# Patient Record
Sex: MALE | Race: BLACK OR AFRICAN AMERICAN | NOT HISPANIC OR LATINO | URBAN - METROPOLITAN AREA
[De-identification: names, ages, dates, MRNs, and addresses within clinical notes are randomized per-mention and may not be internally consistent; named-entity substitution may affect disease eponyms.]

---

## 2014-04-17 RX ORDER — LEVOTHYROXINE SODIUM 125 MCG
1 TABLET ORAL
Qty: 0 | Refills: 0 | COMMUNITY
Start: 2014-04-17

## 2014-04-17 RX ORDER — ATORVASTATIN CALCIUM 80 MG/1
1 TABLET, FILM COATED ORAL
Qty: 0 | Refills: 0 | COMMUNITY
Start: 2014-04-17

## 2014-04-17 RX ORDER — AMLODIPINE BESYLATE 2.5 MG/1
1 TABLET ORAL
Qty: 0 | Refills: 0 | COMMUNITY
Start: 2014-04-17

## 2017-04-14 ENCOUNTER — INPATIENT (INPATIENT)
Facility: HOSPITAL | Age: 82
LOS: 0 days | Discharge: HOME | End: 2017-04-15
Attending: INTERNAL MEDICINE | Admitting: INTERNAL MEDICINE
Payer: MEDICARE

## 2017-04-14 VITALS
OXYGEN SATURATION: 100 % | TEMPERATURE: 99 F | RESPIRATION RATE: 16 BRPM | SYSTOLIC BLOOD PRESSURE: 152 MMHG | HEART RATE: 84 BPM | DIASTOLIC BLOOD PRESSURE: 69 MMHG

## 2017-04-14 DIAGNOSIS — R07.9 CHEST PAIN, UNSPECIFIED: ICD-10-CM

## 2017-04-14 LAB
ALBUMIN SERPL ELPH-MCNC: 4 G/DL — SIGNIFICANT CHANGE UP (ref 3.3–5)
ALP SERPL-CCNC: 50 U/L — SIGNIFICANT CHANGE UP (ref 40–120)
ALT FLD-CCNC: 12 U/L — SIGNIFICANT CHANGE UP (ref 4–41)
APTT BLD: 29.2 SEC — SIGNIFICANT CHANGE UP (ref 27.5–37.4)
AST SERPL-CCNC: 19 U/L — SIGNIFICANT CHANGE UP (ref 4–40)
BASE EXCESS BLDV CALC-SCNC: 4.6 MMOL/L — SIGNIFICANT CHANGE UP
BASOPHILS # BLD AUTO: 0.01 K/UL — SIGNIFICANT CHANGE UP (ref 0–0.2)
BASOPHILS NFR BLD AUTO: 0.1 % — SIGNIFICANT CHANGE UP (ref 0–2)
BILIRUB SERPL-MCNC: 0.2 MG/DL — SIGNIFICANT CHANGE UP (ref 0.2–1.2)
BLOOD GAS VENOUS - CREATININE: 0.92 MG/DL — SIGNIFICANT CHANGE UP (ref 0.5–1.3)
BUN SERPL-MCNC: 21 MG/DL — SIGNIFICANT CHANGE UP (ref 7–23)
CALCIUM SERPL-MCNC: 8.5 MG/DL — SIGNIFICANT CHANGE UP (ref 8.4–10.5)
CHLORIDE BLDV-SCNC: 107 MMOL/L — SIGNIFICANT CHANGE UP (ref 96–108)
CHLORIDE SERPL-SCNC: 103 MMOL/L — SIGNIFICANT CHANGE UP (ref 98–107)
CK MB BLD-MCNC: 4.41 NG/ML — SIGNIFICANT CHANGE UP (ref 1–6.6)
CK SERPL-CCNC: 184 U/L — SIGNIFICANT CHANGE UP (ref 30–200)
CO2 SERPL-SCNC: 29 MMOL/L — SIGNIFICANT CHANGE UP (ref 22–31)
CREAT SERPL-MCNC: 0.91 MG/DL — SIGNIFICANT CHANGE UP (ref 0.5–1.3)
EOSINOPHIL # BLD AUTO: 0.05 K/UL — SIGNIFICANT CHANGE UP (ref 0–0.5)
EOSINOPHIL NFR BLD AUTO: 0.6 % — SIGNIFICANT CHANGE UP (ref 0–6)
GAS PNL BLDV: 141 MMOL/L — SIGNIFICANT CHANGE UP (ref 136–146)
GLUCOSE BLDV-MCNC: 120 — HIGH (ref 70–99)
GLUCOSE SERPL-MCNC: 119 MG/DL — HIGH (ref 70–99)
HCO3 BLDV-SCNC: 27 MMOL/L — SIGNIFICANT CHANGE UP (ref 20–27)
HCT VFR BLD CALC: 38.4 % — LOW (ref 39–50)
HCT VFR BLDV CALC: 37.3 % — LOW (ref 39–51)
HGB BLD-MCNC: 12 G/DL — LOW (ref 13–17)
HGB BLDV-MCNC: 12.1 G/DL — LOW (ref 13–17)
IMM GRANULOCYTES NFR BLD AUTO: 0.1 % — SIGNIFICANT CHANGE UP (ref 0–1.5)
INR BLD: 1.05 — SIGNIFICANT CHANGE UP (ref 0.88–1.17)
LACTATE BLDV-MCNC: 1.9 MMOL/L — SIGNIFICANT CHANGE UP (ref 0.5–2)
LYMPHOCYTES # BLD AUTO: 1.1 K/UL — SIGNIFICANT CHANGE UP (ref 1–3.3)
LYMPHOCYTES # BLD AUTO: 13.8 % — SIGNIFICANT CHANGE UP (ref 13–44)
MCHC RBC-ENTMCNC: 29.7 PG — SIGNIFICANT CHANGE UP (ref 27–34)
MCHC RBC-ENTMCNC: 31.3 % — LOW (ref 32–36)
MCV RBC AUTO: 95 FL — SIGNIFICANT CHANGE UP (ref 80–100)
MONOCYTES # BLD AUTO: 0.42 K/UL — SIGNIFICANT CHANGE UP (ref 0–0.9)
MONOCYTES NFR BLD AUTO: 5.3 % — SIGNIFICANT CHANGE UP (ref 2–14)
NEUTROPHILS # BLD AUTO: 6.39 K/UL — SIGNIFICANT CHANGE UP (ref 1.8–7.4)
NEUTROPHILS NFR BLD AUTO: 80.1 % — HIGH (ref 43–77)
PCO2 BLDV: 52 MMHG — HIGH (ref 41–51)
PH BLDV: 7.37 PH — SIGNIFICANT CHANGE UP (ref 7.32–7.43)
PLATELET # BLD AUTO: 174 K/UL — SIGNIFICANT CHANGE UP (ref 150–400)
PMV BLD: 10.4 FL — SIGNIFICANT CHANGE UP (ref 7–13)
PO2 BLDV: 38 MMHG — SIGNIFICANT CHANGE UP (ref 35–40)
POTASSIUM BLDV-SCNC: 3.9 MMOL/L — SIGNIFICANT CHANGE UP (ref 3.4–4.5)
POTASSIUM SERPL-MCNC: 4.1 MMOL/L — SIGNIFICANT CHANGE UP (ref 3.5–5.3)
POTASSIUM SERPL-SCNC: 4.1 MMOL/L — SIGNIFICANT CHANGE UP (ref 3.5–5.3)
PROT SERPL-MCNC: 6.7 G/DL — SIGNIFICANT CHANGE UP (ref 6–8.3)
PROTHROM AB SERPL-ACNC: 11.8 SEC — SIGNIFICANT CHANGE UP (ref 9.8–13.1)
RBC # BLD: 4.04 M/UL — LOW (ref 4.2–5.8)
RBC # FLD: 14.3 % — SIGNIFICANT CHANGE UP (ref 10.3–14.5)
SAO2 % BLDV: 64 % — SIGNIFICANT CHANGE UP (ref 60–85)
SODIUM SERPL-SCNC: 144 MMOL/L — SIGNIFICANT CHANGE UP (ref 135–145)
TROPONIN T SERPL-MCNC: < 0.06 NG/ML — SIGNIFICANT CHANGE UP (ref 0–0.06)
WBC # BLD: 7.98 K/UL — SIGNIFICANT CHANGE UP (ref 3.8–10.5)
WBC # FLD AUTO: 7.98 K/UL — SIGNIFICANT CHANGE UP (ref 3.8–10.5)

## 2017-04-14 PROCEDURE — 71020: CPT | Mod: 26

## 2017-04-14 NOTE — ED ADULT TRIAGE NOTE - CHIEF COMPLAINT QUOTE
Pt with hx of dementia brought in by wife with c/o pain radiating down LLE with numbness/tingling. Pt also c/o transient abd discomfort. Pt cannot recall any other adverse medical symptoms at this time. Abnormal EKG - advised to bring pt back soon.

## 2017-04-14 NOTE — ED PROVIDER NOTE - OBJECTIVE STATEMENT
82 yo M with history of alzheimers dementia and htn presenting with chest pain with radiation to left arm today at 7pm that lasted for 30 minutes that resolved upon arrival. Pt is poor historian. Denies fevers, chills, cough, rhinorrhea, otorrhea, otalgia, nausea, vomiting, constipation, diarrhea, chest pain, shortness of breath or changes in urinary habits.

## 2017-04-14 NOTE — ED PROVIDER NOTE - ATTENDING CONTRIBUTION TO CARE
I performed a face to face evaluation of this patient and performed a full history and physical examination on the patient.  I agree with the resident's history, physical examination, and plan of the patient.  84yo M PMH: HTN, prostate CA s/p prostectomy, thyroid CA s/p thyroidectomy brought to ED by family for chest pain radiating to LUE. Patient complained of pain earlier today, no prior similar complaints of pain, no recent URI, cough, fever, injury. Currently patient has no complaints  On exam awake & alert, NAD., lungs CTAB, RRR, abdomen soft NT/ND, no CVA tenderness, no edema, no calf tenderness, 2+ pulses b/l, skin warm and dry no rash

## 2017-04-14 NOTE — ED PROVIDER NOTE - MEDICAL DECISION MAKING DETAILS
84 yo M with chest pain - rule out ACS with LBBB changed from old in 2014 - will get enzymes and admit

## 2017-04-14 NOTE — ED ADULT NURSE NOTE - OBJECTIVE STATEMENT
A&Ox2, respirations even and unlabored, skin warm and dry good for color and intact, speaks coherently, moves extremities. Patient states he "feels" A&Ox2, respirations even and unlabored, skin warm and dry good for color and intact, speaks coherently, moves extremities. Patient states he "feels fine". As per family during dinner he held his chest and complained of chest pain. Patient denies n/v, LOC, recent falls.

## 2017-04-15 VITALS
TEMPERATURE: 98 F | SYSTOLIC BLOOD PRESSURE: 135 MMHG | OXYGEN SATURATION: 100 % | DIASTOLIC BLOOD PRESSURE: 48 MMHG | RESPIRATION RATE: 17 BRPM | HEART RATE: 64 BPM

## 2017-04-15 DIAGNOSIS — I24.9 ACUTE ISCHEMIC HEART DISEASE, UNSPECIFIED: ICD-10-CM

## 2017-04-15 DIAGNOSIS — Z41.8 ENCOUNTER FOR OTHER PROCEDURES FOR PURPOSES OTHER THAN REMEDYING HEALTH STATE: ICD-10-CM

## 2017-04-15 DIAGNOSIS — I10 ESSENTIAL (PRIMARY) HYPERTENSION: ICD-10-CM

## 2017-04-15 DIAGNOSIS — F03.90 UNSPECIFIED DEMENTIA, UNSPECIFIED SEVERITY, WITHOUT BEHAVIORAL DISTURBANCE, PSYCHOTIC DISTURBANCE, MOOD DISTURBANCE, AND ANXIETY: ICD-10-CM

## 2017-04-15 DIAGNOSIS — E78.5 HYPERLIPIDEMIA, UNSPECIFIED: ICD-10-CM

## 2017-04-15 LAB
BUN SERPL-MCNC: 23 MG/DL — SIGNIFICANT CHANGE UP (ref 7–23)
CALCIUM SERPL-MCNC: 8.3 MG/DL — LOW (ref 8.4–10.5)
CHLORIDE SERPL-SCNC: 105 MMOL/L — SIGNIFICANT CHANGE UP (ref 98–107)
CHOLEST SERPL-MCNC: 174 MG/DL — SIGNIFICANT CHANGE UP (ref 120–199)
CK MB BLD-MCNC: 3.22 NG/ML — SIGNIFICANT CHANGE UP (ref 1–6.6)
CK SERPL-CCNC: 145 U/L — SIGNIFICANT CHANGE UP (ref 30–200)
CO2 SERPL-SCNC: 29 MMOL/L — SIGNIFICANT CHANGE UP (ref 22–31)
CREAT SERPL-MCNC: 0.82 MG/DL — SIGNIFICANT CHANGE UP (ref 0.5–1.3)
GLUCOSE SERPL-MCNC: 107 MG/DL — HIGH (ref 70–99)
HBA1C BLD-MCNC: 5.6 % — SIGNIFICANT CHANGE UP (ref 4–5.6)
HCT VFR BLD CALC: 35.7 % — LOW (ref 39–50)
HDLC SERPL-MCNC: 61 MG/DL — HIGH (ref 35–55)
HGB BLD-MCNC: 11.4 G/DL — LOW (ref 13–17)
LIPID PNL WITH DIRECT LDL SERPL: 106 MG/DL — SIGNIFICANT CHANGE UP
MAGNESIUM SERPL-MCNC: 2.2 MG/DL — SIGNIFICANT CHANGE UP (ref 1.6–2.6)
MCHC RBC-ENTMCNC: 30.2 PG — SIGNIFICANT CHANGE UP (ref 27–34)
MCHC RBC-ENTMCNC: 31.9 % — LOW (ref 32–36)
MCV RBC AUTO: 94.7 FL — SIGNIFICANT CHANGE UP (ref 80–100)
PHOSPHATE SERPL-MCNC: 4.3 MG/DL — SIGNIFICANT CHANGE UP (ref 2.5–4.5)
PLATELET # BLD AUTO: 169 K/UL — SIGNIFICANT CHANGE UP (ref 150–400)
PMV BLD: 10.3 FL — SIGNIFICANT CHANGE UP (ref 7–13)
POTASSIUM SERPL-MCNC: 4 MMOL/L — SIGNIFICANT CHANGE UP (ref 3.5–5.3)
POTASSIUM SERPL-SCNC: 4 MMOL/L — SIGNIFICANT CHANGE UP (ref 3.5–5.3)
RBC # BLD: 3.77 M/UL — LOW (ref 4.2–5.8)
RBC # FLD: 14.4 % — SIGNIFICANT CHANGE UP (ref 10.3–14.5)
SODIUM SERPL-SCNC: 143 MMOL/L — SIGNIFICANT CHANGE UP (ref 135–145)
TRIGL SERPL-MCNC: 79 MG/DL — SIGNIFICANT CHANGE UP (ref 10–149)
TROPONIN T SERPL-MCNC: < 0.06 NG/ML — SIGNIFICANT CHANGE UP (ref 0–0.06)
TSH SERPL-MCNC: 0.53 UIU/ML — SIGNIFICANT CHANGE UP (ref 0.27–4.2)
WBC # BLD: 4.84 K/UL — SIGNIFICANT CHANGE UP (ref 3.8–10.5)
WBC # FLD AUTO: 4.84 K/UL — SIGNIFICANT CHANGE UP (ref 3.8–10.5)

## 2017-04-15 PROCEDURE — 93306 TTE W/DOPPLER COMPLETE: CPT | Mod: 26

## 2017-04-15 RX ORDER — MEMANTINE HYDROCHLORIDE 10 MG/1
10 TABLET ORAL
Qty: 0 | Refills: 0 | Status: DISCONTINUED | OUTPATIENT
Start: 2017-04-15 | End: 2017-04-15

## 2017-04-15 RX ORDER — CHOLECALCIFEROL (VITAMIN D3) 125 MCG
2000 CAPSULE ORAL DAILY
Qty: 0 | Refills: 0 | Status: DISCONTINUED | OUTPATIENT
Start: 2017-04-15 | End: 2017-04-15

## 2017-04-15 RX ORDER — ATORVASTATIN CALCIUM 80 MG/1
10 TABLET, FILM COATED ORAL AT BEDTIME
Qty: 0 | Refills: 0 | Status: DISCONTINUED | OUTPATIENT
Start: 2017-04-15 | End: 2017-04-15

## 2017-04-15 RX ORDER — LEVOTHYROXINE SODIUM 125 MCG
175 TABLET ORAL DAILY
Qty: 0 | Refills: 0 | Status: DISCONTINUED | OUTPATIENT
Start: 2017-04-15 | End: 2017-04-15

## 2017-04-15 RX ORDER — AMLODIPINE BESYLATE 2.5 MG/1
10 TABLET ORAL DAILY
Qty: 0 | Refills: 0 | Status: DISCONTINUED | OUTPATIENT
Start: 2017-04-15 | End: 2017-04-15

## 2017-04-15 RX ORDER — HEPARIN SODIUM 5000 [USP'U]/ML
5000 INJECTION INTRAVENOUS; SUBCUTANEOUS EVERY 8 HOURS
Qty: 0 | Refills: 0 | Status: DISCONTINUED | OUTPATIENT
Start: 2017-04-15 | End: 2017-04-15

## 2017-04-15 RX ORDER — ASPIRIN/CALCIUM CARB/MAGNESIUM 324 MG
81 TABLET ORAL DAILY
Qty: 0 | Refills: 0 | Status: DISCONTINUED | OUTPATIENT
Start: 2017-04-15 | End: 2017-04-15

## 2017-04-15 RX ADMIN — AMLODIPINE BESYLATE 10 MILLIGRAM(S): 2.5 TABLET ORAL at 06:21

## 2017-04-15 RX ADMIN — Medication 175 MICROGRAM(S): at 06:21

## 2017-04-15 RX ADMIN — HEPARIN SODIUM 5000 UNIT(S): 5000 INJECTION INTRAVENOUS; SUBCUTANEOUS at 06:21

## 2017-04-15 RX ADMIN — HEPARIN SODIUM 5000 UNIT(S): 5000 INJECTION INTRAVENOUS; SUBCUTANEOUS at 14:06

## 2017-04-15 RX ADMIN — Medication 81 MILLIGRAM(S): at 11:30

## 2017-04-15 RX ADMIN — MEMANTINE HYDROCHLORIDE 10 MILLIGRAM(S): 10 TABLET ORAL at 06:21

## 2017-04-15 RX ADMIN — Medication 2000 UNIT(S): at 11:30

## 2017-04-15 RX ADMIN — Medication 1 TABLET(S): at 11:30

## 2017-04-15 NOTE — DISCHARGE NOTE ADULT - PLAN OF CARE
Prevent future episodes. Ensure compliance with medications. Follow up with cardiologist within one week of discharge. Call for appointment. Return to ED for any concerning symptoms. Continue medications as prescribed. Low salt, low fat, low cholesterol diet. Maintain adequate control of your blood pressure. Goal BP < 130/80. Continue low sodium diet. Follow up with PCP and/or cardiologist for ongoing medical management of your hypertension. Continue medications as prescribed. Low salt diet. Maintain adequate control of your cholesterol levels. Goal LDL < 70. Follow up with PCP for ongoing medical management. Continue medications as prescribed. Low cholesterol diet. Continue current medications. Follow up with your PCP and/or neurologist.

## 2017-04-15 NOTE — DISCHARGE NOTE ADULT - PROVIDER TOKENS
TOKEN:'[id number]',FREE:[LAST:[Bernardino],FIRST:[Merari],PHONE:[phone]],FAX:[(   [phone]    -],ADDRESS:[[facility]]]

## 2017-04-15 NOTE — H&P ADULT. - RS GEN PE MLT RESP DETAILS PC
clear to auscultation bilaterally/good air movement/airway patent/respirations non-labored/no wheezes/no rales/no chest wall tenderness/no intercostal retractions/normal/airway obstructed/breath sounds equal/no rhonchi

## 2017-04-15 NOTE — H&P ADULT. - GASTROINTESTINAL DETAILS
no guarding/bowel sounds normal/normal/no rigidity/no distention/soft/nontender/no rebound tenderness

## 2017-04-15 NOTE — H&P ADULT. - ASSESSMENT
82 y/o M with h/o alzheimers dementia, HTN, HLD, prostate CA s/p prostatectomy, thyroid cA s/p thyroidectomy present to the ED for chest pain. Admit to telemetry to r/o ACS

## 2017-04-15 NOTE — H&P ADULT. - HISTORY OF PRESENT ILLNESS
84 y/o M with h/o alzheimer's dementia, HTN, HLD, prostate CA s/p prostatectomy, thyroid CA s/p thyroidectomy presents to the ED for chest pain. Pt has a history of alzheimer's disease and patient is poor historian. Pt's history was obtained from family. As per son, patient was eating dinner and put his hand on his chest and complained of chest pain. Pt also complained of right arm pain. Pt is currently asymptomatic and denies any chest pain. Pt denies LOC, syncope, fever, chills, recent infection, N/VD/C, shortness of breath, palpitations, numbness, tingling, dysuria, urinary/bowel incontinence or any other complaints at this time.

## 2017-04-15 NOTE — DISCHARGE NOTE ADULT - MEDICATION SUMMARY - MEDICATIONS TO TAKE
I will START or STAY ON the medications listed below when I get home from the hospital:    aspirin 81 mg oral tablet  -- 1 tab(s) by mouth once a day  -- Indication: For HLD (hyperlipidemia)    atorvastatin 10 mg oral tablet  -- 1 tab(s) by mouth once a day  -- Indication: For HLD (hyperlipidemia)    amLODIPine 10 mg oral tablet  -- 1 tab(s) by mouth once a day  -- Indication: For HTN (hypertension)    memantine 10 mg oral tablet  -- 1 tab(s) by mouth 2 times a day  -- Indication: For Dementia    levothyroxine 175 mcg (0.175 mg) oral tablet  -- 1 tab(s) by mouth once a day  -- Indication: For Hypothyroidism    calcium (as carbonate)-vitamin D 250 mg-125 intl units oral tablet  -- 1 tab(s) by mouth once a day  -- Indication: For Need for prophylactic measure    Vitamin D3 2000 intl units oral capsule  -- 1 cap(s) by mouth once a day  -- Indication: For Need for prophylactic measure

## 2017-04-15 NOTE — DISCHARGE NOTE ADULT - PATIENT PORTAL LINK FT
“You can access the [facility] Patient Portal, offered by [facility], by registering with the following website: [email or web address]”

## 2017-04-15 NOTE — DISCHARGE NOTE ADULT - NS AS ACTIVITY OBS
Showering allowed/Walking-Indoors allowed/No Heavy lifting/straining/Walking-Outdoors allowed/Do not make important decisions/Do not drive or operate machinery/Bathing allowed

## 2017-04-15 NOTE — DISCHARGE NOTE ADULT - HOSPITAL COURSE
84 y/o male with a PMHx of Alzheimer's dementia, HTN, HLD, prostate cancer S/P prostatectomy, thyroid cancer S/P thyroidectomy presents to ED with chest pain. Pt was admitted to telemetry. EKG revealed NSR at 71 bpm with LBBB. Pt ruled out for ACS with two sets of negative cardiac enzymes. CXR revealed clear lungs. Pt was seen by Dr. Koch from cardiology. Pt had no events on telemetry during admission. Chest pain was deemed atypical. Pt now stable for discharge home with outpatient cardiology follow up.

## 2017-04-15 NOTE — H&P ADULT. - PROBLEM SELECTOR PLAN 1
Admit to telemetry.   Trend CE. EKG, CXR.  consider ischemic evaluation. continue to monitor.   Check tsh, lipid, hemoglobin a1c, cbc, bmp with mag and phos.   f/u MD note

## 2017-04-15 NOTE — DISCHARGE NOTE ADULT - CARE PROVIDER_API CALL
Cosme Koch (MAYELIN), Cardiology  [address] [address] [place] [id number]  Phone: [phone]  Fax: [phone]    Merari Lebron  [facility]  Phone: [phone]  Fax: (   )    -

## 2017-04-15 NOTE — DISCHARGE NOTE ADULT - ADDITIONAL INSTRUCTIONS
Follow up with cardiologist within one week of discharge. Call for appointment. Return to ED for any concerning symptoms. Continue medications as prescribed. Low salt, low fat, low cholesterol diet.

## 2017-04-15 NOTE — DISCHARGE NOTE ADULT - CARE PLAN
Principal Discharge DX:	Atypical chest pain  Goal:	Prevent future episodes. Ensure compliance with medications.  Instructions for follow-up, activity and diet:	Follow up with cardiologist within one week of discharge. Call for appointment. Return to ED for any concerning symptoms. Continue medications as prescribed. Low salt, low fat, low cholesterol diet.  Secondary Diagnosis:	HTN (hypertension)  Goal:	Maintain adequate control of your blood pressure. Goal BP < 130/80. Continue low sodium diet.  Instructions for follow-up, activity and diet:	Follow up with PCP and/or cardiologist for ongoing medical management of your hypertension. Continue medications as prescribed. Low salt diet.  Secondary Diagnosis:	HLD (hyperlipidemia)  Goal:	Maintain adequate control of your cholesterol levels. Goal LDL < 70.  Instructions for follow-up, activity and diet:	Follow up with PCP for ongoing medical management. Continue medications as prescribed. Low cholesterol diet.  Secondary Diagnosis:	Dementia  Goal:	Continue current medications.  Instructions for follow-up, activity and diet:	Follow up with your PCP and/or neurologist.

## 2018-11-22 ENCOUNTER — EMERGENCY (EMERGENCY)
Facility: HOSPITAL | Age: 83
LOS: 1 days | Discharge: HOME | End: 2018-11-22
Attending: EMERGENCY MEDICINE
Payer: MEDICARE

## 2018-11-22 VITALS
WEIGHT: 136.91 LBS | HEART RATE: 65 BPM | TEMPERATURE: 98 F | DIASTOLIC BLOOD PRESSURE: 79 MMHG | SYSTOLIC BLOOD PRESSURE: 191 MMHG | RESPIRATION RATE: 17 BRPM | OXYGEN SATURATION: 98 % | HEIGHT: 72 IN

## 2018-11-22 VITALS
HEART RATE: 84 BPM | RESPIRATION RATE: 16 BRPM | TEMPERATURE: 98 F | DIASTOLIC BLOOD PRESSURE: 80 MMHG | SYSTOLIC BLOOD PRESSURE: 176 MMHG | OXYGEN SATURATION: 100 %

## 2018-11-22 LAB
ALBUMIN SERPL ELPH-MCNC: 4.5 G/DL — SIGNIFICANT CHANGE UP (ref 3.3–5)
ALP SERPL-CCNC: 48 U/L — SIGNIFICANT CHANGE UP (ref 40–120)
ALT FLD-CCNC: 28 U/L — SIGNIFICANT CHANGE UP (ref 10–45)
ANION GAP SERPL CALC-SCNC: 11 MMOL/L — SIGNIFICANT CHANGE UP (ref 5–17)
AST SERPL-CCNC: 43 U/L — HIGH (ref 10–40)
BASOPHILS # BLD AUTO: 0 K/UL — SIGNIFICANT CHANGE UP (ref 0–0.2)
BASOPHILS NFR BLD AUTO: 0.9 % — SIGNIFICANT CHANGE UP (ref 0–2)
BILIRUB SERPL-MCNC: 0.3 MG/DL — SIGNIFICANT CHANGE UP (ref 0.2–1.2)
BUN SERPL-MCNC: 13 MG/DL — SIGNIFICANT CHANGE UP (ref 7–23)
CALCIUM SERPL-MCNC: 8.3 MG/DL — LOW (ref 8.4–10.5)
CHLORIDE SERPL-SCNC: 99 MMOL/L — SIGNIFICANT CHANGE UP (ref 96–108)
CO2 SERPL-SCNC: 27 MMOL/L — SIGNIFICANT CHANGE UP (ref 22–31)
CREAT SERPL-MCNC: 0.75 MG/DL — SIGNIFICANT CHANGE UP (ref 0.5–1.3)
EOSINOPHIL # BLD AUTO: 0.1 K/UL — SIGNIFICANT CHANGE UP (ref 0–0.5)
EOSINOPHIL NFR BLD AUTO: 1.8 % — SIGNIFICANT CHANGE UP (ref 0–6)
GLUCOSE SERPL-MCNC: 82 MG/DL — SIGNIFICANT CHANGE UP (ref 70–99)
HCT VFR BLD CALC: 38.5 % — LOW (ref 39–50)
HGB BLD-MCNC: 12.5 G/DL — LOW (ref 13–17)
LYMPHOCYTES # BLD AUTO: 1 K/UL — SIGNIFICANT CHANGE UP (ref 1–3.3)
LYMPHOCYTES # BLD AUTO: 19.4 % — SIGNIFICANT CHANGE UP (ref 13–44)
MCHC RBC-ENTMCNC: 29.9 PG — SIGNIFICANT CHANGE UP (ref 27–34)
MCHC RBC-ENTMCNC: 32.4 GM/DL — SIGNIFICANT CHANGE UP (ref 32–36)
MCV RBC AUTO: 92.4 FL — SIGNIFICANT CHANGE UP (ref 80–100)
MONOCYTES # BLD AUTO: 0.4 K/UL — SIGNIFICANT CHANGE UP (ref 0–0.9)
MONOCYTES NFR BLD AUTO: 8 % — SIGNIFICANT CHANGE UP (ref 2–14)
NEUTROPHILS # BLD AUTO: 3.6 K/UL — SIGNIFICANT CHANGE UP (ref 1.8–7.4)
NEUTROPHILS NFR BLD AUTO: 69.9 % — SIGNIFICANT CHANGE UP (ref 43–77)
PLATELET # BLD AUTO: 209 K/UL — SIGNIFICANT CHANGE UP (ref 150–400)
POTASSIUM SERPL-MCNC: 4.3 MMOL/L — SIGNIFICANT CHANGE UP (ref 3.5–5.3)
POTASSIUM SERPL-SCNC: 4.3 MMOL/L — SIGNIFICANT CHANGE UP (ref 3.5–5.3)
PROT SERPL-MCNC: 7.1 G/DL — SIGNIFICANT CHANGE UP (ref 6–8.3)
RBC # BLD: 4.17 M/UL — LOW (ref 4.2–5.8)
RBC # FLD: 13.7 % — SIGNIFICANT CHANGE UP (ref 10.3–14.5)
SODIUM SERPL-SCNC: 137 MMOL/L — SIGNIFICANT CHANGE UP (ref 135–145)
WBC # BLD: 5.1 K/UL — SIGNIFICANT CHANGE UP (ref 3.8–10.5)
WBC # FLD AUTO: 5.1 K/UL — SIGNIFICANT CHANGE UP (ref 3.8–10.5)

## 2018-11-22 PROCEDURE — 99284 EMERGENCY DEPT VISIT MOD MDM: CPT

## 2018-11-22 RX ORDER — SODIUM CHLORIDE 9 MG/ML
500 INJECTION INTRAMUSCULAR; INTRAVENOUS; SUBCUTANEOUS ONCE
Qty: 0 | Refills: 0 | Status: COMPLETED | OUTPATIENT
Start: 2018-11-22 | End: 2018-11-22

## 2018-11-22 RX ADMIN — SODIUM CHLORIDE 500 MILLILITER(S): 9 INJECTION INTRAMUSCULAR; INTRAVENOUS; SUBCUTANEOUS at 17:25

## 2018-11-22 NOTE — ED ADULT NURSE NOTE - NSIMPLEMENTINTERV_GEN_ALL_ED
Implemented All Fall Risk Interventions:  [place] to call system. Call bell, personal items and telephone within reach. Instruct patient to call for assistance. Room bathroom lighting operational. Non-slip footwear when patient is off stretcher. Physically safe environment: no spills, clutter or unnecessary equipment. Stretcher in lowest position, wheels locked, appropriate side rails in place. Provide visual cue, wrist band, yellow gown, etc. Monitor gait and stability. Monitor for mental status changes and reorient to person, place, and time. Review medications for side effects contributing to fall risk. Reinforce activity limits and safety measures with patient and family.

## 2018-11-22 NOTE — ED PROVIDER NOTE - PLAN OF CARE
1. follow up with pmd in 24-48 hours  2. keep hydrated, BRAT diet (bananas, rice, apple sauce, toast)   3. if you have any worsening or concerning symptoms, fevers, chills, multiple loose bowel movements, bloody bowel movements, nausea or vomiting, return to the Emergency Room.   3. 1. follow up with pmd in 24-48 hours  2. keep hydrated, BRAT diet (bananas, rice, apple sauce, toast)   3. if you have any worsening or concerning symptoms, fevers, chills, multiple loose bowel movements, bloody bowel movements, nausea or vomiting, return to the Emergency Room.

## 2018-11-22 NOTE — ED PROVIDER NOTE - CARE PLAN
Principal Discharge DX:	Loose bowel movement  Assessment and plan of treatment:	1. follow up with pmd in 24-48 hours  2. keep hydrated, BRAT diet (bananas, rice, apple sauce, toast)   3. if you have any worsening or concerning symptoms, fevers, chills, multiple loose bowel movements, bloody bowel movements, nausea or vomiting, return to the Emergency Room.   3. Principal Discharge DX:	Loose bowel movement  Assessment and plan of treatment:	1. follow up with pmd in 24-48 hours  2. keep hydrated, BRAT diet (bananas, rice, apple sauce, toast)   3. if you have any worsening or concerning symptoms, fevers, chills, multiple loose bowel movements, bloody bowel movements, nausea or vomiting, return to the Emergency Room.

## 2018-11-22 NOTE — ED PROVIDER NOTE - ATTENDING CONTRIBUTION TO CARE
Patient had large, loose bowel movement earlier this morning x1 (approx 1AM).  Non bloody.  Since that point no BM.  Denying abdominal pains, no vomiting.  Normal appetite.  No fevers.  No BM since that point (now late afternoon).  On exam patient well appearing, hypertensive otherwise vital signs within normal limits, RRR S1/S2, lungs clear to ascultation bilaterally, abdomen soft, non tender, non distended.  Pt with large BM x1 but no evidence of significant diarrhea, given age plan for screening labs and likely outpatient referral.

## 2018-11-22 NOTE — ED ADULT NURSE NOTE - OBJECTIVE STATEMENT
Pt presents to ED awake, alert and ambulatory, c/o diarrhea. Patient's wife states pt has dementia and during the night he goes into the kitchen and eats raw food. Last night pt ate raw brussel sprouts and wife reports afterwards pt had one episode of a large amount of watery diarrhea. Pt has not had more diarrhea since. No blood or dark stools reported. No vomiting. No fever. No recent antibiotic use. Pt presents to ED awake, alert and ambulatory, c/o diarrhea. Patient's wife states pt has dementia and during the night he goes into the kitchen and eats raw food. Last night pt ate raw brussel sprouts and wife reports afterwards pt had one episode of a large amount of watery diarrhea. Pt has not had more diarrhea since. No blood or dark stools reported. No vomiting. No fever. No recent antibiotic use. Pt denies abdominal pain or nausea. Respirations even and unlabored.

## 2018-11-22 NOTE — ED PROVIDER NOTE - CONSTITUTIONAL, MLM
normal... Well appearing, well nourished, awake, alert, un-oriented to time/place and situation and in no apparent distress.

## 2018-11-22 NOTE — ED ADULT TRIAGE NOTE - CHIEF COMPLAINT QUOTE
diarrhea which started last night - denies blood. Per patient no N/V, no recent travel, no antibiotic use

## 2018-11-22 NOTE — ED PROVIDER NOTE - OBJECTIVE STATEMENT
85 yo male with a pmh of dementia, htn, hypothyroidism, hld seen for one non bloody loose bowel movement this morning at 1am. Pts wife gives hx as pt is poor historian d/t dementia. Wife notes pt has a hx of having loose bowel movements from time to time after eating raw foods and will commonly snack at night without his wife knowing, however she was worried as this BM was larger than usual. He has since not had any other BMs, has been eating and drinking well, wife has been giving him rice and vitamin water but wanted to make sure he did not have another large loose BM this evening and wished to have him evaluated. No sick contacts, recent travel, no fevers, chills, nausea, vomiting, cough, congestion, abdominal pain, dysuria, change in nature of urine. 83 yo male with a pmh of dementia, htn, hypothyroidism, hld seen for one non bloody loose bowel movement this morning at 1am. Pts wife gives hx as pt is poor historian d/t dementia. Wife notes pt has a hx of having loose bowel movements from time to time after eating raw foods and will commonly snack at night without his wife knowing, however she was worried as this BM was larger than usual. He has since not had any other BMs, has been eating and drinking well, wife has been giving him rice and vitamin water but wanted to make sure he did not have another large loose BM this evening and wished to have him evaluated. No sick contacts, recent travel, no fevers, chills, nausea, vomiting, cough, congestion, abdominal pain, dysuria, change in nature of urine, no change in pts mental status or behavior.

## 2018-11-24 ENCOUNTER — INPATIENT (INPATIENT)
Facility: HOSPITAL | Age: 83
LOS: 2 days | Discharge: HOME | DRG: 682 | End: 2018-11-27
Attending: INTERNAL MEDICINE | Admitting: INTERNAL MEDICINE
Payer: MEDICARE

## 2018-11-24 VITALS
SYSTOLIC BLOOD PRESSURE: 117 MMHG | TEMPERATURE: 98 F | OXYGEN SATURATION: 96 % | DIASTOLIC BLOOD PRESSURE: 74 MMHG | HEART RATE: 82 BPM | RESPIRATION RATE: 20 BRPM

## 2018-11-24 DIAGNOSIS — R55 SYNCOPE AND COLLAPSE: ICD-10-CM

## 2018-11-24 DIAGNOSIS — N17.9 ACUTE KIDNEY FAILURE, UNSPECIFIED: ICD-10-CM

## 2018-11-24 DIAGNOSIS — I10 ESSENTIAL (PRIMARY) HYPERTENSION: ICD-10-CM

## 2018-11-24 DIAGNOSIS — G93.41 METABOLIC ENCEPHALOPATHY: ICD-10-CM

## 2018-11-24 DIAGNOSIS — G30.9 ALZHEIMER'S DISEASE, UNSPECIFIED: ICD-10-CM

## 2018-11-24 DIAGNOSIS — R74.8 ABNORMAL LEVELS OF OTHER SERUM ENZYMES: ICD-10-CM

## 2018-11-24 LAB
ALBUMIN SERPL ELPH-MCNC: 4.5 G/DL — SIGNIFICANT CHANGE UP (ref 3.3–5)
ALP SERPL-CCNC: 53 U/L — SIGNIFICANT CHANGE UP (ref 40–120)
ALT FLD-CCNC: 23 U/L — SIGNIFICANT CHANGE UP (ref 10–45)
ANION GAP SERPL CALC-SCNC: 18 MMOL/L — HIGH (ref 5–17)
AST SERPL-CCNC: 38 U/L — SIGNIFICANT CHANGE UP (ref 10–40)
BASE EXCESS BLDV CALC-SCNC: 1.9 MMOL/L — SIGNIFICANT CHANGE UP (ref -2–2)
BILIRUB SERPL-MCNC: 0.4 MG/DL — SIGNIFICANT CHANGE UP (ref 0.2–1.2)
BUN SERPL-MCNC: 18 MG/DL — SIGNIFICANT CHANGE UP (ref 7–23)
CA-I SERPL-SCNC: 1 MMOL/L — LOW (ref 1.12–1.3)
CALCIUM SERPL-MCNC: 9 MG/DL — SIGNIFICANT CHANGE UP (ref 8.4–10.5)
CHLORIDE BLDV-SCNC: 100 MMOL/L — SIGNIFICANT CHANGE UP (ref 96–108)
CHLORIDE SERPL-SCNC: 93 MMOL/L — LOW (ref 96–108)
CK SERPL-CCNC: 626 U/L — HIGH (ref 30–200)
CO2 BLDV-SCNC: 30 MMOL/L — SIGNIFICANT CHANGE UP (ref 22–30)
CO2 SERPL-SCNC: 23 MMOL/L — SIGNIFICANT CHANGE UP (ref 22–31)
CREAT SERPL-MCNC: 1.34 MG/DL — HIGH (ref 0.5–1.3)
GAS PNL BLDV: 132 MMOL/L — LOW (ref 136–145)
GAS PNL BLDV: SIGNIFICANT CHANGE UP
GLUCOSE BLDV-MCNC: 135 MG/DL — HIGH (ref 70–99)
GLUCOSE SERPL-MCNC: 161 MG/DL — HIGH (ref 70–99)
HCO3 BLDV-SCNC: 28 MMOL/L — SIGNIFICANT CHANGE UP (ref 21–29)
HCT VFR BLD CALC: 43.4 % — SIGNIFICANT CHANGE UP (ref 39–50)
HCT VFR BLDA CALC: 40 % — SIGNIFICANT CHANGE UP (ref 39–50)
HGB BLD CALC-MCNC: 13 G/DL — SIGNIFICANT CHANGE UP (ref 13–17)
HGB BLD-MCNC: 14.3 G/DL — SIGNIFICANT CHANGE UP (ref 13–17)
LACTATE BLDV-MCNC: 2.7 MMOL/L — HIGH (ref 0.7–2)
LIDOCAIN IGE QN: 22 U/L — SIGNIFICANT CHANGE UP (ref 7–60)
MCHC RBC-ENTMCNC: 30.2 PG — SIGNIFICANT CHANGE UP (ref 27–34)
MCHC RBC-ENTMCNC: 33 GM/DL — SIGNIFICANT CHANGE UP (ref 32–36)
MCV RBC AUTO: 91.5 FL — SIGNIFICANT CHANGE UP (ref 80–100)
OTHER CELLS CSF MANUAL: 7 ML/DL — LOW (ref 18–22)
PCO2 BLDV: 52 MMHG — HIGH (ref 35–50)
PH BLDV: 7.35 — SIGNIFICANT CHANGE UP (ref 7.35–7.45)
PLATELET # BLD AUTO: 244 K/UL — SIGNIFICANT CHANGE UP (ref 150–400)
PO2 BLDV: <50 MMHG — SIGNIFICANT CHANGE UP (ref 25–45)
POTASSIUM BLDV-SCNC: 3.4 MMOL/L — LOW (ref 3.5–5.3)
POTASSIUM SERPL-MCNC: 3.4 MMOL/L — LOW (ref 3.5–5.3)
POTASSIUM SERPL-SCNC: 3.4 MMOL/L — LOW (ref 3.5–5.3)
PROT SERPL-MCNC: 7.3 G/DL — SIGNIFICANT CHANGE UP (ref 6–8.3)
RBC # BLD: 4.74 M/UL — SIGNIFICANT CHANGE UP (ref 4.2–5.8)
RBC # FLD: 13.8 % — SIGNIFICANT CHANGE UP (ref 10.3–14.5)
SAO2 % BLDV: 39 % — LOW (ref 67–88)
SODIUM SERPL-SCNC: 134 MMOL/L — LOW (ref 135–145)
TROPONIN T, HIGH SENSITIVITY RESULT: 49 NG/L — SIGNIFICANT CHANGE UP (ref 0–51)
WBC # BLD: 8.2 K/UL — SIGNIFICANT CHANGE UP (ref 3.8–10.5)
WBC # FLD AUTO: 8.2 K/UL — SIGNIFICANT CHANGE UP (ref 3.8–10.5)

## 2018-11-24 PROCEDURE — 71046 X-RAY EXAM CHEST 2 VIEWS: CPT | Mod: 26

## 2018-11-24 PROCEDURE — 70450 CT HEAD/BRAIN W/O DYE: CPT | Mod: 26

## 2018-11-24 PROCEDURE — 99223 1ST HOSP IP/OBS HIGH 75: CPT

## 2018-11-24 PROCEDURE — 99285 EMERGENCY DEPT VISIT HI MDM: CPT | Mod: 25

## 2018-11-24 PROCEDURE — 93010 ELECTROCARDIOGRAM REPORT: CPT

## 2018-11-24 RX ORDER — ASPIRIN/CALCIUM CARB/MAGNESIUM 324 MG
81 TABLET ORAL DAILY
Qty: 0 | Refills: 0 | Status: DISCONTINUED | OUTPATIENT
Start: 2018-11-24 | End: 2018-11-27

## 2018-11-24 RX ORDER — POTASSIUM CHLORIDE 20 MEQ
10 PACKET (EA) ORAL
Qty: 0 | Refills: 0 | Status: COMPLETED | OUTPATIENT
Start: 2018-11-24 | End: 2018-11-25

## 2018-11-24 RX ORDER — SODIUM CHLORIDE 9 MG/ML
1000 INJECTION INTRAMUSCULAR; INTRAVENOUS; SUBCUTANEOUS ONCE
Qty: 0 | Refills: 0 | Status: COMPLETED | OUTPATIENT
Start: 2018-11-24 | End: 2018-11-24

## 2018-11-24 RX ORDER — HEPARIN SODIUM 5000 [USP'U]/ML
5000 INJECTION INTRAVENOUS; SUBCUTANEOUS EVERY 8 HOURS
Qty: 0 | Refills: 0 | Status: DISCONTINUED | OUTPATIENT
Start: 2018-11-24 | End: 2018-11-27

## 2018-11-24 RX ORDER — ASPIRIN/CALCIUM CARB/MAGNESIUM 324 MG
324 TABLET ORAL DAILY
Qty: 0 | Refills: 0 | Status: DISCONTINUED | OUTPATIENT
Start: 2018-11-24 | End: 2018-11-24

## 2018-11-24 RX ORDER — SODIUM CHLORIDE 9 MG/ML
1000 INJECTION, SOLUTION INTRAVENOUS
Qty: 0 | Refills: 0 | Status: DISCONTINUED | OUTPATIENT
Start: 2018-11-24 | End: 2018-11-27

## 2018-11-24 RX ORDER — ACETAMINOPHEN 500 MG
975 TABLET ORAL ONCE
Qty: 0 | Refills: 0 | Status: COMPLETED | OUTPATIENT
Start: 2018-11-24 | End: 2018-11-24

## 2018-11-24 RX ORDER — LEVOTHYROXINE SODIUM 125 MCG
175 TABLET ORAL DAILY
Qty: 0 | Refills: 0 | Status: DISCONTINUED | OUTPATIENT
Start: 2018-11-24 | End: 2018-11-27

## 2018-11-24 RX ORDER — ATORVASTATIN CALCIUM 80 MG/1
10 TABLET, FILM COATED ORAL AT BEDTIME
Qty: 0 | Refills: 0 | Status: DISCONTINUED | OUTPATIENT
Start: 2018-11-24 | End: 2018-11-27

## 2018-11-24 RX ORDER — INFLUENZA VIRUS VACCINE 15; 15; 15; 15 UG/.5ML; UG/.5ML; UG/.5ML; UG/.5ML
0.5 SUSPENSION INTRAMUSCULAR ONCE
Qty: 0 | Refills: 0 | Status: DISCONTINUED | OUTPATIENT
Start: 2018-11-24 | End: 2018-11-27

## 2018-11-24 RX ORDER — MEMANTINE HYDROCHLORIDE 10 MG/1
10 TABLET ORAL
Qty: 0 | Refills: 0 | Status: DISCONTINUED | OUTPATIENT
Start: 2018-11-24 | End: 2018-11-27

## 2018-11-24 RX ADMIN — Medication 975 MILLIGRAM(S): at 21:19

## 2018-11-24 RX ADMIN — Medication 324 MILLIGRAM(S): at 21:19

## 2018-11-24 RX ADMIN — MEMANTINE HYDROCHLORIDE 10 MILLIGRAM(S): 10 TABLET ORAL at 23:56

## 2018-11-24 RX ADMIN — SODIUM CHLORIDE 2000 MILLILITER(S): 9 INJECTION INTRAMUSCULAR; INTRAVENOUS; SUBCUTANEOUS at 18:53

## 2018-11-24 RX ADMIN — SODIUM CHLORIDE 75 MILLILITER(S): 9 INJECTION, SOLUTION INTRAVENOUS at 23:57

## 2018-11-24 RX ADMIN — HEPARIN SODIUM 5000 UNIT(S): 5000 INJECTION INTRAVENOUS; SUBCUTANEOUS at 23:56

## 2018-11-24 RX ADMIN — Medication 100 MILLIEQUIVALENT(S): at 23:56

## 2018-11-24 RX ADMIN — ATORVASTATIN CALCIUM 10 MILLIGRAM(S): 80 TABLET, FILM COATED ORAL at 23:56

## 2018-11-24 RX ADMIN — SODIUM CHLORIDE 1000 MILLILITER(S): 9 INJECTION INTRAMUSCULAR; INTRAVENOUS; SUBCUTANEOUS at 21:19

## 2018-11-24 RX ADMIN — Medication 975 MILLIGRAM(S): at 18:53

## 2018-11-24 RX ADMIN — SODIUM CHLORIDE 2000 MILLILITER(S): 9 INJECTION INTRAMUSCULAR; INTRAVENOUS; SUBCUTANEOUS at 20:01

## 2018-11-24 NOTE — H&P ADULT - NSHPLABSRESULTS_GEN_ALL_CORE
Personally reviewed labs.   Personally reviewed imaging.   Personally reviewed EKG. NSR, rate 70, . Q wave in V1-V3. <1mm STD in II/III/V4-V6. <1mm CLARIBEL in V1-V3. Grossly unchanged from EKG on Apr '17.                          14.3   8.2   )-----------( 244      ( 24 Nov 2018 18:21 )             43.4       11-24    134<L>  |  93<L>  |  18  ----------------------------<  161<H>  3.4<L>   |  23  |  1.34<H>    Ca    9.0      24 Nov 2018 18:21    TPro  7.3  /  Alb  4.5  /  TBili  0.4  /  DBili  x   /  AST  38  /  ALT  23  /  AlkPhos  53  11-24      CARDIAC MARKERS ( 24 Nov 2018 18:21 )  x     / x     / 626 U/L / x     / x            LIVER FUNCTIONS - ( 24 Nov 2018 18:21 )  Alb: 4.5 g/dL / Pro: 7.3 g/dL / ALK PHOS: 53 U/L / ALT: 23 U/L / AST: 38 U/L / GGT: x

## 2018-11-24 NOTE — ED PROVIDER NOTE - QUALITY
**ATTENDING ADDENDUM (Dr. Jimmy Grossman): NO movement-associated, pleuritic, reproducible, positional, or exertional component to the symptoms.

## 2018-11-24 NOTE — H&P ADULT - NSHPPHYSICALEXAM_GEN_ALL_CORE
PHYSICAL EXAM:   GENERAL: Alert. +confused. No acute distress. +thin.   HEAD:  Atraumatic. Normocephalic.  EYES: EOMI. PERRLA. Normal conjunctiva/sclera.  ENT: Neck supple. No JVD. Moist oral mucosa. Not edentulous. No thrush.  LYMPH: Normal supraclavicular/cervical lymph nodes.   CARDIAC: Not tachy, Not gunjan. Regular rhythm. Not irregularly irregular. S1. S2. No murmur. No rub. No distant heart sounds.  LUNG/CHEST: CTAB. BS equal bilaterally. No wheezes. No rales. No rhonchi.  ABDOMEN: Soft. No tenderness. No distension. No fluid wave. Normal bowel sounds.  BACK: No midline/vertebral tenderness. No flank tenderness.  VASCULAR: +2 b/l radial or ulnar pulses. Palpable DP pulses.  EXTREMITIES:  No clubbing. No cyanosis. No edema. Moving all 4.  NEUROLOGY: A&Ox1. Non-focal exam. Cranial nerves intact. Does not talk much, gives short answers only. Sensation intact.  PSYCH: Normal behavior. Flat affect.  SKIN: No jaundice. No erythema. No rash/lesion.  Vascular Access:     ICU Vital Signs Last 24 Hrs  T(C): 36.4 (24 Nov 2018 22:22), Max: 37.2 (24 Nov 2018 18:30)  T(F): 97.6 (24 Nov 2018 22:22), Max: 98.9 (24 Nov 2018 18:30)  HR: 66 (24 Nov 2018 22:22) (63 - 82)  BP: 134/68 (24 Nov 2018 22:22) (112/70 - 144/72)  BP(mean): --  ABP: --  ABP(mean): --  RR: 16 (24 Nov 2018 22:22) (16 - 20)  SpO2: 100% (24 Nov 2018 22:22) (96% - 100%)      I&O's Summary

## 2018-11-24 NOTE — H&P ADULT - ASSESSMENT
84M c hx advanced alzheimer's dementia (A&Ox1-2 baseline), HTN, HLD, prostate ca s/p prostatectomy, thyroid ca s/p thyroidectomy pw acute metabolic encephalopathy, RUSS.

## 2018-11-24 NOTE — H&P ADULT - HISTORY OF PRESENT ILLNESS
84M c hx advanced alzheimer's dementia (A&Ox1-2 baseline), HTN, HLD, prostate ca s/p prostatectomy, thyroid ca s/p thyroidectomy presents with confusion, unresponsiveness.    History obtained from wife (Magali Wong) and daughter (Larisa Wong [phone]) at bedside. Pt has slowly progressive dementia over at least the past 10 years. Pt has had episodes of increased confusion at times. No falls this year, requires no assistive device, and no hx of heart disease. Today, while at dinner with his family, became unresponsive for about 1-2 minutes, witnessed. Pt appeared to be leaning over his plate, and not responding to questions. Then when he came to, he was having a little slurring of his speech, which slowly improved over the following minutes, associated with a frontal headache. Pt was here in the ED 2 days ago for an episode of diarrhea and fecal incontinence. He wears diapers and intermittently will not be continent of stool or urine. Pt currently denies any symptoms. Denies CP, SOB, fevers, chills, URI symptoms. Pt has good appetite, and eats a lot of frozen foods.    VS: Tm 98.8, P 80, /70, R 20, 100% RA  In the ED, received , NS 2L, tylenol	      presents to the ED for chest pain. Pt has a history of alzheimer's disease and patient is poor historian. Pt's history was obtained from family. As per son, patient was eating dinner and put his hand on his chest and complained of chest pain. Pt also complained of right arm pain. Pt is currently asymptomatic and denies any chest pain. Pt denies LOC, syncope, fever, chills, recent infection, N/VD/C, shortness of breath, palpitations, numbness, tingling, dysuria, urinary/bowel incontinence or any other complaints at this time. 84M c hx advanced alzheimer's dementia (A&Ox1-2 baseline), HTN, HLD, prostate ca s/p prostatectomy, thyroid ca s/p thyroidectomy presents with confusion, unresponsiveness.    History obtained from wife (Magali Wong) and daughter (Larisa Wong [phone]) at bedside. Pt is a poor historian, but has pleasant affect. Pt has slowly progressive dementia over at least the past 10 years. Pt has had episodes of increased confusion at times. No falls this year, requires no assistive device, and no hx of heart disease. Today, while at dinner with his family, became unresponsive for about 1-2 minutes, witnessed. Pt appeared to be leaning over his plate, and not responding to questions. Then when he came to, he was having a little slurring of his speech, which slowly improved over the following minutes, followed by 1 episode vomiting and associated with a frontal headache. Pt was here in the ED 2 days ago for an episode of diarrhea and fecal incontinence. He wears diapers and intermittently will not be continent of stool or urine. Pt currently denies any symptoms. Denies CP, SOB, fevers, chills, URI symptoms. Pt has good appetite, and eats a lot of frozen foods.    VS: Tm 98.8, P 80, /70, R 20, 100% RA  In the ED, received , NS 2L, tylenol

## 2018-11-24 NOTE — ED PROVIDER NOTE - CHPI ED SYMPTOMS NEG
**ATTENDING ADDENDUM (Dr. Jimmy Grossman): NO reported localized weakness. no nausea/no vomiting/**ATTENDING ADDENDUM (Dr. Jimmy Grossman): NO reported localized weakness.

## 2018-11-24 NOTE — ED PROVIDER NOTE - CHPI ED SYMPTOMS POS
DIZZINESS/WEAKNESS/HEADACHE/**ATTENDING ADDENDUM (Dr. Jimmy Grossman): generalized weakness, near-syncope v. syncope, speech change (transient during episode, now resolved)

## 2018-11-24 NOTE — ED PROVIDER NOTE - CONDUCTED A DETAILED DISCUSSION WITH PATIENT AND/OR GUARDIAN REGARDING, MDM
lab results/**ATTENDING ADDENDUM (Dr. Jimmy Grossman): Anticipatory guidance provided./radiology results

## 2018-11-24 NOTE — H&P ADULT - NSHPSOCIALHISTORY_GEN_ALL_CORE
Social History:    Marital Status: ( x ) , (  ) Single, (  ) , (  ) , (  )   # of Children: 1 daughter, 1 son   Lives with: (  ) alone, (  ) children, ( x ) spouse, (  ) parents, (  ) siblings, (  ) friends, (  ) other:   Occupation:     Substance Use/Illicit Drugs: (  ) never used vs other:   Tobacco Usage: ( x ) never smoked, (  ) former smoker, (  ) current smoker and Total Pack-Years:   Last Alcohol Usage/Frequency/Amount/Withdrawal/Hx of Abuse:  none  Foreign travel/Animal exposure:

## 2018-11-24 NOTE — ED PROVIDER NOTE - OBJECTIVE STATEMENT
83 yo male with a pmh of dementia, htn, hypothyroidism, hld coming in after a near syncopal episode witnessed by his daughter about an hour ago.  Pt was eating dinner, had a moment where he was not responding, leaned forward but did not fall.  Was aroused by his daughter, for a short period appeared confused and was mumbling his speech which quickly resolved.  Pt is back at his baseline according to the daughter.  Pt denies ever having chest pain, palp, or SoB.  No abd pain nausea or vomiting.  Is endorsing a headache described as pressure.  No numbness or weakness.  Nothing made his symptoms worse, spontaneously resolved. 85 yo male with a pmh of dementia, htn, hypothyroidism, hld coming in after a near syncopal episode witnessed by his daughter about an hour ago.  Pt was eating dinner, had a moment where he was not responding, leaned forward but did not fall.  Was aroused by his daughter, for a short period appeared confused and was mumbling his speech which quickly resolved.  Pt is back at his baseline according to the daughter.  Pt denies ever having chest pain, palp, or SoB.  No abd pain nausea or vomiting.  Is endorsing a headache described as pressure.  No numbness or weakness.  Nothing made his symptoms worse, spontaneously resolved.  **ATTENDING ADDENDUM (Dr. Jimmy Grossman): I attest that I have directly examined this patient and elicited a comparable history of present illness and review of systems with my collaborating provider (NP/PA). I attest that I have made significant contributions to the documentation where necessary and as noted in the EMR.

## 2018-11-24 NOTE — ED PROVIDER NOTE - SIGNIFICANT NEGATIVE FINDINGS
**ATTENDING ADDENDUM (Dr. Jimmy Grossman): NO fevers, chills, nausea, vomiting, diarrhea, constipation, incontinence, chest pain, palpitations, shortness of breath, dyspnea on exertion, abdominal pain, frequency, urgency, hesitancy, dysuria, or flank/back pain.

## 2018-11-24 NOTE — ED PROVIDER NOTE - PHYSICAL EXAMINATION
**ATTENDING ADDENDUM (Dr. Jimmy Grossman): I have reviewed and substantially contributed to the elements of the PE as documented above. I have directly performed an examination of this patient in conjunction with the other members (EM resident/PA/NP) of the patient care team. Of note, and in addition to the above, there is NO drift or droop.

## 2018-11-24 NOTE — ED ADULT NURSE NOTE - OBJECTIVE STATEMENT
84 year old male alert and responsive with a pmh of dementia and htn presenting to ed after an episode of syncopal episode during dinner, witnessed by his daughter about an hour ago.  Pt was eating dinner, had a moment where he was not responding, leaned forward but did not fall.  Was aroused by his daughter, for a short period appeared confused and was mumbling his speech and resolved which quickly resolved.  Pt is back at his baseline according to the daughter.  Pt denies ever having chest pain, palp, or SoB.  No abd pain nausea or vomiting. verbalizing a frontal headache described as pressure.  No numbness or weakness.  Nothing made his symptoms worse, spontaneously resolved.

## 2018-11-24 NOTE — ED PROVIDER NOTE - CONSTITUTIONAL, MLM
normal... Well appearing, well nourished, awake, alert providing accurate history according to the daughter

## 2018-11-24 NOTE — H&P ADULT - PROBLEM SELECTOR PLAN 3
- trend CE  - tele  - TTE  - asa loaded - may be myopathy, doubt type 1 acs  - trend CE  - tele  - TTE  - asa loaded

## 2018-11-24 NOTE — ED ADULT TRIAGE NOTE - CHIEF COMPLAINT QUOTE
pt was out to dinner and had a period of unresponsiveness for a few seconds and has since had slurred speech which has since resolved. pt c/o headache now. daughter states pt is slower to move/walk than usual

## 2018-11-24 NOTE — ED PROVIDER NOTE - PLAN OF CARE
ATTENDING ADDENDUM (Dr. Jimmy Grossman): Goals of care include resolution of emergent/urgent symptoms and concerns, and restoration to baseline level of homeostasis.

## 2018-11-24 NOTE — ED PROVIDER NOTE - MEDICAL DECISION MAKING DETAILS
**ATTENDING MEDICAL DECISION MAKING/SYNTHESIS (Dr. Jimmy Grossman): I have reviewed the Chief Complaint, the HPI, the ROS, and have directly performed and confirmed the findings on the Physical Examination. I have reviewed the medical decision making with all providers, as applicable. The PROBLEM REPRESENTATION at this time is: 84-year-old man with history of Hypertension, hyperlipidemia, and prostate cancer, and dementia, s/p recent admission for dehydration, now presenting with near-syncope v. syncope while at dinner with family; noted with transient speech change, now resolved, and reported generalized frontal headache; NO focal but generalized weakness, NO droop or drift. The MOST LIKELY DIAGNOSIS, and the LIST OF DIFFERENTIAL DIAGNOSES, includes (but is not limited to) the following: syncope v. near-syncope (vasovagal v. arrhythmia v. equivalent), cerebrovascular accident, transient ischemic attack, vertebrobasilar insufficiency or equivalent, mass/tumor, serious bacterial infection or sepsis/severe sepsis e.g. meningococcemia, meningitis, encephalitis, or equivalent, dehydration, electrolyte-metabolic-endocrine derangements, vascular cause e.g. carotid dissection or equivalent, demyelinating disorder, orthostasis. The likelihood of each of these diagnoses has been appropriately considered in the context of this patient's presentation and my evaluation. PLAN: as described in EMR, including diagnostics, therapeutics and consultation as clinically warranted. I will continue to reevaluate the patient, including the results of all testing, and monitor response to therapy throughout the patient's course in the ED.

## 2018-11-24 NOTE — ED PROVIDER NOTE - ATTENDING CONTRIBUTION TO CARE
**ATTENDING ADDENDUM (Dr. Jimmy Grossman): I attest that I have directly examined this patient and reviewed and formulated the diagnostic and therapeutic management plan in collaboration with the advanced practitioner (NP, PA). Please see MDM note and remainder of EMR for findings from CC, HPI, ROS, and PE. (Eb)

## 2018-11-24 NOTE — ED PROVIDER NOTE - PROGRESS NOTE DETAILS
**ATTENDING ADDENDUM (Dr. Jimmy Grossman): NO evidence of acute cerebrovascular accident or transient ischemic attack; favor near-syncope in context of patient's story (recent admission for dehydration and diarrhea). Will continue to observe and monitor closely. Anticipatory guidance provided. **ATTENDING ADDENDUM (Dr. iJmmy Grossman): patient serially evaluated throughout ED course. NO acute deterioration up to this time in the ED. ED diagnostics up to this time acknowledged, reviewed and noted. Agree with goals/plan to admit re: patient > 65 years, syncope, indeterminate initial hsTroponin, and elevated CPK.

## 2018-11-24 NOTE — ED ADULT NURSE NOTE - NSIMPLEMENTINTERV_GEN_ALL_ED
Implemented All Fall with Harm Risk Interventions:  [place] to call system. Call bell, personal items and telephone within reach. Instruct patient to call for assistance. Room bathroom lighting operational. Non-slip footwear when patient is off stretcher. Physically safe environment: no spills, clutter or unnecessary equipment. Stretcher in lowest position, wheels locked, appropriate side rails in place. Provide visual cue, wrist band, yellow gown, etc. Monitor gait and stability. Monitor for mental status changes and reorient to person, place, and time. Review medications for side effects contributing to fall risk. Reinforce activity limits and safety measures with patient and family. Provide visual clues: red socks.

## 2018-11-24 NOTE — ED PROVIDER NOTE - NS ED ROS FT
**ATTENDING ADDENDUM (Dr. Jimmy Grossman): history of prostate cancer, Hypertension, and hyperlipidemia

## 2018-11-25 LAB
ALBUMIN SERPL ELPH-MCNC: 3.7 G/DL — SIGNIFICANT CHANGE UP (ref 3.3–5)
ALP SERPL-CCNC: 44 U/L — SIGNIFICANT CHANGE UP (ref 40–120)
ALT FLD-CCNC: 18 U/L — SIGNIFICANT CHANGE UP (ref 10–45)
ANION GAP SERPL CALC-SCNC: 12 MMOL/L — SIGNIFICANT CHANGE UP (ref 5–17)
APPEARANCE UR: ABNORMAL
APPEARANCE UR: ABNORMAL
APTT BLD: 31.7 SEC — SIGNIFICANT CHANGE UP (ref 27.5–36.3)
AST SERPL-CCNC: 34 U/L — SIGNIFICANT CHANGE UP (ref 10–40)
BACTERIA # UR AUTO: NEGATIVE — SIGNIFICANT CHANGE UP
BASOPHILS # BLD AUTO: 0.01 K/UL — SIGNIFICANT CHANGE UP (ref 0–0.2)
BASOPHILS NFR BLD AUTO: 0.2 % — SIGNIFICANT CHANGE UP (ref 0–2)
BILIRUB SERPL-MCNC: 0.3 MG/DL — SIGNIFICANT CHANGE UP (ref 0.2–1.2)
BILIRUB UR-MCNC: ABNORMAL
BILIRUB UR-MCNC: ABNORMAL
BUN SERPL-MCNC: 20 MG/DL — SIGNIFICANT CHANGE UP (ref 7–23)
CALCIUM SERPL-MCNC: 8.3 MG/DL — LOW (ref 8.4–10.5)
CHLORIDE SERPL-SCNC: 100 MMOL/L — SIGNIFICANT CHANGE UP (ref 96–108)
CHOLEST SERPL-MCNC: 222 MG/DL — HIGH (ref 10–199)
CK MB BLD-MCNC: 2 % — SIGNIFICANT CHANGE UP (ref 0–3.5)
CK MB CFR SERPL CALC: 10 NG/ML — HIGH (ref 0–6.7)
CK MB CFR SERPL CALC: 10.6 NG/ML — HIGH (ref 0–6.7)
CK SERPL-CCNC: 525 U/L — HIGH (ref 30–200)
CK SERPL-CCNC: 559 U/L — HIGH (ref 30–200)
CO2 SERPL-SCNC: 24 MMOL/L — SIGNIFICANT CHANGE UP (ref 22–31)
COLOR SPEC: YELLOW — SIGNIFICANT CHANGE UP
COLOR SPEC: YELLOW — SIGNIFICANT CHANGE UP
CREAT ?TM UR-MCNC: 319 MG/DL — SIGNIFICANT CHANGE UP
CREAT SERPL-MCNC: 1.07 MG/DL — SIGNIFICANT CHANGE UP (ref 0.5–1.3)
DIFF PNL FLD: NEGATIVE — SIGNIFICANT CHANGE UP
DIFF PNL FLD: NEGATIVE — SIGNIFICANT CHANGE UP
EOSINOPHIL # BLD AUTO: 0.05 K/UL — SIGNIFICANT CHANGE UP (ref 0–0.5)
EOSINOPHIL NFR BLD AUTO: 0.9 % — SIGNIFICANT CHANGE UP (ref 0–6)
EPI CELLS # UR: 11 /HPF — HIGH
GLUCOSE SERPL-MCNC: 81 MG/DL — SIGNIFICANT CHANGE UP (ref 70–99)
GLUCOSE UR QL: ABNORMAL
GLUCOSE UR QL: ABNORMAL
HBA1C BLD-MCNC: 5.5 % — SIGNIFICANT CHANGE UP (ref 4–5.6)
HCT VFR BLD CALC: 36.7 % — LOW (ref 39–50)
HDLC SERPL-MCNC: 71 MG/DL — SIGNIFICANT CHANGE UP
HGB BLD-MCNC: 11.8 G/DL — LOW (ref 13–17)
HYALINE CASTS # UR AUTO: 8 /LPF — HIGH (ref 0–7)
IMM GRANULOCYTES NFR BLD AUTO: 0.2 % — SIGNIFICANT CHANGE UP (ref 0–1.5)
INR BLD: 1.06 RATIO — SIGNIFICANT CHANGE UP (ref 0.88–1.16)
KETONES UR-MCNC: SIGNIFICANT CHANGE UP
KETONES UR-MCNC: SIGNIFICANT CHANGE UP
LEUKOCYTE ESTERASE UR-ACNC: NEGATIVE — SIGNIFICANT CHANGE UP
LEUKOCYTE ESTERASE UR-ACNC: NEGATIVE — SIGNIFICANT CHANGE UP
LIPID PNL WITH DIRECT LDL SERPL: 144 MG/DL — HIGH
LYMPHOCYTES # BLD AUTO: 1.4 K/UL — SIGNIFICANT CHANGE UP (ref 1–3.3)
LYMPHOCYTES # BLD AUTO: 26.1 % — SIGNIFICANT CHANGE UP (ref 13–44)
MAGNESIUM SERPL-MCNC: 2.2 MG/DL — SIGNIFICANT CHANGE UP (ref 1.6–2.6)
MCHC RBC-ENTMCNC: 28.8 PG — SIGNIFICANT CHANGE UP (ref 27–34)
MCHC RBC-ENTMCNC: 32.2 GM/DL — SIGNIFICANT CHANGE UP (ref 32–36)
MCV RBC AUTO: 89.5 FL — SIGNIFICANT CHANGE UP (ref 80–100)
MONOCYTES # BLD AUTO: 0.44 K/UL — SIGNIFICANT CHANGE UP (ref 0–0.9)
MONOCYTES NFR BLD AUTO: 8.2 % — SIGNIFICANT CHANGE UP (ref 2–14)
NEUTROPHILS # BLD AUTO: 3.46 K/UL — SIGNIFICANT CHANGE UP (ref 1.8–7.4)
NEUTROPHILS NFR BLD AUTO: 64.4 % — SIGNIFICANT CHANGE UP (ref 43–77)
NITRITE UR-MCNC: NEGATIVE — SIGNIFICANT CHANGE UP
NITRITE UR-MCNC: NEGATIVE — SIGNIFICANT CHANGE UP
PH UR: 5.5 — SIGNIFICANT CHANGE UP (ref 5–8)
PH UR: 6 — SIGNIFICANT CHANGE UP (ref 5–8)
PHOSPHATE SERPL-MCNC: 3.6 MG/DL — SIGNIFICANT CHANGE UP (ref 2.5–4.5)
PLATELET # BLD AUTO: 225 K/UL — SIGNIFICANT CHANGE UP (ref 150–400)
POTASSIUM SERPL-MCNC: 3.9 MMOL/L — SIGNIFICANT CHANGE UP (ref 3.5–5.3)
POTASSIUM SERPL-SCNC: 3.9 MMOL/L — SIGNIFICANT CHANGE UP (ref 3.5–5.3)
PROT ?TM UR-MCNC: 89 MG/DL — HIGH (ref 0–12)
PROT SERPL-MCNC: 6.4 G/DL — SIGNIFICANT CHANGE UP (ref 6–8.3)
PROT UR-MCNC: ABNORMAL
PROT UR-MCNC: ABNORMAL
PROTHROM AB SERPL-ACNC: 11.9 SEC — SIGNIFICANT CHANGE UP (ref 10–13.1)
RBC # BLD: 4.1 M/UL — LOW (ref 4.2–5.8)
RBC # FLD: 15 % — HIGH (ref 10.3–14.5)
RBC CASTS # UR COMP ASSIST: 21 /HPF — HIGH (ref 0–4)
SODIUM SERPL-SCNC: 136 MMOL/L — SIGNIFICANT CHANGE UP (ref 135–145)
SODIUM UR-SCNC: <20 MMOL/L — SIGNIFICANT CHANGE UP
SP GR SPEC: 1.03 — HIGH (ref 1.01–1.02)
SP GR SPEC: 1.03 — HIGH (ref 1.01–1.02)
TOTAL CHOLESTEROL/HDL RATIO MEASUREMENT: 3.1 RATIO — LOW (ref 3.4–9.6)
TRIGL SERPL-MCNC: 33 MG/DL — SIGNIFICANT CHANGE UP (ref 10–149)
TROPONIN T, HIGH SENSITIVITY RESULT: 27 NG/L — SIGNIFICANT CHANGE UP (ref 0–51)
TROPONIN T, HIGH SENSITIVITY RESULT: 29 NG/L — SIGNIFICANT CHANGE UP (ref 0–51)
TSH SERPL-MCNC: 105.2 UIU/ML — HIGH (ref 0.27–4.2)
UROBILINOGEN FLD QL: ABNORMAL
UROBILINOGEN FLD QL: ABNORMAL
UUN UR-MCNC: 927 MG/DL — SIGNIFICANT CHANGE UP
WBC # BLD: 5.37 K/UL — SIGNIFICANT CHANGE UP (ref 3.8–10.5)
WBC # FLD AUTO: 5.37 K/UL — SIGNIFICANT CHANGE UP (ref 3.8–10.5)
WBC UR QL: 15 /HPF — HIGH (ref 0–5)

## 2018-11-25 PROCEDURE — 76770 US EXAM ABDO BACK WALL COMP: CPT | Mod: 26

## 2018-11-25 RX ADMIN — HEPARIN SODIUM 5000 UNIT(S): 5000 INJECTION INTRAVENOUS; SUBCUTANEOUS at 21:49

## 2018-11-25 RX ADMIN — ATORVASTATIN CALCIUM 10 MILLIGRAM(S): 80 TABLET, FILM COATED ORAL at 21:49

## 2018-11-25 RX ADMIN — HEPARIN SODIUM 5000 UNIT(S): 5000 INJECTION INTRAVENOUS; SUBCUTANEOUS at 14:13

## 2018-11-25 RX ADMIN — Medication 81 MILLIGRAM(S): at 12:22

## 2018-11-25 RX ADMIN — MEMANTINE HYDROCHLORIDE 10 MILLIGRAM(S): 10 TABLET ORAL at 21:49

## 2018-11-25 RX ADMIN — Medication 175 MICROGRAM(S): at 05:25

## 2018-11-25 RX ADMIN — Medication 100 MILLIEQUIVALENT(S): at 01:15

## 2018-11-25 RX ADMIN — HEPARIN SODIUM 5000 UNIT(S): 5000 INJECTION INTRAVENOUS; SUBCUTANEOUS at 05:25

## 2018-11-25 RX ADMIN — MEMANTINE HYDROCHLORIDE 10 MILLIGRAM(S): 10 TABLET ORAL at 12:21

## 2018-11-25 NOTE — PROGRESS NOTE ADULT - SUBJECTIVE AND OBJECTIVE BOX
Patient is a 84y old  Male who presents with a chief complaint of unresponsiveness, confusion (2018 23:21)      SUBJECTIVE / OVERNIGHT EVENTS:   Feels better.  Denies CP/SOB/Palpitation/HA.    MEDICATIONS  (STANDING):  aspirin enteric coated 81 milliGRAM(s) Oral daily  atorvastatin 10 milliGRAM(s) Oral at bedtime  heparin  Injectable 5000 Unit(s) SubCutaneous every 8 hours  influenza   Vaccine 0.5 milliLiter(s) IntraMuscular once  lactated ringers. 1000 milliLiter(s) (75 mL/Hr) IV Continuous <Continuous>  levothyroxine 175 MICROGram(s) Oral daily  memantine 10 milliGRAM(s) Oral two times a day    MEDICATIONS  (PRN):        CAPILLARY BLOOD GLUCOSE        I&O's Summary    2018 07:  -  2018 07:00  --------------------------------------------------------  IN: 500 mL / OUT: 200 mL / NET: 300 mL    2018 07:  -  2018 22:33  --------------------------------------------------------  IN: 780 mL / OUT: 750 mL / NET: 30 mL        PHYSICAL EXAM:    NECK: Supple, No JVD  CHEST/LUNG: Clear to auscultation bilaterally; No wheezing.  HEART: Regular rate and rhythm; No murmurs, rubs, or gallops  ABDOMEN: Soft, Nontender, Nondistended; Bowel sounds present  EXTREMITIES:   No clubbing, cyanosis, or edema  NEUROLOGY: Awake    LABS:                        11.8   5.37  )-----------( 225      ( 2018 08:37 )             36.7         136  |  100  |  20  ----------------------------<  81  3.9   |  24  |  1.07    Ca    8.3<L>      2018 06:22  Phos  3.6     -  Mg     2.2         TPro  6.4  /  Alb  3.7  /  TBili  0.3  /  DBili  x   /  AST  34  /  ALT  18  /  AlkPhos  44      PT/INR - ( 2018 08:29 )   PT: 11.9 sec;   INR: 1.06 ratio         PTT - ( 2018 08:29 )  PTT:31.7 sec  CARDIAC MARKERS ( 2018 06:22 )  x     / x     / 525 U/L / x     / 10.6 ng/mL  CARDIAC MARKERS ( 2018 23:51 )  x     / x     / 559 U/L / x     / 10.0 ng/mL  CARDIAC MARKERS ( 2018 18:21 )  x     / x     / 626 U/L / x     / x          Urinalysis Basic - ( 2018 00:14 )    Color: Yellow / Appearance: Slightly Turbid / S.027 / pH: x  Gluc: x / Ketone: Trace  / Bili: Small / Urobili: 2 mg/dL   Blood: x / Protein: 100 mg/dL / Nitrite: Negative   Leuk Esterase: Negative / RBC: x / WBC x   Sq Epi: x / Non Sq Epi: x / Bacteria: x      CAPILLARY BLOOD GLUCOSE                    RADIOLOGY & ADDITIONAL TESTS:    Imaging Personally Reviewed:    Consultant(s) Notes Reviewed:      Care Discussed with Consultants/Other Providers:

## 2018-11-25 NOTE — PROGRESS NOTE ADULT - ASSESSMENT
· Assessment	  84M c hx advanced alzheimer's dementia (A&Ox1-2 baseline), HTN, HLD, prostate ca s/p prostatectomy, thyroid ca s/p thyroidectomy pw acute metabolic encephalopathy, RUSS.     Problem/Plan - 1:  ·  Problem: RUSS (acute kidney injury).  Plan: - normal Cr 2 days ago  BMP     Problem/Plan - 2:  ·  Problem: Acute metabolic encephalopathy.  Plan: - Resolving   Problem/Plan - 3:  ·  Problem: Cardiac enzymes elevated.  Plan: -   - Cardio eval  -Tele  -   Problem/Plan - 4:  ·  Problem: Essential hypertension.  Plan: -  norvasc      Problem/Plan - 5:  ·  Problem: Alzheimer's dementia without behavioral disturbance, unspecified timing of dementia onset.  Plan: - cont namenda  - PT eval

## 2018-11-26 LAB
CULTURE RESULTS: SIGNIFICANT CHANGE UP
SPECIMEN SOURCE: SIGNIFICANT CHANGE UP
T3 SERPL-MCNC: 37 NG/DL — LOW (ref 80–200)
T4 AB SER-ACNC: 3.6 UG/DL — LOW (ref 4.6–12)

## 2018-11-26 PROCEDURE — 93306 TTE W/DOPPLER COMPLETE: CPT | Mod: 26

## 2018-11-26 RX ADMIN — MEMANTINE HYDROCHLORIDE 10 MILLIGRAM(S): 10 TABLET ORAL at 22:15

## 2018-11-26 RX ADMIN — HEPARIN SODIUM 5000 UNIT(S): 5000 INJECTION INTRAVENOUS; SUBCUTANEOUS at 22:16

## 2018-11-26 RX ADMIN — HEPARIN SODIUM 5000 UNIT(S): 5000 INJECTION INTRAVENOUS; SUBCUTANEOUS at 05:54

## 2018-11-26 RX ADMIN — Medication 175 MICROGRAM(S): at 05:54

## 2018-11-26 RX ADMIN — HEPARIN SODIUM 5000 UNIT(S): 5000 INJECTION INTRAVENOUS; SUBCUTANEOUS at 14:48

## 2018-11-26 RX ADMIN — Medication 81 MILLIGRAM(S): at 12:54

## 2018-11-26 RX ADMIN — ATORVASTATIN CALCIUM 10 MILLIGRAM(S): 80 TABLET, FILM COATED ORAL at 22:15

## 2018-11-26 RX ADMIN — MEMANTINE HYDROCHLORIDE 10 MILLIGRAM(S): 10 TABLET ORAL at 12:54

## 2018-11-26 NOTE — PROGRESS NOTE ADULT - ASSESSMENT
· Assessment	  84M c hx advanced alzheimer's dementia (A&Ox1-2 baseline), HTN, HLD, prostate ca s/p prostatectomy, thyroid ca s/p thyroidectomy pw acute metabolic encephalopathy, RUSS.     Problem/Plan - 1:  ·  Problem: RUSS (acute kidney injury).  Plan: -   Cr 1.07     Problem/Plan - 2:  ·  Problem: Acute metabolic encephalopathy.  Plan: - Resolving   Problem/Plan - 3:  ·  Problem: Cardiac enzymes elevated.  Plan: -   - Cardio eval    Problem/Plan - 4:  ·  Problem: Essential hypertension.  Plan: -  Norvasc      Problem/Plan - 5:  ·  Problem: Alzheimer's dementia without behavioral disturbance, unspecified timing of dementia onset.  Plan: - Cont namenda    Dw pt's daughter

## 2018-11-26 NOTE — PROGRESS NOTE ADULT - SUBJECTIVE AND OBJECTIVE BOX
Patient is a 84y old  Male who presents with a chief complaint of unresponsiveness, confusion (2018 16:32)      SUBJECTIVE / OVERNIGHT EVENTS:   Feels better.  Denies CP/SOB/Palpitation/HA.    MEDICATIONS  (STANDING):  aspirin enteric coated 81 milliGRAM(s) Oral daily  atorvastatin 10 milliGRAM(s) Oral at bedtime  heparin  Injectable 5000 Unit(s) SubCutaneous every 8 hours  influenza   Vaccine 0.5 milliLiter(s) IntraMuscular once  lactated ringers. 1000 milliLiter(s) (75 mL/Hr) IV Continuous <Continuous>  levothyroxine 175 MICROGram(s) Oral daily  memantine 10 milliGRAM(s) Oral two times a day    MEDICATIONS  (PRN):        CAPILLARY BLOOD GLUCOSE        I&O's Summary    2018 07:  -  2018 07:00  --------------------------------------------------------  IN: 880 mL / OUT: 1075 mL / NET: -195 mL    2018 07:  -  2018 22:47  --------------------------------------------------------  IN: 520 mL / OUT: 0 mL / NET: 520 mL        PHYSICAL EXAM:    NECK: Supple, No JVD  CHEST/LUNG: Clear to auscultation bilaterally; No wheezing.  HEART: Regular rate and rhythm; No murmurs, rubs, or gallops  ABDOMEN: Soft, Nontender, Nondistended; Bowel sounds present  EXTREMITIES:   No clubbing, cyanosis, or edema  NEUROLOGY: Awake    LABS:                        11.8   5.37  )-----------( 225      ( 2018 08:37 )             36.7         136  |  100  |  20  ----------------------------<  81  3.9   |  24  |  1.07    Ca    8.3<L>      2018 06:22  Phos  3.6     11-25  Mg     2.2         TPro  6.4  /  Alb  3.7  /  TBili  0.3  /  DBili  x   /  AST  34  /  ALT  18  /  AlkPhos  44      PT/INR - ( 2018 08:29 )   PT: 11.9 sec;   INR: 1.06 ratio         PTT - ( 2018 08:29 )  PTT:31.7 sec  CARDIAC MARKERS ( 2018 06:22 )  x     / x     / 525 U/L / x     / 10.6 ng/mL  CARDIAC MARKERS ( 2018 23:51 )  x     / x     / 559 U/L / x     / 10.0 ng/mL      Urinalysis Basic - ( 2018 00:14 )    Color: Yellow / Appearance: Slightly Turbid / S.027 / pH: x  Gluc: x / Ketone: Trace  / Bili: Small / Urobili: 2 mg/dL   Blood: x / Protein: 100 mg/dL / Nitrite: Negative   Leuk Esterase: Negative / RBC: x / WBC x   Sq Epi: x / Non Sq Epi: x / Bacteria: x      CAPILLARY BLOOD GLUCOSE         @ 06:10  Culture-urine --  Culture results   <10,000 CFU/ml Normal Urogenital dong present  method type --  Organism --  Organism Identification --  Specimen source .Urine Clean Catch (Midstream)            @ 06:10  Culture blood --  Culture results   <10,000 CFU/ml Normal Urogenital dong present  Gram stain --  Gram stain blood --  Method type --  Organism --  Organism identification --  Specimen source .Urine Clean Catch (Midstream)      RADIOLOGY & ADDITIONAL TESTS:    Imaging Personally Reviewed:    Consultant(s) Notes Reviewed:      Care Discussed with Consultants/Other Providers:

## 2018-11-26 NOTE — PHYSICAL THERAPY INITIAL EVALUATION ADULT - ADDITIONAL COMMENTS
1 episode of vomiting associated with a frontal HA. Visit to ED 2 days pta for diarrhea/incontinence. 1 episode of vomiting associated with a frontal HA. Visit to ED 2 days pta for diarrhea/incontinence. Home situation/prior level of function- As per H&P, pt was independent ambulator with no assistive device pta, no recent history of falls. As per pt, lives with wife in private house, 3-4 steps to enter, 1 flight to bedroom, (+)rail.

## 2018-11-26 NOTE — PHYSICAL THERAPY INITIAL EVALUATION ADULT - PERTINENT HX OF CURRENT PROBLEM, REHAB EVAL
84 y/oM with PMH of advanced alzheimer's dementia (A&Ox1-2 baseline), HTN, HLD, prostate ca s/p prostatectomy, thyroid ca s/p thyroidectomy presents with confusion, unresponsiveness. Found to have Acute metabolic encephalopathy, RUSS, elevated cardiac enzymes. As per H&P, pt has had progressive dementia of the last 10 years. No falls this year. On day of admission, became unresponsive for 1-2 min. When came to had slurred speech which improved over the following minutes. (cont below)

## 2018-11-27 ENCOUNTER — TRANSCRIPTION ENCOUNTER (OUTPATIENT)
Age: 83
End: 2018-11-27

## 2018-11-27 VITALS — WEIGHT: 142.86 LBS

## 2018-11-27 LAB
ANION GAP SERPL CALC-SCNC: 11 MMOL/L — SIGNIFICANT CHANGE UP (ref 5–17)
BUN SERPL-MCNC: 17 MG/DL — SIGNIFICANT CHANGE UP (ref 7–23)
CALCIUM SERPL-MCNC: 8.2 MG/DL — LOW (ref 8.4–10.5)
CHLORIDE SERPL-SCNC: 101 MMOL/L — SIGNIFICANT CHANGE UP (ref 96–108)
CO2 SERPL-SCNC: 27 MMOL/L — SIGNIFICANT CHANGE UP (ref 22–31)
CREAT SERPL-MCNC: 0.97 MG/DL — SIGNIFICANT CHANGE UP (ref 0.5–1.3)
GLUCOSE SERPL-MCNC: 82 MG/DL — SIGNIFICANT CHANGE UP (ref 70–99)
HCT VFR BLD CALC: 33.7 % — LOW (ref 39–50)
HGB BLD-MCNC: 11.1 G/DL — LOW (ref 13–17)
MCHC RBC-ENTMCNC: 30.2 PG — SIGNIFICANT CHANGE UP (ref 27–34)
MCHC RBC-ENTMCNC: 32.9 GM/DL — SIGNIFICANT CHANGE UP (ref 32–36)
MCV RBC AUTO: 91.8 FL — SIGNIFICANT CHANGE UP (ref 80–100)
PLATELET # BLD AUTO: 207 K/UL — SIGNIFICANT CHANGE UP (ref 150–400)
POTASSIUM SERPL-MCNC: 3.5 MMOL/L — SIGNIFICANT CHANGE UP (ref 3.5–5.3)
POTASSIUM SERPL-SCNC: 3.5 MMOL/L — SIGNIFICANT CHANGE UP (ref 3.5–5.3)
RBC # BLD: 3.67 M/UL — LOW (ref 4.2–5.8)
RBC # FLD: 15.3 % — HIGH (ref 10.3–14.5)
SODIUM SERPL-SCNC: 139 MMOL/L — SIGNIFICANT CHANGE UP (ref 135–145)
WBC # BLD: 3.78 K/UL — LOW (ref 3.8–10.5)
WBC # FLD AUTO: 3.78 K/UL — LOW (ref 3.8–10.5)

## 2018-11-27 PROCEDURE — 85027 COMPLETE CBC AUTOMATED: CPT

## 2018-11-27 PROCEDURE — 99283 EMERGENCY DEPT VISIT LOW MDM: CPT

## 2018-11-27 PROCEDURE — 84484 ASSAY OF TROPONIN QUANT: CPT

## 2018-11-27 PROCEDURE — 93306 TTE W/DOPPLER COMPLETE: CPT

## 2018-11-27 PROCEDURE — 99285 EMERGENCY DEPT VISIT HI MDM: CPT | Mod: 25

## 2018-11-27 PROCEDURE — 82962 GLUCOSE BLOOD TEST: CPT

## 2018-11-27 PROCEDURE — 84540 ASSAY OF URINE/UREA-N: CPT

## 2018-11-27 PROCEDURE — 82803 BLOOD GASES ANY COMBINATION: CPT

## 2018-11-27 PROCEDURE — 71046 X-RAY EXAM CHEST 2 VIEWS: CPT

## 2018-11-27 PROCEDURE — 82570 ASSAY OF URINE CREATININE: CPT

## 2018-11-27 PROCEDURE — 83690 ASSAY OF LIPASE: CPT

## 2018-11-27 PROCEDURE — 87086 URINE CULTURE/COLONY COUNT: CPT

## 2018-11-27 PROCEDURE — 70450 CT HEAD/BRAIN W/O DYE: CPT

## 2018-11-27 PROCEDURE — 83036 HEMOGLOBIN GLYCOSYLATED A1C: CPT

## 2018-11-27 PROCEDURE — 76770 US EXAM ABDO BACK WALL COMP: CPT

## 2018-11-27 PROCEDURE — 80061 LIPID PANEL: CPT

## 2018-11-27 PROCEDURE — 97161 PT EVAL LOW COMPLEX 20 MIN: CPT

## 2018-11-27 PROCEDURE — 82947 ASSAY GLUCOSE BLOOD QUANT: CPT

## 2018-11-27 PROCEDURE — 82553 CREATINE MB FRACTION: CPT

## 2018-11-27 PROCEDURE — 82330 ASSAY OF CALCIUM: CPT

## 2018-11-27 PROCEDURE — 84100 ASSAY OF PHOSPHORUS: CPT

## 2018-11-27 PROCEDURE — 82565 ASSAY OF CREATININE: CPT

## 2018-11-27 PROCEDURE — 80048 BASIC METABOLIC PNL TOTAL CA: CPT

## 2018-11-27 PROCEDURE — 84300 ASSAY OF URINE SODIUM: CPT

## 2018-11-27 PROCEDURE — 82435 ASSAY OF BLOOD CHLORIDE: CPT

## 2018-11-27 PROCEDURE — 81001 URINALYSIS AUTO W/SCOPE: CPT

## 2018-11-27 PROCEDURE — 85610 PROTHROMBIN TIME: CPT

## 2018-11-27 PROCEDURE — 84295 ASSAY OF SERUM SODIUM: CPT

## 2018-11-27 PROCEDURE — 80053 COMPREHEN METABOLIC PANEL: CPT

## 2018-11-27 PROCEDURE — 84156 ASSAY OF PROTEIN URINE: CPT

## 2018-11-27 PROCEDURE — 84443 ASSAY THYROID STIM HORMONE: CPT

## 2018-11-27 PROCEDURE — 85730 THROMBOPLASTIN TIME PARTIAL: CPT

## 2018-11-27 PROCEDURE — 84480 ASSAY TRIIODOTHYRONINE (T3): CPT

## 2018-11-27 PROCEDURE — 83735 ASSAY OF MAGNESIUM: CPT

## 2018-11-27 PROCEDURE — 93005 ELECTROCARDIOGRAM TRACING: CPT

## 2018-11-27 PROCEDURE — 83605 ASSAY OF LACTIC ACID: CPT

## 2018-11-27 PROCEDURE — 84436 ASSAY OF TOTAL THYROXINE: CPT

## 2018-11-27 PROCEDURE — 85014 HEMATOCRIT: CPT

## 2018-11-27 PROCEDURE — 82550 ASSAY OF CK (CPK): CPT

## 2018-11-27 PROCEDURE — 84132 ASSAY OF SERUM POTASSIUM: CPT

## 2018-11-27 RX ORDER — LISINOPRIL 2.5 MG/1
1 TABLET ORAL
Qty: 0 | Refills: 0 | COMMUNITY
Start: 2018-11-27

## 2018-11-27 RX ORDER — ASPIRIN/CALCIUM CARB/MAGNESIUM 324 MG
1 TABLET ORAL
Qty: 0 | Refills: 0 | COMMUNITY

## 2018-11-27 RX ORDER — LISINOPRIL 2.5 MG/1
5 TABLET ORAL DAILY
Qty: 0 | Refills: 0 | Status: DISCONTINUED | OUTPATIENT
Start: 2018-11-27 | End: 2018-11-27

## 2018-11-27 RX ORDER — POTASSIUM CHLORIDE 20 MEQ
40 PACKET (EA) ORAL ONCE
Qty: 0 | Refills: 0 | Status: COMPLETED | OUTPATIENT
Start: 2018-11-27 | End: 2018-11-27

## 2018-11-27 RX ORDER — LISINOPRIL 2.5 MG/1
1 TABLET ORAL
Qty: 30 | Refills: 0 | OUTPATIENT
Start: 2018-11-27 | End: 2018-12-26

## 2018-11-27 RX ORDER — CHOLECALCIFEROL (VITAMIN D3) 125 MCG
1 CAPSULE ORAL
Qty: 0 | Refills: 0 | COMMUNITY

## 2018-11-27 RX ADMIN — MEMANTINE HYDROCHLORIDE 10 MILLIGRAM(S): 10 TABLET ORAL at 12:06

## 2018-11-27 RX ADMIN — Medication 175 MICROGRAM(S): at 06:20

## 2018-11-27 RX ADMIN — Medication 81 MILLIGRAM(S): at 12:06

## 2018-11-27 RX ADMIN — Medication 40 MILLIEQUIVALENT(S): at 09:24

## 2018-11-27 RX ADMIN — HEPARIN SODIUM 5000 UNIT(S): 5000 INJECTION INTRAVENOUS; SUBCUTANEOUS at 06:20

## 2018-11-27 RX ADMIN — LISINOPRIL 5 MILLIGRAM(S): 2.5 TABLET ORAL at 08:48

## 2018-11-27 NOTE — DISCHARGE NOTE ADULT - MEDICATION SUMMARY - MEDICATIONS TO TAKE
I will START or STAY ON the medications listed below when I get home from the hospital:    aspirin 81 mg oral tablet  -- 1 tab(s) by mouth once a day  -- Indication: For Coronary artery disease     lisinopril 5 mg oral tablet  -- 1 tab(s) by mouth once a day  -- Indication: For HTN (hypertension)    atorvastatin 10 mg oral tablet  -- 1 tab(s) by mouth once a day  -- Indication: For Hyperlipidemia     memantine 10 mg oral tablet  -- 1 tab(s) by mouth 2 times a day  -- Indication: For dementia     levothyroxine 175 mcg (0.175 mg) oral tablet  -- 1 tab(s) by mouth once a day  -- Indication: For Hypothyroid

## 2018-11-27 NOTE — DISCHARGE NOTE ADULT - PATIENT PORTAL LINK FT
You can access the "[employer] Patient Portal, offered by [facility], by registering with the following website: [email or web address]

## 2018-11-27 NOTE — CONSULT NOTE ADULT - ASSESSMENT
84M c hx advanced alzheimer's dementia (A&Ox1-2 baseline), HTN, HLD, prostate ca s/p prostatectomy, thyroid ca s/p thyroidectomy presents with confusion, unresponsiveness.        Problem/Plan - 1:  ·  Problem: Acute metabolic encephalopathy.  Plan: - Episode unresponsiveness at rest.  Likely vasovagal 2/2 hypovolemia improved    Problem/Plan - 2:  ·  Problem: Cardiac enzymes elevated.  Plan: -Noted LV dysfunction new since 2017.  Likely CAD -would treat conservatively.  Aspirin,statin.  No chest pain noted.  Add ACEI-Lisinopril 5 mg daily      Problem/Plan - 3:  ·  Problem: RUSS (acute kidney injury).  Plan:2/2 hypovolemia-resolved      Problem/Plan - 4:  ·  Problem: Essential hypertension.  Plan: - hold norvasc for now.   Start Lisinopril 5 mg daily    Problem/Plan - 5:  ·  Problem: Alzheimer's dementia without behavioral disturbance, unspecified timing of dementia onset.  Plan: - cont Namenda

## 2018-11-27 NOTE — DISCHARGE NOTE ADULT - HOSPITAL COURSE
84M c hx advanced alzheimer's dementia (A&Ox1-2 baseline), HTN, HLD, prostate ca s/p prostatectomy, thyroid ca s/p thyroidectomy presents with confusion, unresponsiveness. 84M c hx advanced alzheimer's dementia (A&Ox1-2 baseline), HTN, HLD, prostate ca s/p prostatectomy, thyroid ca s/p thyroidectomy presents with confusion, unresponsiveness. You  had an Echo which revealed EF 34 % and decreased lv function. You were evaluated by cardiologist and recommended for conservative management. You are medically stable for discharge with close follow up./

## 2018-11-27 NOTE — CONSULT NOTE ADULT - ATTENDING COMMENTS
Thank you for the courtesy of the consultation,I would be available for any further discussion if needed.  Cosme Koch MD,FACC.  [address] [place], [place] [id number] [phone]

## 2018-11-27 NOTE — DISCHARGE NOTE ADULT - CARE PLAN
Principal Discharge DX:	Syncope, unspecified syncope type  Goal:	stable  Assessment and plan of treatment:	!psyncope  Secondary Diagnosis:	HTN (hypertension)  Goal:	stable  Assessment and plan of treatment:	Follow up with your medical doctor to establish long term blood pressure treatment goals.  Secondary Diagnosis:	Cardiac enzymes elevated  Goal:	stable  Secondary Diagnosis:	HLD (hyperlipidemia)  Goal:	stable  Assessment and plan of treatment:	c/w meds as prescribed Principal Discharge DX:	Syncope, unspecified syncope type  Goal:	stable  Assessment and plan of treatment:	HOME CARE INSTRUCTIONS  Have someone stay with you until you feel stable.  Do not drive, operate machinery, or play sports until your caregiver says it is okay.  Keep all follow-up appointments as directed by your caregiver.   Lie down right away if you start feeling like you might faint. Breathe deeply and steadily. Wait until all the symptoms have passed.Drink enough fluids to keep your urine clear or pale yellow.  If you are taking blood pressure or heart medicine, get up slowly, taking several minutes to sit and then stand. This can reduce dizziness.  SEEK IMMEDIATE MEDICAL CARE IF:  You have a severe headache.  You have unusual pain in the chest, abdomen, or back.  You are bleeding from the mouth or rectum, or you have black or tarry stool.  You have an irregular or very fast heartbeat.  You have pain with breathing.  You have repeated fainting or seizure-like jerking during an episode.  You faint when sitting or lying down.  You have confusion.  You have difficulty walking.  You have severe weakness.  You have vision problems.  If you fainted, call your local emergency services (_____________________). Do not drive yourself to the hospital  Secondary Diagnosis:	HTN (hypertension)  Goal:	stable  Assessment and plan of treatment:	Follow up with your medical doctor to establish long term blood pressure treatment goals.  Secondary Diagnosis:	Cardiac enzymes elevated  Goal:	stable  Assessment and plan of treatment:	c/w conservative treatment   per cardiology  Secondary Diagnosis:	HLD (hyperlipidemia)  Goal:	stable  Assessment and plan of treatment:	c/w meds as prescribed

## 2018-11-27 NOTE — CONSULT NOTE ADULT - SUBJECTIVE AND OBJECTIVE BOX
CHIEF COMPLAINT:Unresponsiveness    HPI:-84 yr old Male  PMH  Advanced Alzheimer's dementia (A&Ox1-2 baseline), HTN, HLD, prostate ca s/p prostatectomy, thyroid ca s/p thyroidectomy presents with confusion, unresponsiveness.    History obtained from wife (Magali Wong) and daughter (Larisa Wong [phone]) at bedside. Pt is a poor historian, but has pleasant affect. Pt has slowly progressive dementia over at least the past 10 years. Pt has had episodes of increased confusion at times. No falls this year, requires no assistive device, and no hx of heart disease. Today, while at dinner with his family, became unresponsive for about 1-2 minutes, witnessed. Pt appeared to be leaning over his plate, and not responding to questions. Then when he came to, he was having a little slurring of his speech, which slowly improved over the following minutes, followed by 1 episode vomiting and associated with a frontal headache. Pt was here in the ED 2 days ago for an episode of diarrhea and fecal incontinence. He wears diapers and intermittently will not be continent of stool or urine. Pt currently denies any symptoms. Denies CP, SOB, fevers, chills, URI symptoms. Pt has good appetite.Was hospitalized in 2017 for chest pain-ACS ruled out-knowm LBBB on ECG.      VS: Tm 98.8, P 80, /70, R 20, 100% RA      PAST MEDICAL & SURGICAL HISTORY:  Prostate cancer  Thyroid cancer  HLD (hyperlipidemia)  HTN (hypertension)  Dementia  S/P prostatectomy: 2004  S/P thyroidectomy: Total in 1980      MEDICATIONS  (STANDING):  aspirin enteric coated 81 milliGRAM(s) Oral daily  atorvastatin 10 milliGRAM(s) Oral at bedtime  heparin  Injectable 5000 Unit(s) SubCutaneous every 8 hours  influenza   Vaccine 0.5 milliLiter(s) IntraMuscular once  lactated ringers. 1000 milliLiter(s) (75 mL/Hr) IV Continuous <Continuous>  levothyroxine 175 MICROGram(s) Oral daily  memantine 10 milliGRAM(s) Oral two times a day    MEDICATIONS  (PRN):      FAMILY HISTORY:  Family history of pancreatic cancer (Child)  No family history of premature coronary artery disease or sudden cardiac death    SOCIAL HISTORY:  Smoking-Non Smoker  Alcohol-Denies  Ilicit Drug use-Denies    REVIEW OF SYSTEMS:  Constitutional: [ ] fever, [ ]weight loss, [ ]fatigue Activity [ ] Bedbound,[ ] Ambulates [ ] Unassisted[ ] Cane/Walker [ ] Assistence.  Eyes: [ ] visual changes  Respiratory: [ ]shortness of breath;  [ ] cough, [ ]wheezing, [ ]chills, [ ]hemoptysis  Cardiovascular: [ ] chest pain, [ ]palpitations, [ ]dizziness,  [ ]leg swelling[ ]orthopnea [ ]PND  Gastrointestinal: [ ] abdominal pain, [ ]nausea, [ ]vomiting,  [ ]diarrhea,[ ]constipation  Genitourinary: [ ] dysuria, [ ] hematuria  Neurologic: [ ] headaches [ ] tremors[ ] weakness  Skin: [ ] itching, [ ]burning, [ ] rashes  Endocrine: [ ] heat or cold intolerance  Musculoskeletal: [ ] joint pain or swelling; [ ] muscle, back, or extremity pain  Psychiatric: [ ] depression, [ ]anxiety, [ ]mood swings, or [ ]difficulty sleeping  Hematologic: [ ] easy bruising, [ ] bleeding gums       [ ] All others negative	  [x ] Unable to obtain    Vital Signs Last 24 Hrs  T(C): 36.6 (27 Nov 2018 04:49), Max: 36.7 (26 Nov 2018 13:53)  T(F): 97.8 (27 Nov 2018 04:49), Max: 98.1 (26 Nov 2018 13:53)  HR: 51 (27 Nov 2018 04:49) (51 - 75)  BP: 150/82 (27 Nov 2018 04:49) (144/62 - 160/75)  RR: 18 (27 Nov 2018 04:49) (17 - 18)  SpO2: 100% (27 Nov 2018 04:49) (96% - 100%)  I&O's Summary    25 Nov 2018 07:01  -  26 Nov 2018 07:00  --------------------------------------------------------  IN: 880 mL / OUT: 1075 mL / NET: -195 mL    26 Nov 2018 07:01  -  27 Nov 2018 06:36  --------------------------------------------------------  IN: 520 mL / OUT: 300 mL / NET: 220 mL        PHYSICAL EXAM:  General: No acute distress BMI-18.9  HEENT: EOMI, PERRL[ ] Icteric  Neck: Supple, No JVD  Lungs: Equal air entry bilaterally; [ ] Rales [ ] Rhonchi [ ] Wheezing  Heart: Regular rate and rhythm;[x ] Murmurs-  2 /6 [x ] Systolic [ ] Diastolic [ ] Radiation,No rubs, or gallops  Abdomen: Nontender, bowel sounds present  Extremities: No clubbing, cyanosis, or edema[ ] Calf tenderness  Nervous system:  Alert & Oriented X1, no focal deficits  Psychiatric: Normal affect  Skin: No rashes or lesions      LABS:  11-27    139  |  101  |  17  ----------------------------<  82  3.5   |  27  |  0.97    Ca    8.2<L>      27 Nov 2018 05:56      Creatinine Trend: 0.97<--, 1.07<--, 1.34<--, 0.75<--                        11.8   5.37  )-----------( 225      ( 25 Nov 2018 08:37 )             36.7     PT/INR - ( 25 Nov 2018 08:29 )   PT: 11.9 sec;   INR: 1.06 ratio    PTT - ( 25 Nov 2018 08:29 )  PTT:31.7 sec      Troponin T, High Sensitivity (11.25.18 @ 06:22)    Troponin T, High Sensitivity Result: 27--->29 ng/L    11-25 [id number] 5.5      RADIOLOGY:-CT BRAIN                  IMPRESSION: No acute intracranial bleeding, mass effect, or shift.       ECG [my interpretation]:Sinus rhythm at 70 bpm LVH with ST T wave abnormalities       ECHO:-Study Date: 11/26/2018   Severe global left ventricular systolic dysfunction.EF- 34 %  Mild mitral regurgitation.  Normal left atrium.   Mild diastolic dysfunction (Stage I).  TTE-in 2017-Normal LV Systolic Function

## 2018-11-27 NOTE — DISCHARGE NOTE ADULT - PLAN OF CARE
stable !psyncope Follow up with your medical doctor to establish long term blood pressure treatment goals. c/w meds as prescribed HOME CARE INSTRUCTIONS  Have someone stay with you until you feel stable.  Do not drive, operate machinery, or play sports until your caregiver says it is okay.  Keep all follow-up appointments as directed by your caregiver.   Lie down right away if you start feeling like you might faint. Breathe deeply and steadily. Wait until all the symptoms have passed.Drink enough fluids to keep your urine clear or pale yellow.  If you are taking blood pressure or heart medicine, get up slowly, taking several minutes to sit and then stand. This can reduce dizziness.  SEEK IMMEDIATE MEDICAL CARE IF:  You have a severe headache.  You have unusual pain in the chest, abdomen, or back.  You are bleeding from the mouth or rectum, or you have black or tarry stool.  You have an irregular or very fast heartbeat.  You have pain with breathing.  You have repeated fainting or seizure-like jerking during an episode.  You faint when sitting or lying down.  You have confusion.  You have difficulty walking.  You have severe weakness.  You have vision problems.  If you fainted, call your local emergency services (_____________________). Do not drive yourself to the hospital c/w conservative treatment   per cardiology

## 2018-11-29 NOTE — ED PROVIDER NOTE - AGGRAVATING FACTORS
64 yo bekah 
**ATTENDING ADDENDUM (Dr. Jimmy Grossman): context: while eating dinner

## 2018-11-30 ENCOUNTER — EMERGENCY (EMERGENCY)
Facility: HOSPITAL | Age: 83
LOS: 0 days | Discharge: HOME | End: 2018-11-30
Attending: EMERGENCY MEDICINE
Payer: MEDICARE

## 2018-11-30 VITALS
HEART RATE: 60 BPM | RESPIRATION RATE: 18 BRPM | SYSTOLIC BLOOD PRESSURE: 137 MMHG | TEMPERATURE: 98 F | OXYGEN SATURATION: 99 % | DIASTOLIC BLOOD PRESSURE: 61 MMHG

## 2018-11-30 VITALS
HEART RATE: 55 BPM | RESPIRATION RATE: 17 BRPM | OXYGEN SATURATION: 100 % | TEMPERATURE: 98 F | DIASTOLIC BLOOD PRESSURE: 68 MMHG | SYSTOLIC BLOOD PRESSURE: 179 MMHG
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
## 2018-11-30 DIAGNOSIS — T59.891A TOXIC EFFECT OF OTHER SPECIFIED GASES, FUMES AND VAPORS, ACCIDENTAL (UNINTENTIONAL), INITIAL ENCOUNTER: ICD-10-CM
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
## 2018-11-30 LAB
ALBUMIN SERPL ELPH-MCNC: 3.6 G/DL — SIGNIFICANT CHANGE UP (ref 3.3–5)
ALP SERPL-CCNC: 40 U/L — SIGNIFICANT CHANGE UP (ref 40–120)
ALT FLD-CCNC: 38 U/L — SIGNIFICANT CHANGE UP (ref 12–78)
ANION GAP SERPL CALC-SCNC: 5 MMOL/L — SIGNIFICANT CHANGE UP (ref 5–17)
AST SERPL-CCNC: 45 U/L — HIGH (ref 15–37)
BASE EXCESS BLDA CALC-SCNC: 5.5 MMOL/L — HIGH (ref -2–2)
BILIRUB SERPL-MCNC: 0.3 MG/DL — SIGNIFICANT CHANGE UP (ref 0.2–1.2)
BLOOD GAS COMMENTS: SIGNIFICANT CHANGE UP
BLOOD GAS SOURCE: SIGNIFICANT CHANGE UP
BUN SERPL-MCNC: 19 MG/DL — SIGNIFICANT CHANGE UP (ref 7–23)
CALCIUM SERPL-MCNC: 7.8 MG/DL — LOW (ref 8.5–10.1)
CHLORIDE SERPL-SCNC: 103 MMOL/L — SIGNIFICANT CHANGE UP (ref 96–108)
CO2 SERPL-SCNC: 33 MMOL/L — HIGH (ref 22–31)
CREAT SERPL-MCNC: 0.89 MG/DL — SIGNIFICANT CHANGE UP (ref 0.5–1.3)
GLUCOSE SERPL-MCNC: 84 MG/DL — SIGNIFICANT CHANGE UP (ref 70–99)
HCO3 BLDA-SCNC: 29 MMOL/L — SIGNIFICANT CHANGE UP (ref 21–29)
HCT VFR BLD CALC: 33.9 % — LOW (ref 39–50)
HGB BLD-MCNC: 10.8 G/DL — LOW (ref 13–17)
HOROWITZ INDEX BLDA+IHG-RTO: 21 — SIGNIFICANT CHANGE UP
LACTATE SERPL-SCNC: 0.8 MMOL/L — SIGNIFICANT CHANGE UP (ref 0.7–2)
MCHC RBC-ENTMCNC: 29.8 PG — SIGNIFICANT CHANGE UP (ref 27–34)
MCHC RBC-ENTMCNC: 31.9 GM/DL — LOW (ref 32–36)
MCV RBC AUTO: 93.4 FL — SIGNIFICANT CHANGE UP (ref 80–100)
NRBC # BLD: 0 /100 WBCS — SIGNIFICANT CHANGE UP (ref 0–0)
PCO2 BLDA: 40 MMHG — SIGNIFICANT CHANGE UP (ref 32–46)
PH BLD: 7.48 — HIGH (ref 7.35–7.45)
PLATELET # BLD AUTO: 207 K/UL — SIGNIFICANT CHANGE UP (ref 150–400)
PO2 BLDA: 100 MMHG — SIGNIFICANT CHANGE UP (ref 74–108)
POTASSIUM SERPL-MCNC: 4.1 MMOL/L — SIGNIFICANT CHANGE UP (ref 3.5–5.3)
POTASSIUM SERPL-SCNC: 4.1 MMOL/L — SIGNIFICANT CHANGE UP (ref 3.5–5.3)
PROT SERPL-MCNC: 6.7 GM/DL — SIGNIFICANT CHANGE UP (ref 6–8.3)
RBC # BLD: 3.63 M/UL — LOW (ref 4.2–5.8)
RBC # FLD: 15.3 % — HIGH (ref 10.3–14.5)
SAO2 % BLDA: 98 % — HIGH (ref 92–96)
SODIUM SERPL-SCNC: 141 MMOL/L — SIGNIFICANT CHANGE UP (ref 135–145)
TROPONIN I SERPL-MCNC: <.015 NG/ML — SIGNIFICANT CHANGE UP (ref 0.01–0.04)
WBC # BLD: 6.24 K/UL — SIGNIFICANT CHANGE UP (ref 3.8–10.5)
WBC # FLD AUTO: 6.24 K/UL — SIGNIFICANT CHANGE UP (ref 3.8–10.5)

## 2018-11-30 PROCEDURE — 71046 X-RAY EXAM CHEST 2 VIEWS: CPT | Mod: 26

## 2018-11-30 PROCEDURE — 99285 EMERGENCY DEPT VISIT HI MDM: CPT
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
## 2018-11-30 NOTE — ED ADULT NURSE NOTE - OBJECTIVE STATEMENT
Pt Alert and confused with hx of dementia. Per family pt drank unknown amount of ammonia this morning, pt thought it was soda. Pt denies chest or abdominal pain at this time in ED. No vomiting noted at this time, safety maintained, family at bedside

## 2018-11-30 NOTE — ED PROVIDER NOTE - OBJECTIVE STATEMENT
85 yo male with dementia presents after found spitting up ammonia this morning. Patient denies fever, chills abdominal pain, chest pain, abdominal pain, fever, chills,

## 2018-12-03 RX ORDER — MEMANTINE HYDROCHLORIDE 10 MG/1
1 TABLET ORAL
Qty: 0 | Refills: 0 | COMMUNITY

## 2018-12-03 RX ORDER — CHOLECALCIFEROL (VITAMIN D3) 125 MCG
1 CAPSULE ORAL
Qty: 0 | Refills: 0 | COMMUNITY

## 2018-12-03 RX ORDER — AMLODIPINE BESYLATE 2.5 MG/1
1 TABLET ORAL
Qty: 0 | Refills: 0 | COMMUNITY

## 2018-12-03 RX ORDER — LEVOTHYROXINE SODIUM 125 MCG
1 TABLET ORAL
Qty: 0 | Refills: 0 | COMMUNITY

## 2019-07-21 ENCOUNTER — EMERGENCY (EMERGENCY)
Facility: HOSPITAL | Age: 84
LOS: 1 days | Discharge: HOME | End: 2019-07-21
Attending: EMERGENCY MEDICINE | Admitting: EMERGENCY MEDICINE
Payer: MEDICARE

## 2019-07-21 VITALS
HEART RATE: 58 BPM | DIASTOLIC BLOOD PRESSURE: 57 MMHG | RESPIRATION RATE: 17 BRPM | TEMPERATURE: 98 F | SYSTOLIC BLOOD PRESSURE: 170 MMHG | OXYGEN SATURATION: 99 %

## 2019-07-21 PROCEDURE — 99282 EMERGENCY DEPT VISIT SF MDM: CPT

## 2019-07-21 NOTE — ED ADULT TRIAGE NOTE - CHIEF COMPLAINT QUOTE
Pt c/o eye infection starting acutely around 9pm tonight after dinner. Pt eyes noted to be red swollen and draining. PMH Dementia, Alzheimer, and HTN

## 2019-07-22 ENCOUNTER — EMERGENCY (EMERGENCY)
Facility: HOSPITAL | Age: 84
LOS: 1 days | Discharge: HOME | End: 2019-07-22
Attending: EMERGENCY MEDICINE | Admitting: EMERGENCY MEDICINE
Payer: COMMERCIAL

## 2019-07-22 VITALS
HEART RATE: 51 BPM | DIASTOLIC BLOOD PRESSURE: 87 MMHG | RESPIRATION RATE: 20 BRPM | SYSTOLIC BLOOD PRESSURE: 198 MMHG | OXYGEN SATURATION: 100 % | TEMPERATURE: 98 F

## 2019-07-22 VITALS
SYSTOLIC BLOOD PRESSURE: 165 MMHG | HEART RATE: 58 BPM | OXYGEN SATURATION: 100 % | RESPIRATION RATE: 18 BRPM | TEMPERATURE: 98 F | DIASTOLIC BLOOD PRESSURE: 60 MMHG

## 2019-07-22 VITALS
RESPIRATION RATE: 16 BRPM | HEART RATE: 56 BPM | SYSTOLIC BLOOD PRESSURE: 184 MMHG | DIASTOLIC BLOOD PRESSURE: 65 MMHG | OXYGEN SATURATION: 100 % | TEMPERATURE: 98 F

## 2019-07-22 PROCEDURE — 99284 EMERGENCY DEPT VISIT MOD MDM: CPT

## 2019-07-22 PROCEDURE — 70450 CT HEAD/BRAIN W/O DYE: CPT | Mod: 26

## 2019-07-22 PROCEDURE — 99053 MED SERV 10PM-8AM 24 HR FAC: CPT

## 2019-07-22 PROCEDURE — 72125 CT NECK SPINE W/O DYE: CPT | Mod: 26

## 2019-07-22 RX ORDER — POLYMYXIN B SULF/TRIMETHOPRIM 10000-1/ML
1 DROPS OPHTHALMIC (EYE) ONCE
Refills: 0 | Status: COMPLETED | OUTPATIENT
Start: 2019-07-22 | End: 2019-07-22

## 2019-07-22 RX ADMIN — Medication 1 DROP(S): at 02:06
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
## 2019-07-22 NOTE — ED PROVIDER NOTE - PHYSICAL EXAMINATION
Gen:  Well appearing in NAD  Head:  NC/AT  Eyes; B periorbital eye swelling with erythema and discharge - EOMI, PERRL  Resp: No distress   Ext: no deformities  Skin: warm and dry as visualized

## 2019-07-22 NOTE — ED ADULT NURSE NOTE - OBJECTIVE STATEMENT
86 y/o with 5 hours of R eye redness and itching with white/yellow discharge.  No change in vision and no pain reported.  Meds as ordered
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
## 2019-07-22 NOTE — ED PROVIDER NOTE - NSFOLLOWUPINSTRUCTIONS_ED_ALL_ED_FT
Follow up with your Primary Medical Doctor within 2-3days. If results or reports were given to you, show copies of your reports given to you. Take all of your medications as previously prescribed.  If you have issues obtaining follow up, please call: [phone] to obtain a doctor or specialist who takes your insurance in your area.      Any worsening pain, weakness, numbness. return to ER
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
## 2019-07-22 NOTE — ED PROVIDER NOTE - NSFOLLOWUPINSTRUCTIONS_ED_ALL_ED_FT
Conjunctivitis    Conjunctivitis is an inflammation of the clear membrane that covers the white part of your eye and the inner surface of your eyelid (conjunctiva). Symptoms include eye redness, eye pain, tearing and drainage, crusting of eyelids, swollen eyelids, and light sensitivity. Conjunctivitis may be contagious and easily spread from person to person. It can be caused by a virus, bacteria, or as part of an allergic reaction; the treatment depends on the type of conjunctivitis suspected. Avoid touching or rubbing your eyes and wipe away any drainage gently with a warm wet washcloth. Do not wear contact lenses until the inflammation is gone – wear glasses until your health care provider says it is safe to wear contact again. Do not share towels or washcloths that may spread the infection and wash your hands frequently.    SEEK IMMEDIATE MEDICAL CARE IF YOU HAVE ANY OF THE FOLLOWING SYMPTOMS: increasing pain, blurry vision, blindness, fever, or facial pain/redness/swelling.     USE DROPS YOU WERE GIVEN 4 TIME A DAY FOR THE NEXT WEEK

## 2019-07-22 NOTE — ED PROVIDER NOTE - NS ED ROS FT
Hx Dementia but denies:  CONSTITUTIONAL: No fevers, no chills, no lightheadedness, no dizziness  Eyes: no visual changes  Ears: no ear drainage, no ear pain  Nose: no nasal congestion  Mouth/Throat: no sore throat  CV: No chest pain, no palpitations  PULM: No SOB, no cough  GI: No n/v/d, no abd pain  : no dysuria, no hematuria  SKIN: no rashes.  NEURO: no headache, no focal weakness or numbness  PSYCHIATRIC: no known mental health issues.

## 2019-07-22 NOTE — ED PROVIDER NOTE - PHYSICAL EXAMINATION
Gen: Well appearing, NAD  Head: NCAT  HEENT: PERRL, MMM, normal conjunctiva, anicteric, neck supple  Lung: CTAB, no adventitious sounds  CV: RRR, no murmurs  Abd: soft, NTND, no rebound or guarding, no CVAT  MSK: No edema, no visible deformities, no midline ttp  Neuro: CN II-XII grossly intact. 5/5 strength and normal sensation in all extremities  Skin: Warm and dry, no evidence of rash  Psych: normal mood and affect
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
## 2019-07-22 NOTE — ED PROVIDER NOTE - CLINICAL SUMMARY MEDICAL DECISION MAKING FREE TEXT BOX
pt with likely B conjunctivitis.  Viral v bacterial.  based on dementia and exposures will treat with ABx drops.  Acuity intact.  No signs of orbital or periorbital at this time

## 2020-05-27 NOTE — ED PROVIDER NOTE - NS ED MD DISPO DISCHARGE
Continue warm compresses  May take Ibuprofen along with Cyclobenzaprine - recommended 600 mg every 6-8 hours  Follow up as needed if symptoms persist, consider PT    
Home

## 2021-01-26 NOTE — DISCHARGE NOTE ADULT - MEDICATION SUMMARY - MEDICATIONS TO CHANGE
I will SWITCH the dose or number of times a day I take the medications listed below when I get home from the hospital:  None
Generalized fatigue

## 2021-02-02 NOTE — ED PROVIDER NOTE - PENDING LAB RAD OPT OUT
English Exclude Pending Lab and Radiology orders from printing on the Patient's Discharge Instructions, due to Privacy Concerns.

## 2021-10-18 NOTE — ED ADULT TRIAGE NOTE - ESI TRIAGE ACUITY LEVEL, MLM
3 89 y/o F with a PMH b/l LE edema, HTN, P Afib on AC , hypothyroidism, who presents with 1mo hx of worsening b/l LE edema.     Pt A+Ox3-4 during visit; seen secondary to LOS > 7days. Tolerating Dash/TLC diet, 1L po FR well; intake % in EMR. Pt reports only fair appetite. States gradual weight loss ~35lbs after death of spouse in may. Per pt lives alone. Consumes ~2 meals per day and a snack in the evening. Typical 24hrs diet recall as follows: breakfast- waffles  lunch- frozen TV dinner or grilled cheese sandwich, snack - apples/grapes. Pt states she does not add salt to foods however diet recall contains many high sodium food items. Minimal protein intake pta, rarely eats chicken, fish, eggs, red meats; majority of protein consumption coming rom dairy/cheese/yogurt. Declined protein supplement. Encourage consumption of HBV protein sources with all meals/snacks. Diet education reinforced on DashTLC diet and po fluid restriction.

## 2021-12-16 NOTE — ED ADULT TRIAGE NOTE - ACCOMPANIED BY
Patient attended Phase 2 Cardiac Rehab Exercise Session. Further documentation will be scanned into the medical record upon discharge.  
Immediate family member
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
## 2024-03-28 NOTE — PATIENT PROFILE ADULT - HAVE YOU EXPERIENCED VIOLENCE OR A TRAUMATIC EVENT?
Psychotherapy Discharge Summary    Preferred Name: Celine Roa  YOB: 1973    Admission date to psychotherapy: 12/6/23    Referred by: hospital    Presenting Problem: Hx of severe medical issues.    Course of treatment included : individual therapy     Progress/Outcome of Treatment Goals (brief summary of course of treatment) Celine was only seen for 3 sessions before she cancelled multiple appts.  Last seen was 1/26/23.  She called on 2/2/24 requesting an in person therapist in her area.  The plan was to continue follow up per phone call, virtually until a provider was found but she cx pending appts one at a time with last appt she cx being today.    Treatment Complications (if any):  Medical issues and Celine wants to do in person visits.  This writer is virtual only.    Treatment Progress: poor    Current [facility] Psychiatric Provider: None    Discharge Medications include: phychiatric meds:  Ativan and Remeron    Discharge Date: 3/28/24    Discharge Diagnosis:   1. 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    Aftercare recommendations include (include specific referral names and phone numbers, if appropriate): follow with [facility] for an in person therapist.    Prognosis: poor     no

## 2024-05-10 NOTE — DISCHARGE NOTE ADULT - LAUNCH MEDICATION RECONCILIATION
[Inflamed Nasal Mucosa] : inflamed nasal mucosa [Erythematous Oropharynx] : erythematous oropharynx [Cobblestoning] : cobblestoning of posterior pharynx [NL] : warm, clear <<-----Click here for Discharge Medication Review

## 2024-06-05 ENCOUNTER — APPOINTMENT (OUTPATIENT)
Dept: VASCULAR SURGERY | Facility: CLINIC | Age: 89
End: 2024-06-05
Payer: MEDICARE

## 2024-06-05 VITALS
HEART RATE: 37 BPM | HEIGHT: 72 IN | SYSTOLIC BLOOD PRESSURE: 144 MMHG | DIASTOLIC BLOOD PRESSURE: 69 MMHG | WEIGHT: 200 LBS | BODY MASS INDEX: 27.09 KG/M2 | TEMPERATURE: 97.9 F

## 2024-06-05 DIAGNOSIS — Z82.0 FAMILY HISTORY OF EPILEPSY AND OTHER DISEASES OF THE NERVOUS SYSTEM: ICD-10-CM

## 2024-06-05 DIAGNOSIS — Z87.39 PERSONAL HISTORY OF OTHER DISEASES OF THE MUSCULOSKELETAL SYSTEM AND CONNECTIVE TISSUE: ICD-10-CM

## 2024-06-05 DIAGNOSIS — Z86.39 PERSONAL HISTORY OF OTHER ENDOCRINE, NUTRITIONAL AND METABOLIC DISEASE: ICD-10-CM

## 2024-06-05 DIAGNOSIS — Z86.79 PERSONAL HISTORY OF OTHER DISEASES OF THE CIRCULATORY SYSTEM: ICD-10-CM

## 2024-06-05 DIAGNOSIS — Z78.9 OTHER SPECIFIED HEALTH STATUS: ICD-10-CM

## 2024-06-05 DIAGNOSIS — Z82.49 FAMILY HISTORY OF ISCHEMIC HEART DISEASE AND OTHER DISEASES OF THE CIRCULATORY SYSTEM: ICD-10-CM

## 2024-06-05 PROCEDURE — 99203 OFFICE O/P NEW LOW 30 MIN: CPT

## 2024-06-05 PROCEDURE — 93922 UPR/L XTREMITY ART 2 LEVELS: CPT

## 2024-06-05 NOTE — HISTORY OF PRESENT ILLNESS
[FreeTextEntry1] : Patient is a 89 year old male with history of Alzheimer's who presents to the office for evaluation of left leg wound. Patients' family states that he has had the wound above his left ankle for 2 months and has been slowly healing. Family states it was draining in the beginning but now has been dry. He requires assistance to get out of bed and ambulate. No claudication or rest pain.

## 2024-06-05 NOTE — PHYSICAL EXAM
[1+] : left 1+ [0] : left 0 [Ankle Swelling Bilaterally] : bilaterally  [Ankle Swelling (On Exam)] : not present [de-identified] : NAD [de-identified] : left lower extremity ankle wound, dry scab, no drainage no erythema

## 2024-06-05 NOTE — ASSESSMENT
[FreeTextEntry1] : L leg wound that is healed by now with overlying scab no signs of infection  pt is minimally ambulatory and has dementia PVRs shows nl flows down to the ankles, diminished flow bellow ankles c/w small vessel dz pt is not a good candidate for any interventions due to deconditioning, minimal ambulation and dementia cont medical management w. ASA   pt is somnolent which is a change as per family, I recommended them to got to ED for further eval of AMS

## 2024-10-13 NOTE — ED ADULT NURSE NOTE - PRIMARY CARE PROVIDER
Stephanie Villela MD
Stephanie Pedroza - Tika DE LA TORRE
Yany Hobbs MD
MD Stephanie Pedroza
Segundo Matson
Stephanie Leonana
Stephanie Pedroza - Tika DE LA TORRE
Segundo Boo- Resident
MD Stephanie Pedroaz
unk

## 2024-11-04 ENCOUNTER — TRANSCRIPTION ENCOUNTER (OUTPATIENT)
Age: 89
End: 2024-11-04

## 2024-11-04 ENCOUNTER — RESULT REVIEW (OUTPATIENT)
Age: 89
End: 2024-11-04

## 2024-11-04 ENCOUNTER — INPATIENT (INPATIENT)
Facility: HOSPITAL | Age: 89
LOS: 0 days | Discharge: HOME | DRG: 291 | End: 2024-11-04
Attending: STUDENT IN AN ORGANIZED HEALTH CARE EDUCATION/TRAINING PROGRAM | Admitting: STUDENT IN AN ORGANIZED HEALTH CARE EDUCATION/TRAINING PROGRAM
Payer: COMMERCIAL

## 2024-11-04 VITALS
OXYGEN SATURATION: 94 % | RESPIRATION RATE: 18 BRPM | SYSTOLIC BLOOD PRESSURE: 164 MMHG | DIASTOLIC BLOOD PRESSURE: 89 MMHG | TEMPERATURE: 98 F | HEART RATE: 73 BPM

## 2024-11-04 VITALS
SYSTOLIC BLOOD PRESSURE: 149 MMHG | HEIGHT: 72 IN | OXYGEN SATURATION: 98 % | DIASTOLIC BLOOD PRESSURE: 86 MMHG | RESPIRATION RATE: 18 BRPM | WEIGHT: 199.96 LBS | HEART RATE: 76 BPM

## 2024-11-04 DIAGNOSIS — I10 ESSENTIAL (PRIMARY) HYPERTENSION: ICD-10-CM

## 2024-11-04 DIAGNOSIS — J96.01 ACUTE RESPIRATORY FAILURE WITH HYPOXIA: ICD-10-CM

## 2024-11-04 DIAGNOSIS — R79.89 OTHER SPECIFIED ABNORMAL FINDINGS OF BLOOD CHEMISTRY: ICD-10-CM

## 2024-11-04 DIAGNOSIS — R06.02 SHORTNESS OF BREATH: ICD-10-CM

## 2024-11-04 DIAGNOSIS — E03.9 HYPOTHYROIDISM, UNSPECIFIED: ICD-10-CM

## 2024-11-04 DIAGNOSIS — R94.31 ABNORMAL ELECTROCARDIOGRAM [ECG] [EKG]: ICD-10-CM

## 2024-11-04 DIAGNOSIS — E78.5 HYPERLIPIDEMIA, UNSPECIFIED: ICD-10-CM

## 2024-11-04 DIAGNOSIS — R53.81 OTHER MALAISE: ICD-10-CM

## 2024-11-04 LAB
ADD ON TEST-SPECIMEN IN LAB: SIGNIFICANT CHANGE UP
ALBUMIN SERPL ELPH-MCNC: 3.5 G/DL — SIGNIFICANT CHANGE UP (ref 3.3–5)
ALP SERPL-CCNC: 78 U/L — SIGNIFICANT CHANGE UP (ref 40–120)
ALT FLD-CCNC: 31 U/L — SIGNIFICANT CHANGE UP (ref 10–45)
ANION GAP SERPL CALC-SCNC: 10 MMOL/L — SIGNIFICANT CHANGE UP (ref 5–17)
ANION GAP SERPL CALC-SCNC: 11 MMOL/L — SIGNIFICANT CHANGE UP (ref 5–17)
ANION GAP SERPL CALC-SCNC: 13 MMOL/L — SIGNIFICANT CHANGE UP (ref 5–17)
AST SERPL-CCNC: 32 U/L — SIGNIFICANT CHANGE UP (ref 10–40)
BASOPHILS # BLD AUTO: 0.02 K/UL — SIGNIFICANT CHANGE UP (ref 0–0.2)
BASOPHILS NFR BLD AUTO: 0.2 % — SIGNIFICANT CHANGE UP (ref 0–2)
BILIRUB SERPL-MCNC: 0.2 MG/DL — SIGNIFICANT CHANGE UP (ref 0.2–1.2)
BUN SERPL-MCNC: 16 MG/DL — SIGNIFICANT CHANGE UP (ref 7–23)
BUN SERPL-MCNC: 19 MG/DL — SIGNIFICANT CHANGE UP (ref 7–23)
BUN SERPL-MCNC: 21 MG/DL — SIGNIFICANT CHANGE UP (ref 7–23)
CALCIUM SERPL-MCNC: 5.7 MG/DL — CRITICAL LOW (ref 8.4–10.5)
CALCIUM SERPL-MCNC: 8.1 MG/DL — LOW (ref 8.4–10.5)
CALCIUM SERPL-MCNC: 8.6 MG/DL — SIGNIFICANT CHANGE UP (ref 8.4–10.5)
CHLORIDE SERPL-SCNC: 108 MMOL/L — SIGNIFICANT CHANGE UP (ref 96–108)
CHLORIDE SERPL-SCNC: 111 MMOL/L — HIGH (ref 96–108)
CHLORIDE SERPL-SCNC: 118 MMOL/L — HIGH (ref 96–108)
CO2 SERPL-SCNC: 16 MMOL/L — LOW (ref 22–31)
CO2 SERPL-SCNC: 22 MMOL/L — SIGNIFICANT CHANGE UP (ref 22–31)
CO2 SERPL-SCNC: 24 MMOL/L — SIGNIFICANT CHANGE UP (ref 22–31)
CREAT SERPL-MCNC: 0.68 MG/DL — SIGNIFICANT CHANGE UP (ref 0.5–1.3)
CREAT SERPL-MCNC: 0.96 MG/DL — SIGNIFICANT CHANGE UP (ref 0.5–1.3)
CREAT SERPL-MCNC: 0.97 MG/DL — SIGNIFICANT CHANGE UP (ref 0.5–1.3)
EGFR: 74 ML/MIN/1.73M2 — SIGNIFICANT CHANGE UP
EGFR: 75 ML/MIN/1.73M2 — SIGNIFICANT CHANGE UP
EGFR: 88 ML/MIN/1.73M2 — SIGNIFICANT CHANGE UP
EOSINOPHIL # BLD AUTO: 0.17 K/UL — SIGNIFICANT CHANGE UP (ref 0–0.5)
EOSINOPHIL NFR BLD AUTO: 2 % — SIGNIFICANT CHANGE UP (ref 0–6)
GLUCOSE SERPL-MCNC: 108 MG/DL — HIGH (ref 70–99)
GLUCOSE SERPL-MCNC: 76 MG/DL — SIGNIFICANT CHANGE UP (ref 70–99)
GLUCOSE SERPL-MCNC: 99 MG/DL — SIGNIFICANT CHANGE UP (ref 70–99)
HCT VFR BLD CALC: 15.8 % — CRITICAL LOW (ref 39–50)
HCT VFR BLD CALC: 35.6 % — LOW (ref 39–50)
HCT VFR BLD CALC: 38.2 % — LOW (ref 39–50)
HGB BLD-MCNC: 11.5 G/DL — LOW (ref 13–17)
HGB BLD-MCNC: 11.9 G/DL — LOW (ref 13–17)
HGB BLD-MCNC: 4.7 G/DL — CRITICAL LOW (ref 13–17)
IMM GRANULOCYTES NFR BLD AUTO: 0.4 % — SIGNIFICANT CHANGE UP (ref 0–0.9)
LYMPHOCYTES # BLD AUTO: 0.92 K/UL — LOW (ref 1–3.3)
LYMPHOCYTES # BLD AUTO: 10.9 % — LOW (ref 13–44)
MAGNESIUM SERPL-MCNC: 1.6 MG/DL — SIGNIFICANT CHANGE UP (ref 1.6–2.6)
MAGNESIUM SERPL-MCNC: 2.2 MG/DL — SIGNIFICANT CHANGE UP (ref 1.6–2.6)
MAGNESIUM SERPL-MCNC: 2.2 MG/DL — SIGNIFICANT CHANGE UP (ref 1.6–2.6)
MCHC RBC-ENTMCNC: 28.3 PG — SIGNIFICANT CHANGE UP (ref 27–34)
MCHC RBC-ENTMCNC: 29.5 PG — SIGNIFICANT CHANGE UP (ref 27–34)
MCHC RBC-ENTMCNC: 29.6 PG — SIGNIFICANT CHANGE UP (ref 27–34)
MCHC RBC-ENTMCNC: 29.7 G/DL — LOW (ref 32–36)
MCHC RBC-ENTMCNC: 31.2 G/DL — LOW (ref 32–36)
MCHC RBC-ENTMCNC: 32.3 G/DL — SIGNIFICANT CHANGE UP (ref 32–36)
MCV RBC AUTO: 91 FL — SIGNIFICANT CHANGE UP (ref 80–100)
MCV RBC AUTO: 91.3 FL — SIGNIFICANT CHANGE UP (ref 80–100)
MCV RBC AUTO: 99.4 FL — SIGNIFICANT CHANGE UP (ref 80–100)
MONOCYTES # BLD AUTO: 0.68 K/UL — SIGNIFICANT CHANGE UP (ref 0–0.9)
MONOCYTES NFR BLD AUTO: 8.1 % — SIGNIFICANT CHANGE UP (ref 2–14)
NEUTROPHILS # BLD AUTO: 6.62 K/UL — SIGNIFICANT CHANGE UP (ref 1.8–7.4)
NEUTROPHILS NFR BLD AUTO: 78.4 % — HIGH (ref 43–77)
NRBC # BLD: 0 /100 WBCS — SIGNIFICANT CHANGE UP (ref 0–0)
NT-PROBNP SERPL-SCNC: 2890 PG/ML — HIGH (ref 0–300)
PHOSPHATE SERPL-MCNC: 3.2 MG/DL — SIGNIFICANT CHANGE UP (ref 2.5–4.5)
PLATELET # BLD AUTO: 105 K/UL — LOW (ref 150–400)
PLATELET # BLD AUTO: 241 K/UL — SIGNIFICANT CHANGE UP (ref 150–400)
PLATELET # BLD AUTO: 241 K/UL — SIGNIFICANT CHANGE UP (ref 150–400)
POTASSIUM SERPL-MCNC: 3.1 MMOL/L — LOW (ref 3.5–5.3)
POTASSIUM SERPL-MCNC: 3.4 MMOL/L — LOW (ref 3.5–5.3)
POTASSIUM SERPL-MCNC: 4 MMOL/L — SIGNIFICANT CHANGE UP (ref 3.5–5.3)
POTASSIUM SERPL-SCNC: 3.1 MMOL/L — LOW (ref 3.5–5.3)
POTASSIUM SERPL-SCNC: 3.4 MMOL/L — LOW (ref 3.5–5.3)
POTASSIUM SERPL-SCNC: 4 MMOL/L — SIGNIFICANT CHANGE UP (ref 3.5–5.3)
PROT SERPL-MCNC: 6.4 G/DL — SIGNIFICANT CHANGE UP (ref 6–8.3)
RBC # BLD: 1.59 M/UL — LOW (ref 4.2–5.8)
RBC # BLD: 3.9 M/UL — LOW (ref 4.2–5.8)
RBC # BLD: 4.2 M/UL — SIGNIFICANT CHANGE UP (ref 4.2–5.8)
RBC # FLD: 15.8 % — HIGH (ref 10.3–14.5)
RBC # FLD: 15.9 % — HIGH (ref 10.3–14.5)
RBC # FLD: 16 % — HIGH (ref 10.3–14.5)
SODIUM SERPL-SCNC: 144 MMOL/L — SIGNIFICANT CHANGE UP (ref 135–145)
SODIUM SERPL-SCNC: 144 MMOL/L — SIGNIFICANT CHANGE UP (ref 135–145)
SODIUM SERPL-SCNC: 145 MMOL/L — SIGNIFICANT CHANGE UP (ref 135–145)
T4 FREE SERPL-MCNC: 2 NG/DL — HIGH (ref 0.9–1.8)
TROPONIN T, HIGH SENSITIVITY RESULT: 65 NG/L — HIGH (ref 0–51)
TROPONIN T, HIGH SENSITIVITY RESULT: 87 NG/L — HIGH (ref 0–51)
TROPONIN T, HIGH SENSITIVITY RESULT: 89 NG/L — HIGH (ref 0–51)
TSH SERPL-MCNC: 11.6 UIU/ML — HIGH (ref 0.27–4.2)
TSH SERPL-MCNC: 9.48 UIU/ML — HIGH (ref 0.27–4.2)
WBC # BLD: 4.12 K/UL — SIGNIFICANT CHANGE UP (ref 3.8–10.5)
WBC # BLD: 8.44 K/UL — SIGNIFICANT CHANGE UP (ref 3.8–10.5)
WBC # BLD: 9.37 K/UL — SIGNIFICANT CHANGE UP (ref 3.8–10.5)
WBC # FLD AUTO: 4.12 K/UL — SIGNIFICANT CHANGE UP (ref 3.8–10.5)
WBC # FLD AUTO: 8.44 K/UL — SIGNIFICANT CHANGE UP (ref 3.8–10.5)
WBC # FLD AUTO: 9.37 K/UL — SIGNIFICANT CHANGE UP (ref 3.8–10.5)

## 2024-11-04 PROCEDURE — 83880 ASSAY OF NATRIURETIC PEPTIDE: CPT

## 2024-11-04 PROCEDURE — 80053 COMPREHEN METABOLIC PANEL: CPT

## 2024-11-04 PROCEDURE — 84443 ASSAY THYROID STIM HORMONE: CPT

## 2024-11-04 PROCEDURE — 99285 EMERGENCY DEPT VISIT HI MDM: CPT | Mod: 25

## 2024-11-04 PROCEDURE — 83735 ASSAY OF MAGNESIUM: CPT

## 2024-11-04 PROCEDURE — 93005 ELECTROCARDIOGRAM TRACING: CPT

## 2024-11-04 PROCEDURE — 99223 1ST HOSP IP/OBS HIGH 75: CPT

## 2024-11-04 PROCEDURE — 99285 EMERGENCY DEPT VISIT HI MDM: CPT | Mod: GC

## 2024-11-04 PROCEDURE — 80048 BASIC METABOLIC PNL TOTAL CA: CPT

## 2024-11-04 PROCEDURE — 84100 ASSAY OF PHOSPHORUS: CPT

## 2024-11-04 PROCEDURE — C8929: CPT

## 2024-11-04 PROCEDURE — 93306 TTE W/DOPPLER COMPLETE: CPT | Mod: 26

## 2024-11-04 PROCEDURE — 71045 X-RAY EXAM CHEST 1 VIEW: CPT | Mod: 26

## 2024-11-04 PROCEDURE — 36415 COLL VENOUS BLD VENIPUNCTURE: CPT

## 2024-11-04 PROCEDURE — 84484 ASSAY OF TROPONIN QUANT: CPT

## 2024-11-04 PROCEDURE — 85027 COMPLETE CBC AUTOMATED: CPT

## 2024-11-04 PROCEDURE — 71045 X-RAY EXAM CHEST 1 VIEW: CPT

## 2024-11-04 PROCEDURE — 85025 COMPLETE CBC W/AUTO DIFF WBC: CPT

## 2024-11-04 PROCEDURE — 84439 ASSAY OF FREE THYROXINE: CPT

## 2024-11-04 RX ORDER — FUROSEMIDE 40 MG
20 TABLET ORAL ONCE
Refills: 0 | Status: COMPLETED | OUTPATIENT
Start: 2024-11-04 | End: 2024-11-04

## 2024-11-04 RX ORDER — CHOLECALCIFEROL (VITAMIN D3) 625 MCG
1 CAPSULE ORAL
Refills: 0 | DISCHARGE

## 2024-11-04 RX ORDER — CARVEDILOL 25 MG/1
1 TABLET, FILM COATED ORAL
Refills: 0 | DISCHARGE

## 2024-11-04 RX ORDER — GABAPENTIN 300 MG/1
100 CAPSULE ORAL THREE TIMES A DAY
Refills: 0 | Status: DISCONTINUED | OUTPATIENT
Start: 2024-11-04 | End: 2024-11-04

## 2024-11-04 RX ORDER — MELATONIN 5 MG
3 TABLET ORAL AT BEDTIME
Refills: 0 | Status: DISCONTINUED | OUTPATIENT
Start: 2024-11-04 | End: 2024-11-04

## 2024-11-04 RX ORDER — CARVEDILOL 25 MG/1
12.5 TABLET, FILM COATED ORAL EVERY 12 HOURS
Refills: 0 | Status: DISCONTINUED | OUTPATIENT
Start: 2024-11-04 | End: 2024-11-04

## 2024-11-04 RX ORDER — LEVOTHYROXINE SODIUM 88 MCG
112 TABLET ORAL DAILY
Refills: 0 | Status: DISCONTINUED | OUTPATIENT
Start: 2024-11-04 | End: 2024-11-04

## 2024-11-04 RX ORDER — MEMANTINE HYDROCHLORIDE 21 MG/1
1 CAPSULE, EXTENDED RELEASE ORAL
Qty: 60 | Refills: 0
Start: 2024-11-04 | End: 2024-12-03

## 2024-11-04 RX ORDER — ENALAPRIL MALEATE 10 MG
20 TABLET ORAL DAILY
Refills: 0 | Status: DISCONTINUED | OUTPATIENT
Start: 2024-11-04 | End: 2024-11-04

## 2024-11-04 RX ORDER — LEVOTHYROXINE SODIUM 88 MCG
1 TABLET ORAL
Refills: 0 | DISCHARGE

## 2024-11-04 RX ORDER — ACETAMINOPHEN 500 MG
650 TABLET ORAL EVERY 6 HOURS
Refills: 0 | Status: DISCONTINUED | OUTPATIENT
Start: 2024-11-04 | End: 2024-11-04

## 2024-11-04 RX ORDER — MEMANTINE HYDROCHLORIDE 21 MG/1
0 CAPSULE, EXTENDED RELEASE ORAL
Refills: 0 | DISCHARGE

## 2024-11-04 RX ORDER — ENALAPRIL MALEATE 10 MG
1 TABLET ORAL
Refills: 0 | DISCHARGE

## 2024-11-04 RX ORDER — FUROSEMIDE 40 MG
1 TABLET ORAL
Refills: 0 | DISCHARGE

## 2024-11-04 RX ORDER — ASPIRIN/MAG CARB/ALUMINUM AMIN 325 MG
81 TABLET ORAL DAILY
Refills: 0 | Status: DISCONTINUED | OUTPATIENT
Start: 2024-11-04 | End: 2024-11-04

## 2024-11-04 RX ORDER — GABAPENTIN 300 MG/1
1 CAPSULE ORAL
Refills: 0 | DISCHARGE

## 2024-11-04 RX ADMIN — GABAPENTIN 100 MILLIGRAM(S): 300 CAPSULE ORAL at 14:29

## 2024-11-04 RX ADMIN — Medication 20 MILLIGRAM(S): at 08:40

## 2024-11-04 RX ADMIN — Medication 81 MILLIGRAM(S): at 14:29

## 2024-11-04 RX ADMIN — Medication 20 MILLIGRAM(S): at 14:29

## 2024-11-04 RX ADMIN — Medication 20 MILLIGRAM(S): at 04:23

## 2024-11-04 NOTE — GOALS OF CARE CONVERSATION - ADVANCED CARE PLANNING - CONVERSATION DETAILS
I discussed with the patient's daughter (Larisa) who was identified as the patient's HCP. Larisa did not wish to pursue further work up with S&S evaluation as the patient does not usually exhibit signs/symptoms of difficulty swallowing but she would be interested in pursuing further cardiac intervention if it was deemed necessary pending TTE result. Furthermore, she would want the patient to be fully resuscitated if his heart stops and/or he stops breathing on his own. Face to face encounter 16 min. I discussed with the patient's daughter (Larisa) who was identified as the patient's HCP. Larisa did not wish to pursue further work up with S&S evaluation as the patient does not usually exhibit signs/symptoms of difficulty swallowing but she would be interested in pursuing further cardiac intervention if it was deemed necessary pending TTE result though it is unlikely going to provide added benefit for this patient with advanced dementia. Furthermore, she would want the patient to be fully resuscitated if his heart stops and/or he stops breathing on his own. Face to face encounter 16 min. I discussed with the patient's daughter (Larisa) who was identified as the patient's HCP. Larisa did not wish to pursue further work up with S&S evaluation as the patient does not usually exhibit signs/symptoms of difficulty swallowing but she would be interested in pursuing further cardiac intervention if it was deemed necessary pending TTE result though it is unlikely going to provide added benefit for this patient with advanced dementia. Furthermore, she would want the patient to be fully resuscitated if his heart stops and/or he stops breathing on his own. I've asked Palliative care attending, Dr. Renita Frost, to evaluate the patient.  Face to face encounter 16 min.

## 2024-11-04 NOTE — ED PROVIDER NOTE - PHYSICAL EXAMINATION
GEN: Patient awake and alert. No acute distress, non-toxic. Well appearing.   Head: Normocephalic, atraumatic.  Neck: Nontender, full ROM.   Eyes: PERRLA b/l. EOMI, no scleral icterus, no conjunctival injection. Moist mucous membranes.  CARDIAC: RRR. Normal S1, S2. No murmur, rubs, or gallops. No peripheral edema noted.  PULM: CTA B/L no wheeze, rales or rhonchi. No signs of respiratory distress, no accessory muscle usage or nasal flaring.  ABD: Soft, nontender, nondistended. No rebound, no involuntary guarding. ity.  NEURO: A&Ox1 at baseline, no apparent focal neurological deficits  SKIN: Warm, dry, no rash, no lesions, no open wounds. No urticaria. No jaundice.
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
## 2024-11-04 NOTE — ED PROVIDER NOTE - NSICDXPASTMEDICALHX_GEN_ALL_CORE_FT
PAST MEDICAL HISTORY:  Dementia     HLD (hyperlipidemia)     HTN (hypertension)     Prostate cancer     Thyroid cancer

## 2024-11-04 NOTE — CONSULT NOTE ADULT - SUBJECTIVE AND OBJECTIVE BOX
DATE OF SERVICE: 11-04-24      CHIEF COMPLAINT:Patient is a 89y old  Male who presents with a chief complaint of Acute hypoxic respiratory failure (04 Nov 2024 14:16)      HISTORY OF PRESENT ILLNESS:HPI:  89M w/Hx of Alzheimer's, Thyroid cancer s/p thyroidectomy, prostate cancer s/p prostatectomy, HFrEF, HTN, HLD presents due to hypoxia and SOB after possible episode of aspiration during brushing teeth. After this episode, patient continued to endorse SOB and EMS was called who noticed his oxygen was about 92% on room air. He was started on 3L NC. On my exam, he is is still endorsing SOB despite receiving 20mg IV Lasix. He denies chest pain.     Patient is alert but not oriented, unclear if this is his baseline. Poor historian Additional hx obtained via chart review and wife and daughter at bedside. (04 Nov 2024 06:23)      PAST MEDICAL & SURGICAL HISTORY:  Dementia      HTN (hypertension)      HLD (hyperlipidemia)      Thyroid cancer      Prostate cancer      S/P thyroidectomy  Total in 1980      S/P prostatectomy  2004              MEDICATIONS:  aspirin enteric coated 81 milliGRAM(s) Oral daily  carvedilol 12.5 milliGRAM(s) Oral every 12 hours  enalapril 20 milliGRAM(s) Oral daily        acetaminophen     Tablet .. 650 milliGRAM(s) Oral every 6 hours PRN  melatonin 3 milliGRAM(s) Oral at bedtime PRN      atorvastatin 20 milliGRAM(s) Oral at bedtime  levothyroxine 112 MICROGram(s) Oral daily        FAMILY HISTORY:  Family history of pancreatic cancer (Child)        Non-contributory    SOCIAL HISTORY:    [ ] Tobacco  [ ] Drugs  [ ] Alcohol    Allergies    No Known Allergies    Intolerances    	    REVIEW OF SYSTEMS:  CONSTITUTIONAL: No fever  EYES: No eye pain, visual disturbances, or discharge  ENMT:  No difficulty hearing, tinnitus  NECK: No pain or stiffness  RESPIRATORY: No cough, wheezing,  CARDIOVASCULAR: No chest pain, palpitations, passing out, dizziness, or leg swelling  GASTROINTESTINAL:  No nausea, vomiting, diarrhea or constipation. No melena.  GENITOURINARY: No dysuria, hematuria  NEUROLOGICAL: No stroke like symptoms  SKIN: No burning or lesions   ENDOCRINE: No heat or cold intolerance  MUSCULOSKELETAL: No joint pain or swelling  PSYCHIATRIC: No  anxiety, mood swings  HEME/LYMPH: No bleeding gums  ALLERGY AND IMMUNOLOGIC: No hives or eczema	    All other ROS negative    PHYSICAL EXAM:  T(C): 36.6 (11-04-24 @ 19:59), Max: 37 (11-04-24 @ 08:17)  HR: 73 (11-04-24 @ 19:59) (67 - 84)  BP: 164/89 (11-04-24 @ 19:59) (144/85 - 164/89)  RR: 18 (11-04-24 @ 19:59) (16 - 22)  SpO2: 94% (11-04-24 @ 19:59) (94% - 98%)  Wt(kg): --  I&O's Summary      Appearance: Normal	  HEENT:   Normal oral mucosa, EOMI	  Cardiovascular:  S1 S2, No JVD,    Respiratory: Lungs clear to auscultation	  Psychiatry: Alert  Gastrointestinal:  Soft, Non-tender, + BS	  Skin: No rashes   Neurologic: Non-focal  Extremities:  No edema  Vascular: Peripheral pulses palpable    	    	  	  CARDIAC MARKERS:  Labs personally reviewed by me                                  11.9   9.37  )-----------( 241      ( 04 Nov 2024 09:01 )             38.2     11-04    145  |  108  |  19  ----------------------------<  99  3.4[L]   |  24  |  0.97    Ca    8.6      04 Nov 2024 14:04  Phos  3.2     11-04  Mg     2.2     11-04    TPro  6.4  /  Alb  3.5  /  TBili  0.2  /  DBili  x   /  AST  32  /  ALT  31  /  AlkPhos  78  11-04          EKG: Personally reviewed by me - NSR IVCD  Radiology: Personally reviewed by me - CXR Small left pleural effusion.        Assessment/Plan  89M w/Hx of Alzheimer's, Thyroid cancer s/p thyroidectomy, prostate cancer s/p prostatectomy, HFrEF, HTN, HLD presents due to hypoxia and SOB after possible episode of aspiration during brushing teeth.        Problem/Plan - 1:  ·  Problem: Acute sytolic HF  ·  Plan: Patient presents with SOB  - Mild pleural effusion on CXR  - Likely 2/2 to mild CHF exacerbation  - Lasix 20mg IV x2 given  - on PRN lasix that rarely takes, rec Lasix 20mg PO daily and repeat BMP in 2-3 weeks   - Continue enalapril, carvedilol.  - Jardiance avoided 2/2 urine incontinence with diaper    Problem/Plan - 2:  ·  Problem: Elevated troponin.   ·  Plan: - Troponin of 87>89  - Likely in setting of demand ischemia from known sHF  - EKG: No concerning ST-T changes  - Denies chest pain.    Problem/Plan - 3:  ·  Problem: Prolonged QT interval.   ·  Plan: EKG: QTc of 518  - Optimize K, Mg, Ca  - Avoid QT prolonging agents.    Problem/Plan - 4:  ·  Problem: HTN  ·  Plan: - Continue enalapril, carvedilol.      Problem/Plan - 5:  ·  Problem: HLD (hyperlipidemia).   ·  Plan: - Continue aspirin, atorvastatin.    Establish care with [facility]         Differential diagnosis and plan of care discussed with patient after the evaluation. Counseling on diet, nutritional counseling, weight management, exercise and medication compliance was done.   Advanced care planning/advanced directives discussed with patient/family. DNR status including forceful chest compressions to attempt to restart the heart, ventilator support/artificial breathing, electric shock, artificial nutrition, health care proxy, Molst form all discussed with pt. Pt wishes to consider. Sixteen minutes spent on discussing advanced directives.          Conner Valdivia DO [facility]  Cardiovascular Medicine  [address] [place] [address], [address]  Office [phone]  Available via call/text on [employer] Teams

## 2024-11-04 NOTE — DISCHARGE NOTE PROVIDER - HOSPITAL COURSE
HPI:  89M w/Hx of Alzheimer's, Thyroid cancer s/p thyroidectomy, prostate cancer s/p prostatectomy, HFrEF, HTN, HLD presents due to hypoxia and SOB after possible episode of aspiration during brushing teeth. After this episode, patient continued to endorse SOB and EMS was called who noticed his oxygen was about 92% on room air. He was started on 3L NC. On my exam, he is is still endorsing SOB despite receiving 20mg IV Lasix. He denies chest pain.     Patient is alert but not oriented, unclear if this is his baseline. Poor historian Additional hx obtained via chart review and wife and daughter at bedside. (04 Nov 2024 06:23)    Hospital Course: Patient presents with SOB. Mild pleural effusion on CXR. Likely 2/2 to mild CHF exacerbation. Lasix 20mg IV x2 given. Can likely restart on home dose of lasix tomorrow. Echo performed __________??? Also with Troponin of 87>89.  Likely in setting of demand ischemia from SOB, T2MI. EKG: No concerning ST-T changes, denies chest pain.  GOC discussion initiated, daughter does not want to pursue S&S eval but would be interested in further cardiac intervention if deemed necessary though unclear if it will provide added benefit for this patient with seemingly advanced dementia. Patient is full code.       Discharge/Dispo/Med rec discussed with     _____ who has medically cleared patient for discharge with follow-up as advised.    Important Medication Changes and Reason:    Active or Pending Issues Requiring Follow-up:    Advanced Directives:   [ ] Full code  [ ] DNR  [ ] Hospice    Discharge Diagnoses:       HPI:  89M w/Hx of Alzheimer's dementia, thyroid cancer s/p thyroidectomy, prostate cancer s/p prostatectomy, chronic HFrEF, HTN, HLD presents due to hypoxia and SOB after possible episode of aspiration during brushing teeth. After this episode, patient continued to endorse SOB and EMS was called who noticed his oxygen was about 92% on room air. He was started on 3L NC. On my exam, he is still endorsing SOB despite receiving 20mg IV Lasix. He denies chest pain.     Patient is alert but not oriented, unclear if this is his baseline. Poor historian Additional hx obtained via chart review and wife and daughter at bedside. (04 Nov 2024 06:23)    Hospital Course: Small left pleural effusion noted on CXR with elevated proBNP around 2K and elevated troponin (delta flat) possibly due to non-MI troponin elevation due to HF. Last TTE in 2018 showed severe global LV systolic dysfunction with EF 34%. Patient received IV lasix 20mg x 2 dose for suspected acute on chronic HFrEF. Transient hypoxia resolved and patient's respiratory status remained stable on room air and no gross volume overload on exam noted. Repeat TTE showed __________???. Cardiology consulted.    Elevated TSH noted. Free T4 pending but will need to follow up with PMD (Dr. Tyrell Perales) for repeat TFT and adjust home levothyroxine dose accordingly.    GOC discussion initiated, daughter (Larisa) did not want to pursue S&S eval as the patient normally did not exhibit symptom/sign of difficulty with swallowing and it was felt that the choking incident was an isolated event but she would be interested in further cardiac intervention if deemed necessary though unclear if it will provide added benefit for this patient with seemingly advanced dementia. Patient remained full code.       Discharge/Dispo/Med rec discussed with     _____ who has medically cleared patient for discharge with follow-up as advised.    Important Medication Changes and Reason:    Active or Pending Issues Requiring Follow-up:  - repeat thyroid function test with primary care doctor and adjust home levothyroxine dose as needed    Advanced Directives:   [X] Full code  [ ] DNR  [ ] Hospice    Discharge Diagnoses:  Acute on chronic HFrEF  Alzheimer's dementia  HTN  Hypothyroid           HPI:  89M w/Hx of Alzheimer's dementia, thyroid cancer s/p thyroidectomy, prostate cancer s/p prostatectomy, chronic HFrEF, HTN, HLD presents due to hypoxia and SOB after possible episode of aspiration during brushing teeth. After this episode, patient continued to endorse SOB and EMS was called who noticed his oxygen was about 92% on room air. He was started on 3L NC. On my exam, he is still endorsing SOB despite receiving 20mg IV Lasix. He denies chest pain.     Patient is alert but not oriented, unclear if this is his baseline. Poor historian Additional hx obtained via chart review and wife and daughter at bedside. (04 Nov 2024 06:23)    Hospital Course: Small left pleural effusion noted on CXR with elevated proBNP around 2K and elevated troponin (delta flat) possibly due to non-MI troponin elevation due to HF. Last TTE in 2018 showed severe global LV systolic dysfunction with EF 34%. Patient received IV lasix 20mg x 2 dose for suspected acute on chronic HFrEF. Transient hypoxia resolved and patient's respiratory status remained stable on room air and no gross volume overload on exam noted. Daughter reported the patient appeared improved as well.    Repeat TTE showed:  Left ventricular cavity is normal in size. Left ventricular wall thickness is normal. Left ventricular systolic function is severely decreased with an ejection fraction visually estimated at 30 %. Global left ventricular hypokinesis.  Elevated left ventricular filling pressure.  Moderately enlarged right ventricular cavity size and moderately reduced right ventricular systolic function.  Moderate to severe mitral regurgitation.  Mild to moderate pulmonary hypertension.    Cardiology consulted and recommended to continue with lasix 20mg po daily (per daughter, patient was taking it as needed at home) and no further inpatient work up.    Elevated TSH of 11.6 noted. Free T4 pending but will need to follow up with PMD (Dr. Tyrell Perales) for repeat TFT and adjust home levothyroxine dose accordingly. Per daughter, patient was supposed to be on levothyroxine 112mcg 6 days a week, advised to continue it daily for now until follow up with PMD.    GOC discussion initiated, daughter (Larisa) did not want to pursue S&S eval as the patient normally did not exhibit symptom/sign of difficulty with swallowing and it was felt that the choking incident was an isolated event but she would be interested in further cardiac intervention if deemed necessary though unclear if it will provide added benefit for this patient with seemingly advanced dementia. Patient remained full code.     Discharge/Dispo/Med rec discussed with     _____ who has medically cleared patient for discharge with follow-up as advised.    Important Medication Changes and Reason:  Lasix 20mg PO daily    Active or Pending Issues Requiring Follow-up:  - repeat thyroid function test with primary care doctor and adjust home levothyroxine dose as needed  - check BMP in 1-2 weeks    Advanced Directives:   [X] Full code  [ ] DNR  [ ] Hospice    Discharge Diagnoses:  Acute on chronic HFrEF  Alzheimer's dementia  HTN  Hypothyroid           HPI:  89M w/Hx of Alzheimer's dementia, thyroid cancer s/p thyroidectomy, prostate cancer s/p prostatectomy, chronic HFrEF, HTN, HLD presents due to hypoxia and SOB after possible episode of aspiration during brushing teeth. After this episode, patient continued to endorse SOB and EMS was called who noticed his oxygen was about 92% on room air. He was started on 3L NC. On my exam, he is still endorsing SOB despite receiving 20mg IV Lasix. He denies chest pain.     Patient is alert but not oriented, unclear if this is his baseline. Poor historian Additional hx obtained via chart review and wife and daughter at bedside. (04 Nov 2024 06:23)    Hospital Course: Small left pleural effusion noted on CXR with elevated proBNP around 2K and elevated troponin (delta flat) possibly due to non-MI troponin elevation due to HF. Last TTE in 2018 showed severe global LV systolic dysfunction with EF 34%. Patient received IV lasix 20mg x 2 dose for suspected acute on chronic HFrEF. Transient hypoxia resolved and patient's respiratory status remained stable on room air and no gross volume overload on exam noted. Daughter reported the patient appeared improved as well.    Repeat TTE showed:  Left ventricular cavity is normal in size. Left ventricular wall thickness is normal. Left ventricular systolic function is severely decreased with an ejection fraction visually estimated at 30 %. Global left ventricular hypokinesis.  Elevated left ventricular filling pressure.  Moderately enlarged right ventricular cavity size and moderately reduced right ventricular systolic function.  Moderate to severe mitral regurgitation.  Mild to moderate pulmonary hypertension.    Cardiology consulted and recommended to continue with lasix 20mg po daily (per daughter, patient was taking it as needed at home) and no further inpatient work up.    Elevated TSH of 11.6 noted. Free T4 pending but will need to follow up with PMD (Dr. Tyrell Perales) for repeat TFT and adjust home levothyroxine dose accordingly. Per daughter, patient was supposed to be on levothyroxine 112mcg 6 days a week, advised to continue it daily for now until follow up with PMD.    GOC discussion initiated, daughter (Larisa) did not want to pursue S&S eval as the patient normally did not exhibit symptom/sign of difficulty with swallowing and it was felt that the choking incident was an isolated event but she would be interested in further cardiac intervention if deemed necessary though unclear if it will provide added benefit for this patient with seemingly advanced dementia. Patient remained full code.     Discharge/Dispo/Med rec discussed with     _____ who has medically cleared patient for discharge with follow-up as advised.    Important Medication Changes and Reason:  Lasix 20mg PO daily  Levothyroxine 112mcg daily    Active or Pending Issues Requiring Follow-up:  - repeat thyroid function test with primary care doctor and adjust home levothyroxine dose as needed  - check BMP in 1-2 weeks    Advanced Directives:   [X] Full code  [ ] DNR  [ ] Hospice    Discharge Diagnoses:  Acute on chronic HFrEF  Alzheimer's dementia  HTN  Hypothyroid           HPI:  89M w/Hx of Alzheimer's dementia, thyroid cancer s/p thyroidectomy, prostate cancer s/p prostatectomy, chronic HFrEF, HTN, HLD presents due to hypoxia and SOB after possible episode of aspiration during brushing teeth. After this episode, patient continued to endorse SOB and EMS was called who noticed his oxygen was about 92% on room air. He was started on 3L NC. On my exam, he is still endorsing SOB despite receiving 20mg IV Lasix. He denies chest pain.     Patient is alert but not oriented, unclear if this is his baseline. Poor historian Additional hx obtained via chart review and wife and daughter at bedside. (04 Nov 2024 06:23)    Hospital Course: Small left pleural effusion noted on CXR with elevated proBNP around 2K and elevated troponin (delta flat) possibly due to non-MI troponin elevation due to HF. Last TTE in 2018 showed severe global LV systolic dysfunction with EF 34%. Patient received IV lasix 20mg x 2 dose for suspected acute on chronic HFrEF. Transient hypoxia resolved and patient's respiratory status remained stable on room air and no gross volume overload on exam noted. Daughter reported the patient appeared improved as well.    Repeat TTE showed:  Left ventricular cavity is normal in size. Left ventricular wall thickness is normal. Left ventricular systolic function is severely decreased with an ejection fraction visually estimated at 30 %. Global left ventricular hypokinesis.  Elevated left ventricular filling pressure.  Moderately enlarged right ventricular cavity size and moderately reduced right ventricular systolic function.  Moderate to severe mitral regurgitation.  Mild to moderate pulmonary hypertension.    Cardiology consulted and recommended to continue with lasix 20mg po daily (per daughter, patient was taking it as needed at home) and no further inpatient work up.    Elevated TSH of 11.6 noted. Free T4 pending but will need to follow up with PMD (Dr. Tyrell Perales) for repeat TFT and adjust home levothyroxine dose accordingly. Per daughter, patient was supposed to be on levothyroxine 112mcg 6 days a week, advised to continue it daily for now until follow up with PMD.   echo:   1. Left ventricular cavity is normal in size. Left ventricular wall thickness is normal. Left ventricular systolic function is severely decreased with an ejection fraction visually estimated at 30 %. Global left ventricular hypokinesis.   2. Elevated left ventricular filling pressure.   3. Moderately enlarged right ventricular cavity size and moderately reduced right ventricular systolic function.   4. Moderate to severe mitral regurgitation.   5. Mild to moderate pulmonary hypertension.    GOC discussion initiated, daughter (Larisa) did not want to pursue S&S eval as the patient normally did not exhibit symptom/sign of difficulty with swallowing and it was felt that the choking incident was an isolated event but she would be interested in further cardiac intervention if deemed necessary though unclear if it will provide added benefit for this patient with seemingly advanced dementia. Patient remained full code.     Discharge/Dispo/Med rec discussed with  Dr. Cope who has medically cleared patient for discharge with follow-up as advised.    Important Medication Changes and Reason:  Lasix 20mg PO daily  Levothyroxine 112mcg daily    Active or Pending Issues Requiring Follow-up:  - repeat thyroid function test with primary care doctor and adjust home levothyroxine dose as needed  - check BMP in 1-2 weeks    Advanced Directives:   [X] Full code  [ ] DNR  [ ] Hospice    Discharge Diagnoses:  Acute on chronic HFrEF  Alzheimer's dementia  HTN  Hypothyroid           HPI:  89M w/Hx of Alzheimer's dementia, thyroid cancer s/p thyroidectomy, prostate cancer s/p prostatectomy, chronic HFrEF, HTN, HLD presents due to hypoxia and SOB after possible episode of aspiration during brushing teeth. After this episode, patient continued to endorse SOB and EMS was called who noticed his oxygen was about 92% on room air. He was started on 3L NC. On my exam, he is still endorsing SOB despite receiving 20mg IV Lasix. He denies chest pain.     Patient is alert but not oriented, unclear if this is his baseline. Poor historian Additional hx obtained via chart review and wife and daughter at bedside. (04 Nov 2024 06:23)    Hospital Course: Small left pleural effusion noted on CXR with elevated proBNP around 2K and elevated troponin (delta flat) possibly due to non-MI troponin elevation due to HF. Last TTE in 2018 showed severe global LV systolic dysfunction with EF 34%. Patient received IV lasix 20mg x 2 dose for suspected acute on chronic HFrEF. Transient hypoxia resolved and patient's respiratory status remained stable on room air and no gross volume overload on exam noted. Daughter reported the patient appeared improved as well.    Repeat TTE showed:  Left ventricular cavity is normal in size. Left ventricular wall thickness is normal. Left ventricular systolic function is severely decreased with an ejection fraction visually estimated at 30 %. Global left ventricular hypokinesis.  Elevated left ventricular filling pressure.  Moderately enlarged right ventricular cavity size and moderately reduced right ventricular systolic function.  Moderate to severe mitral regurgitation.  Mild to moderate pulmonary hypertension.    Cardiology consulted and recommended to continue with lasix 20mg po daily (per daughter, patient was taking it as needed at home) and no further inpatient work up.    Elevated TSH of 11.6 noted. Free T4 pending but will need to follow up with PMD (Dr. Tyrell Perales) for repeat TFT and adjust home levothyroxine dose accordingly. Per daughter, patient was supposed to be on levothyroxine 112mcg 6 days a week, advised to continue it daily for now until follow up with PMD.    GOC discussion initiated, daughter (Larisa) did not want to pursue S&S eval as the patient normally did not exhibit symptom/sign of difficulty with swallowing and it was felt that the choking incident was an isolated event but she would be interested in further cardiac intervention if deemed necessary though unclear if it will provide added benefit for this patient with seemingly advanced dementia. Patient remained full code.     Discharge/Dispo/Med rec discussed with  Dr. Cope who has medically cleared patient for discharge with follow-up as advised.    Important Medication Changes and Reason:  Lasix 20mg PO daily  Levothyroxine 112mcg daily    Active or Pending Issues Requiring Follow-up:  - repeat thyroid function test with primary care doctor and adjust home levothyroxine dose as needed  - check BMP in 1-2 weeks    Advanced Directives:   [X] Full code  [ ] DNR  [ ] Hospice    Discharge Diagnoses:  Acute on chronic HFrEF  Alzheimer's dementia  HTN  Hypothyroid

## 2024-11-04 NOTE — DISCHARGE NOTE NURSING/CASE MANAGEMENT/SOCIAL WORK - PATIENT PORTAL LINK FT
You can access the FollowMyHealth Patient Portal offered by [facility] by registering at the following website: [email or web address]. By joining [facility]’s FollowMyHealth portal, you will also be able to view your health information using other applications (apps) compatible with our system.

## 2024-11-04 NOTE — DISCHARGE NOTE NURSING/CASE MANAGEMENT/SOCIAL WORK - NSDCPEFALRISK_GEN_ALL_CORE
For information on Fall & Injury Prevention, visit: [email or web address] OR  [email or web address] OR  https://www.cdc.gov/steadi/patient.html

## 2024-11-04 NOTE — DISCHARGE NOTE NURSING/CASE MANAGEMENT/SOCIAL WORK - FINANCIAL ASSISTANCE
[facility] provides services at a reduced cost to those who are determined to be eligible through [facility]’s financial assistance program. Information regarding [facility]’s financial assistance program can be found by going to [email or web address] or by calling [phone].

## 2024-11-04 NOTE — H&P ADULT - NSICDXPASTMEDICALHX_GEN_ALL_CORE_FT
PAST MEDICAL HISTORY:  Dementia     HLD (hyperlipidemia)     HTN (hypertension)     Prostate cancer     Thyroid cancer     
no

## 2024-11-04 NOTE — ED ADULT NURSE NOTE - NSFALLHARMRISKINTERV_ED_ALL_ED

## 2024-11-04 NOTE — H&P ADULT - NSHPPHYSICALEXAM_GEN_ALL_CORE
Vital Signs Last 24 Hrs  T(C): 37 (04 Nov 2024 08:17), Max: 37 (04 Nov 2024 08:17)  T(F): 98.6 (04 Nov 2024 08:17), Max: 98.6 (04 Nov 2024 08:17)  HR: 70 (04 Nov 2024 08:17) (70 - 84)  BP: 161/96 (04 Nov 2024 08:17) (144/85 - 161/96)  BP(mean): --  RR: 20 (04 Nov 2024 08:17) (16 - 22)  SpO2: 96% (04 Nov 2024 08:17) (96% - 98%)    Parameters below as of 04 Nov 2024 08:17  Patient On (Oxygen Delivery Method): room air        CONSTITUTIONAL: No apparent distress  EYES: PERRLA and symmetric, EOMI, No conjunctival or scleral injection, non-icteric  ENMT: Oral mucosa with moist membranes.  NECK: Supple, symmetric. No JVD.  RESP: No respiratory distress, no use of accessory muscles; CTA b/l, no WRR  CV: RRR, +S1S2, no MRG  GI: Soft, NT, ND, no rebound, no guarding  : No suprapubic tenderness. No CVA tenderness.  LYMPH: No cervical LAD or tenderness  MSK: No spinal tenderness. Muscles of extremities mildly contracted  EXTREMITIES: No pedal edema  SKIN: No rashes. Ulcer of R hip stage 4, L hip stage 2, R heel dressed, POA.  NEURO: Alert, responsive. No tremor.

## 2024-11-04 NOTE — H&P ADULT - ASSESSMENT
89M w/Hx of Alzheimer's, Thyroid cancer s/p thyroidectomy, prostate cancer s/p prostatectomy, HFrEF, HTN, HLD presents due to hypoxia and SOB after possible episode of aspiration during brushing teeth.

## 2024-11-04 NOTE — DISCHARGE NOTE PROVIDER - NSDCMRMEDTOKEN_GEN_ALL_CORE_FT
aspirin 81 mg oral tablet: 1 tab(s) orally once a day  atorvastatin 20 mg oral tablet: 1 tab(s) orally once a day (at bedtime)  carvedilol 12.5 mg oral tablet: 1 tab(s) orally 2 times a day  cholecalciferol 125 mcg (5000 intl units) oral tablet: 1 tab(s) orally once a week  enalapril 20 mg oral tablet: 1 tab(s) orally once a day  furosemide 20 mg oral tablet: 1 tab(s) orally once a day  gabapentin 100 mg oral capsule: 1 cap(s) orally 3 times a day  levothyroxine 112 mcg (0.112 mg) oral tablet: 1 tab(s) orally once a day   aspirin 81 mg oral tablet: 1 tab(s) orally once a day  atorvastatin 20 mg oral tablet: 1 tab(s) orally once a day (at bedtime)  carvedilol 12.5 mg oral tablet: 1 tab(s) orally 2 times a day  cholecalciferol 125 mcg (5000 intl units) oral tablet: 1 tab(s) orally once a week  enalapril 20 mg oral tablet: 1 tab(s) orally once a day  furosemide 20 mg oral tablet: 1 tab(s) orally once a day  levothyroxine 112 mcg (0.112 mg) oral tablet: 1 tab(s) orally once a day  memantine: 2 times a day   aspirin 81 mg oral tablet: 1 tab(s) orally once a day  atorvastatin 20 mg oral tablet: 1 tab(s) orally once a day (at bedtime)  carvedilol 12.5 mg oral tablet: 1 tab(s) orally 2 times a day  cholecalciferol 125 mcg (5000 intl units) oral tablet: 1 tab(s) orally once a week  enalapril 20 mg oral tablet: 1 tab(s) orally once a day  furosemide 20 mg oral tablet: 1 tab(s) orally once a day  levothyroxine 112 mcg (0.112 mg) oral tablet: 1 tab(s) orally once a day  memantine 10 mg oral tablet: 1 tab(s) orally 2 times a day

## 2024-11-04 NOTE — CHART NOTE - NSCHARTNOTEFT_GEN_A_CORE
Based on my assessment, this patient’s condition has improved and no longer warrants inpatient status and will be changed to outpatient status prior to being discharged from the hospital.  This decision is based on the following reasons: improved and stable respiratory status.

## 2024-11-04 NOTE — ED PROVIDER NOTE - NSICDXFAMILYHX_GEN_ALL_CORE_FT
FAMILY HISTORY:  Child  Still living? No  Family history of pancreatic cancer, Age at diagnosis: Age Unknown

## 2024-11-04 NOTE — H&P ADULT - HISTORY OF PRESENT ILLNESS
89M w/Hx of Alzheimer's, Thyroid cancer s/p thyroidectomy, prostate cancer s/p prostatectomy, HFrEF, HTN, HLD presents due to hypoxia and SOB after possible episode of aspiration during brushing teeth. 89M w/Hx of Alzheimer's, Thyroid cancer s/p thyroidectomy, prostate cancer s/p prostatectomy, HFrEF, HTN, HLD presents due to hypoxia and SOB after possible episode of aspiration during brushing teeth. After this episode, patient continued to endorse SOB and EMS was called who noticed his oxygen was about 92% on room air. He was started on 3L NC. On my exam, he is is still endorsing SOB despite receiving 20mg IV Lasix. He denies chest pain.     Patient is alert but not oriented, unclear if this is his baseline. Poor historian Additional hx obtained via chart review and wife and daughter at bedside.

## 2024-11-04 NOTE — H&P ADULT - PROBLEM SELECTOR PLAN 2
- Troponin of 87>89  - Likely in setting of demand ischemia from SOB, T2MI  - EKG: No concerning ST-T changes  - Denies chest pain

## 2024-11-04 NOTE — CHART NOTE - NSCHARTNOTEFT_GEN_A_CORE
Patient seen and examined. Daughter(Larisa) at bedside helped to provide additional information. Patient at baseline is oriented to person and is dependent on all ADL. He was recently hospitalized at OSH for hip abscess in 8/2024 and received wound care and home PT at home. Patient has mostly been bedbound for the last few weeks. Larisa noted patient would intermittently have some difficulty with breathing over the last couple of weeks as well which would get better with sitting up. He normally does not have any difficulty swallowing food and she feels the choking incident was an isolated event.     A/P: 89M Hx of Alzheimer's dementia, thyroid cancer s/p thyroidectomy, prostate cancer s/p prostatectomy, chronic HFrEF, HTN, HLD presented with transient hypoxia and SOB after suspected episode of aspiration during brushing teeth. Patient also noted with elevated proBNP, troponin, and small left pleural effusion on CXR raising concern for possible component of acute on chronic HFrEF.    - patient's respiratory status now appears stable on room air  - s/p IV lasix 20mg x 2 in ED  - clinically does not appear to be grossly volume overloaded on exam   - last TTE in 2018 showed severe global LV systolic dysfunction with EF 34%  - f/u repeat TTE  - trop elevated but delta flat, suspect elevated troponin due to non-MI troponin elevation due to HF  - per daughter, patient follows with PMD (Dr. Tyrell Perales) but does not follow with a cardiologist  - cardiology consult called  - continue with home meds  - elevated TSH noted, f/u FT4 but may need to consider increasing levothyroxine to 125mcg  - continue with wound care for right hip wound (per daughter, appears to be improving)  - GOC discussion initiated, daughter does not want to pursue S&S eval but would be interested in further cardiac intervention if deemed necessary though unclear if it will provide added benefit for this patient with seemingly advanced dementia. Patient is full code. I've asked Palliative care attending, Dr. Renita Frost, to evaluate the patient.  - pending TTE, if no further inpatient work up deemed necessary, will plan for discharge back home on PO diuretic and resumption of home services.

## 2024-11-04 NOTE — DISCHARGE NOTE PROVIDER - PROVIDER TOKENS
FREE:[LAST:[Diaz],FIRST:[Tyrell],PHONE:[(   )    -],FAX:[(   )    -],FOLLOWUP:[1-3 days]] FREE:[LAST:[Diaz],FIRST:[Tyrell],PHONE:[(   )    -],FAX:[(   )    -],FOLLOWUP:[1 week]] FREE:[LAST:[Tyrell Lawrence PCP],PHONE:[phone]],FAX:[(   )    -],ADDRESS:[[place]],FOLLOWUP:[1-3 days],ESTABLISHEDPATIENT:[T]]

## 2024-11-04 NOTE — DISCHARGE NOTE PROVIDER - NSDCFUADDAPPT_GEN_ALL_CORE_FT
APPTS ARE READY TO BE MADE: [X] YES    Best Family or Patient Contact (if needed):    Additional Information about above appointments (if needed):    1: PCP  2: [facility] calls  3:     Other comments or requests:    APPTS ARE READY TO BE MADE: [X] YES    Best Family or Patient Contact (if needed):    Additional Information about above appointments (if needed):    1: PCP  2: [facility] calls  3:     Other comments or requests:     [facility] office was contacted to secure an appointment by the clinical team, however the office will follow up with the patient/caregiver directly. Patient is awaiting call from [facility] and will call us on 11/11 should they not receive a response

## 2024-11-04 NOTE — ED PROVIDER NOTE - PROGRESS NOTE DETAILS
Nhan St MD PGY2: Patient reassessed, stable. Sat 100%, no signs of respiratory distress. Daughter at bedside, corroborates EMS story. Transient coughing / choking while brushing his teeth and swishing with water. Symptoms improved after coughing, when he went to lay down shortly after was still coughing so brought patient in for eval. Reports she felt like he may have had some mild shortness of breath the last few days but denies any change in his mental status. No chest pain, focal weakness. Eating and drinking well at his baseline per daughter. Plan continues to be CXR and obs. Likely DC if no acute events and CXR without evidence of marked pneumonia/pneumonitis. Nhan St MD PGY2: Patient reassessed, stable. CXR with evidence of likely b/l effusions. Review of previous documents showed TTE performed in 2018 showed EF of 35%. Will get basic and trop/BNP for CHF work up. Dispo pending. Nhan St MD, PGY2: Patient reassessed, mild tachypneic. CXR w/ left effusion small. Trop 80s likely demand less like ACS, will get 3 hr for delta. BNP 2890. No recent for comparison, last TTE in 2018. Will admit for cards eval, TTE.

## 2024-11-04 NOTE — ED ADULT NURSE NOTE - OBJECTIVE STATEMENT
89y male w/ pmh of Dementia, HLD,HTN presents for shortness  of breath. Pt BIBEMS who states patients daughter called them after patient began to choke on water when she was brushing his teeth. As per EMS, daughter states pt has been short of breath for the past two weeks. Pt unable to provide history in ED due to baseline mental status AOx1. Pt in no distress in ED.

## 2024-11-04 NOTE — H&P ADULT - PROBLEM SELECTOR PLAN 1
Patient presents with SOB  - Mild pleural effusion on CXR  - Likely 2/2 to mild CHF exacerbation  - Lasix 20mg IV x2 given  - Can likely restart on home dose of lasix tomorrow  - F/u TTE

## 2024-11-04 NOTE — DISCHARGE NOTE PROVIDER - NSDCCPCAREPLAN_GEN_ALL_CORE_FT
PRINCIPAL DISCHARGE DIAGNOSIS  Diagnosis: Shortness of breath  Assessment and Plan of Treatment: Mild pleural effusion on CXR. Likely 2/2 to mild CHF exacerbation. Lasix 20mg IV x2 given. Can likely restart on home dose of lasix tomorrow. Echo performed __________???      SECONDARY DISCHARGE DIAGNOSES  Diagnosis: Elevated troponin  Assessment and Plan of Treatment: Troponin of 87>89.  Likely in setting of demand ischemia from SOB, T2MI. EKG: No concerning ST-T changes, denies chest pain     PRINCIPAL DISCHARGE DIAGNOSIS  Diagnosis: Shortness of breath  Assessment and Plan of Treatment: Mild pleural effusion on CXR. Likely 2/2 to mild CHF exacerbation. Lasix 20mg IV x2 given. Can likely restart on home dose of lasix tomorrow. Echo performed __________???      SECONDARY DISCHARGE DIAGNOSES  Diagnosis: Hypothyroid  Assessment and Plan of Treatment: Your TSH level was noted to be elevated (11). Please repeat thyroid function test with your primary care doctor and see if your levothyroxine dose needs to be adjusted     PRINCIPAL DISCHARGE DIAGNOSIS  Diagnosis: Shortness of breath  Assessment and Plan of Treatment: Mild pleural effusion on CXR. Likely 2/2 to mild CHF exacerbation. Lasix 20mg IV x2 given. Can likely restart on home dose of lasix tomorrow. Echo performed __________???      SECONDARY DISCHARGE DIAGNOSES  Diagnosis: Hypothyroid  Assessment and Plan of Treatment: Your TSH level was noted to be elevated (11.6). Please repeat thyroid function test with your primary care doctor and see if your levothyroxine dose needs to be adjusted     PRINCIPAL DISCHARGE DIAGNOSIS  Diagnosis: Shortness of breath  Assessment and Plan of Treatment: Mild pleural effusion on CXR. Likely 2/2 to mild CHF exacerbation. Lasix 20mg IV x2 given. restart on home dose of lasix tomorrow. Echo performed    1. Left ventricular cavity is normal in size. Left ventricular wall thickness is normal. Left ventricular systolic function is severely decreased with an ejection fraction visually estimated at 30 %. Global left ventricular hypokinesis.   2. Elevated left ventricular filling pressure.   3. Moderately enlarged right ventricular cavity size and moderately reduced right ventricular systolic function.   4. Moderate to severe mitral regurgitation.   5. Mild to moderate pulmonary hypertension.  Bring these papers to your PCP      SECONDARY DISCHARGE DIAGNOSES  Diagnosis: Hypothyroid  Assessment and Plan of Treatment: Your TSH level was noted to be elevated (11.6). Please repeat thyroid function test with your primary care doctor and see if your levothyroxine dose needs to be adjusted     PRINCIPAL DISCHARGE DIAGNOSIS  Diagnosis: Shortness of breath  Assessment and Plan of Treatment: Mild pleural effusion on CXR. Likely 2/2 to mild CHF exacerbation. Lasix 20mg IV x2 given. restart on lasix tomorrow, take daily. Echo performed    1. Left ventricular cavity is normal in size. Left ventricular wall thickness is normal. Left ventricular systolic function is severely decreased with an ejection fraction visually estimated at 30 %. Global left ventricular hypokinesis.   2. Elevated left ventricular filling pressure.   3. Moderately enlarged right ventricular cavity size and moderately reduced right ventricular systolic function.   4. Moderate to severe mitral regurgitation.   5. Mild to moderate pulmonary hypertension.  Bring these papers to your PCP      SECONDARY DISCHARGE DIAGNOSES  Diagnosis: Hypothyroid  Assessment and Plan of Treatment: Your TSH level was noted to be elevated (11.6). Please repeat thyroid function test and BMP in 1 week with your primary care doctor and see if your levothyroxine dose needs to be adjusted

## 2024-11-04 NOTE — DISCHARGE NOTE PROVIDER - CARE PROVIDER_API CALL
Tyrell Perales  Phone: (   )    -  Fax: (   )    -  Follow Up Time: 1-3 days   Tyrell Perales  Phone: (   )    -  Fax: (   )    -  Follow Up Time: 1 week   Tyrell Lawrence PCP,   [place]  Phone: [phone]  Fax: (   )    -  Established Patient  Follow Up Time: 1-3 days

## 2024-11-04 NOTE — ED PROVIDER NOTE - OBJECTIVE STATEMENT
89-year-old male history of Alzheimer's, hypertension, HLD presented to ED after daughter called 911 for witnessed choking episode.  EMS is providing history per daughter's reports, states that patient is pressing his teeth when he choked on the water.  Had a subsequent episode of shortness of breath.  Patient does not have oxygen requirements at home.  EMS placed patient on 2 to 3 L nasal cannula for O2 sat 92 to 97%.  Patient able to report that he is feeling "better".  However history is otherwise limited from patient's secondary to mental status which EMS reports is at baseline per daughter.

## 2024-11-04 NOTE — ED PROVIDER NOTE - ATTENDING CONTRIBUTION TO CARE
Attending Hamilton: I performed a history and physical exam of the patient and discussed their management with the resident/fellow/student. I have reviewed the resident/fellow/student note and agree with the documented findings and plan of care, except as noted. I have personally performed a substantive portion of the visit including all aspects of the medical decision making. My medical decision making and observations are found below. Please refer to any progress notes for updates on clinical course. My notes supersedes the above resident/fellow/student note in case of discrepancy    MDM:  89 y/o M w/ PMH of Alzheimer's, HTN, HLD presented to ED after daughter called 911 for witnessed choking episode. Seen and examined together w/ resident. EMS is providing history per daughter's report, states that patient was brushing his teeth when he choked on the water.  Had a subsequent episode of shortness of breath.  Patient does not have oxygen requirements at home.  EMS placed patient on 2 to 3 L nasal cannula for O2 sat 92 to 97%.  Patient able to report that he is feeling "better".  However history is otherwise limited from patient's secondary to mental status which EMS reports is at baseline per daughter.    Gen: NAD, awake and alert  Head: NCAT  HEENT: EOMI, oral mucosa moist, normal conjunctiva  Lung: no respiratory distress, CTAB, no wheezes/rhonchi/rales B/L, speaking in full sentences  CV: RRR, no murmurs  Abd: non distended, soft, nontender, no guarding, no CVA tenderness  MSK: no visible deformities  Neuro: Appears non focal  Skin: Warm, well perfused  Psych: normal affect     Pt overall well appearing, no acute distress. No resp distress. Will eval for evidence of aspiration. Will obtain collateral when daughter arrives to determine if additional intervention is needed. Will plan for minimum xray and obs period in ED. Will reassess the need for additional interventions as clinically warranted. Refer to any progress notes for updates on clinical course and as a continuation of this MDM.     I, Dr. Vu Villasenor, independently interpreted the EKG which showed NSR at a rate of 75.

## 2024-11-05 ENCOUNTER — TRANSCRIPTION ENCOUNTER (OUTPATIENT)
Age: 89
End: 2024-11-05

## 2024-11-11 ENCOUNTER — TRANSCRIPTION ENCOUNTER (OUTPATIENT)
Age: 89
End: 2024-11-11

## 2024-11-15 ENCOUNTER — APPOINTMENT (OUTPATIENT)
Dept: CARE COORDINATION | Facility: HOME HEALTH | Age: 89
End: 2024-11-15

## 2024-11-15 ENCOUNTER — APPOINTMENT (OUTPATIENT)
Dept: CARE COORDINATION | Facility: HOME HEALTH | Age: 89
End: 2024-11-15
Payer: MEDICARE

## 2024-11-15 VITALS
TEMPERATURE: 97.7 F | OXYGEN SATURATION: 97 % | HEART RATE: 65 BPM | RESPIRATION RATE: 16 BRPM | SYSTOLIC BLOOD PRESSURE: 159 MMHG | DIASTOLIC BLOOD PRESSURE: 96 MMHG

## 2024-11-15 DIAGNOSIS — L89.612 PRESSURE ULCER OF RIGHT HEEL, STAGE 2: ICD-10-CM

## 2024-11-15 DIAGNOSIS — F02.80 OTHER ALZHEIMER'S DISEASE: ICD-10-CM

## 2024-11-15 DIAGNOSIS — R13.10 DYSPHAGIA, UNSPECIFIED: ICD-10-CM

## 2024-11-15 DIAGNOSIS — E78.49 OTHER HYPERLIPIDEMIA: ICD-10-CM

## 2024-11-15 DIAGNOSIS — E03.9 HYPOTHYROIDISM, UNSPECIFIED: ICD-10-CM

## 2024-11-15 DIAGNOSIS — C61 MALIGNANT NEOPLASM OF PROSTATE: ICD-10-CM

## 2024-11-15 DIAGNOSIS — L89.214 PRESSURE ULCER OF RIGHT HIP, STAGE 4: ICD-10-CM

## 2024-11-15 DIAGNOSIS — I50.20 UNSPECIFIED SYSTOLIC (CONGESTIVE) HEART FAILURE: ICD-10-CM

## 2024-11-15 DIAGNOSIS — G30.8 OTHER ALZHEIMER'S DISEASE: ICD-10-CM

## 2024-11-15 PROCEDURE — 99495 TRANSJ CARE MGMT MOD F2F 14D: CPT

## 2024-11-20 PROBLEM — I50.20 HEART FAILURE WITH REDUCED EJECTION FRACTION (HFREF, <= 40%): Status: ACTIVE | Noted: 2024-11-20

## 2024-11-21 ENCOUNTER — TRANSCRIPTION ENCOUNTER (OUTPATIENT)
Age: 89
End: 2024-11-21

## 2024-11-21 PROBLEM — E03.9 HYPOTHYROIDISM, UNSPECIFIED TYPE: Status: ACTIVE | Noted: 2024-11-21

## 2024-11-21 PROBLEM — E78.49 OTHER HYPERLIPIDEMIA: Status: ACTIVE | Noted: 2024-11-21

## 2024-11-21 PROBLEM — L89.214 PRESSURE INJURY OF RIGHT HIP, STAGE 4: Status: ACTIVE | Noted: 2024-11-21

## 2024-11-21 PROBLEM — R13.10 DYSPHAGIA: Status: ACTIVE | Noted: 2024-11-21

## 2024-11-21 PROBLEM — C61 PROSTATE CANCER: Status: ACTIVE | Noted: 2024-11-21

## 2024-11-21 PROBLEM — G30.8: Status: ACTIVE | Noted: 2024-11-21

## 2024-11-21 PROBLEM — L89.612 PRESSURE INJURY OF RIGHT HEEL, STAGE 2: Status: ACTIVE | Noted: 2024-11-21

## 2024-11-21 RX ORDER — ATORVASTATIN CALCIUM 20 MG/1
20 TABLET, FILM COATED ORAL
Refills: 0 | Status: ACTIVE | COMMUNITY
Start: 2024-11-21

## 2024-11-21 RX ORDER — ASPIRIN 81 MG/1
81 TABLET, CHEWABLE ORAL
Refills: 0 | Status: ACTIVE | COMMUNITY
Start: 2024-11-21

## 2024-11-21 RX ORDER — CARVEDILOL 12.5 MG/1
12.5 TABLET, FILM COATED ORAL TWICE DAILY
Refills: 0 | Status: ACTIVE | COMMUNITY
Start: 2024-11-21

## 2024-11-21 RX ORDER — ENALAPRIL MALEATE 20 MG/1
20 TABLET ORAL DAILY
Refills: 0 | Status: ACTIVE | COMMUNITY
Start: 2024-11-21

## 2024-11-21 RX ORDER — FUROSEMIDE 20 MG/1
20 TABLET ORAL DAILY
Refills: 0 | Status: ACTIVE | COMMUNITY
Start: 2024-11-21

## 2024-11-21 RX ORDER — CHOLECALCIFEROL (VITAMIN D3) 1250 MCG
1.25 MG CAPSULE ORAL WEEKLY
Refills: 0 | Status: ACTIVE | COMMUNITY
Start: 2024-11-21

## 2024-11-21 RX ORDER — COLLAGENASE SANTYL 250 [ARB'U]/G
250 OINTMENT TOPICAL DAILY
Refills: 0 | Status: ACTIVE | COMMUNITY
Start: 2024-11-21

## 2024-11-21 RX ORDER — MEMANTINE HYDROCHLORIDE 10 MG/1
10 TABLET, FILM COATED ORAL TWICE DAILY
Refills: 0 | Status: ACTIVE | COMMUNITY
Start: 2024-11-21

## 2024-11-21 RX ORDER — LEVOTHYROXINE SODIUM 112 UG/1
112 TABLET ORAL
Refills: 0 | Status: ACTIVE | COMMUNITY
Start: 2024-11-21

## 2024-11-25 ENCOUNTER — TRANSCRIPTION ENCOUNTER (OUTPATIENT)
Age: 89
End: 2024-11-25

## 2024-12-05 ENCOUNTER — TRANSCRIPTION ENCOUNTER (OUTPATIENT)
Age: 88
End: 2024-12-05

## 2024-12-19 ENCOUNTER — INPATIENT (INPATIENT)
Facility: HOSPITAL | Age: 88
LOS: 5 days | Discharge: OTHER INSTITUTION | DRG: 446 | End: 2024-12-25
Attending: INTERNAL MEDICINE | Admitting: HOSPITALIST
Payer: MEDICARE

## 2024-12-19 VITALS
RESPIRATION RATE: 16 BRPM | SYSTOLIC BLOOD PRESSURE: 127 MMHG | HEART RATE: 82 BPM | TEMPERATURE: 98 F | OXYGEN SATURATION: 98 % | DIASTOLIC BLOOD PRESSURE: 82 MMHG | WEIGHT: 160.06 LBS | HEIGHT: 72 IN

## 2024-12-19 LAB
ALBUMIN SERPL ELPH-MCNC: 3.3 G/DL — SIGNIFICANT CHANGE UP (ref 3.3–5)
ALP SERPL-CCNC: 64 U/L — SIGNIFICANT CHANGE UP (ref 40–120)
ALT FLD-CCNC: 23 U/L — SIGNIFICANT CHANGE UP (ref 10–45)
ANION GAP SERPL CALC-SCNC: 13 MMOL/L — SIGNIFICANT CHANGE UP (ref 5–17)
ANISOCYTOSIS BLD QL: SLIGHT — SIGNIFICANT CHANGE UP
APTT BLD: 32.1 SEC — SIGNIFICANT CHANGE UP (ref 24.5–35.6)
AST SERPL-CCNC: 75 U/L — HIGH (ref 10–40)
BASOPHILS # BLD AUTO: 0 K/UL — SIGNIFICANT CHANGE UP (ref 0–0.2)
BASOPHILS NFR BLD AUTO: 0 % — SIGNIFICANT CHANGE UP (ref 0–2)
BILIRUB SERPL-MCNC: 0.7 MG/DL — SIGNIFICANT CHANGE UP (ref 0.2–1.2)
BUN SERPL-MCNC: 21 MG/DL — SIGNIFICANT CHANGE UP (ref 7–23)
BURR CELLS BLD QL SMEAR: PRESENT — SIGNIFICANT CHANGE UP
CALCIUM SERPL-MCNC: 8.6 MG/DL — SIGNIFICANT CHANGE UP (ref 8.4–10.5)
CHLORIDE SERPL-SCNC: 98 MMOL/L — SIGNIFICANT CHANGE UP (ref 96–108)
CO2 SERPL-SCNC: 20 MMOL/L — LOW (ref 22–31)
CREAT SERPL-MCNC: 1.19 MG/DL — SIGNIFICANT CHANGE UP (ref 0.5–1.3)
EGFR: 58 ML/MIN/1.73M2 — LOW
EOSINOPHIL # BLD AUTO: 0 K/UL — SIGNIFICANT CHANGE UP (ref 0–0.5)
EOSINOPHIL NFR BLD AUTO: 0 % — SIGNIFICANT CHANGE UP (ref 0–6)
FLUAV AG NPH QL: SIGNIFICANT CHANGE UP
FLUBV AG NPH QL: SIGNIFICANT CHANGE UP
GAS PNL BLDV: SIGNIFICANT CHANGE UP
GLUCOSE SERPL-MCNC: 132 MG/DL — HIGH (ref 70–99)
HCT VFR BLD CALC: 35.3 % — LOW (ref 39–50)
HGB BLD-MCNC: 11.2 G/DL — LOW (ref 13–17)
HYPOCHROMIA BLD QL: SLIGHT — SIGNIFICANT CHANGE UP
INR BLD: 1.55 RATIO — HIGH (ref 0.85–1.16)
LIDOCAIN IGE QN: 28 U/L — SIGNIFICANT CHANGE UP (ref 7–60)
LYMPHOCYTES # BLD AUTO: 0.54 K/UL — LOW (ref 1–3.3)
LYMPHOCYTES # BLD AUTO: 6.1 % — LOW (ref 13–44)
MACROCYTES BLD QL: SLIGHT — SIGNIFICANT CHANGE UP
MAGNESIUM SERPL-MCNC: 2.2 MG/DL — SIGNIFICANT CHANGE UP (ref 1.6–2.6)
MANUAL SMEAR VERIFICATION: SIGNIFICANT CHANGE UP
MCHC RBC-ENTMCNC: 30.2 PG — SIGNIFICANT CHANGE UP (ref 27–34)
MCHC RBC-ENTMCNC: 31.7 G/DL — LOW (ref 32–36)
MCV RBC AUTO: 95.1 FL — SIGNIFICANT CHANGE UP (ref 80–100)
MONOCYTES # BLD AUTO: 1.01 K/UL — HIGH (ref 0–0.9)
MONOCYTES NFR BLD AUTO: 11.4 % — SIGNIFICANT CHANGE UP (ref 2–14)
NEUTROPHILS # BLD AUTO: 7.28 K/UL — SIGNIFICANT CHANGE UP (ref 1.8–7.4)
NEUTROPHILS NFR BLD AUTO: 81.6 % — HIGH (ref 43–77)
NEUTS BAND # BLD: 0.9 % — SIGNIFICANT CHANGE UP (ref 0–8)
NT-PROBNP SERPL-SCNC: HIGH PG/ML (ref 0–300)
OVALOCYTES BLD QL SMEAR: SLIGHT — SIGNIFICANT CHANGE UP
PHOSPHATE SERPL-MCNC: 3.6 MG/DL — SIGNIFICANT CHANGE UP (ref 2.5–4.5)
PLAT MORPH BLD: NORMAL — SIGNIFICANT CHANGE UP
PLATELET # BLD AUTO: 350 K/UL — SIGNIFICANT CHANGE UP (ref 150–400)
POIKILOCYTOSIS BLD QL AUTO: SLIGHT — SIGNIFICANT CHANGE UP
POLYCHROMASIA BLD QL SMEAR: SLIGHT — SIGNIFICANT CHANGE UP
POTASSIUM SERPL-MCNC: 5.5 MMOL/L — HIGH (ref 3.5–5.3)
POTASSIUM SERPL-SCNC: 5.5 MMOL/L — HIGH (ref 3.5–5.3)
PROT SERPL-MCNC: 7.4 G/DL — SIGNIFICANT CHANGE UP (ref 6–8.3)
PROTHROM AB SERPL-ACNC: 17.7 SEC — HIGH (ref 9.9–13.4)
RBC # BLD: 3.71 M/UL — LOW (ref 4.2–5.8)
RBC # FLD: 21.2 % — HIGH (ref 10.3–14.5)
RBC BLD AUTO: ABNORMAL
RSV RNA NPH QL NAA+NON-PROBE: SIGNIFICANT CHANGE UP
SARS-COV-2 RNA SPEC QL NAA+PROBE: SIGNIFICANT CHANGE UP
SODIUM SERPL-SCNC: 131 MMOL/L — LOW (ref 135–145)
TROPONIN T, HIGH SENSITIVITY RESULT: 178 NG/L — HIGH (ref 0–51)
TROPONIN T, HIGH SENSITIVITY RESULT: 199 NG/L — HIGH (ref 0–51)
WBC # BLD: 8.82 K/UL — SIGNIFICANT CHANGE UP (ref 3.8–10.5)
WBC # FLD AUTO: 8.82 K/UL — SIGNIFICANT CHANGE UP (ref 3.8–10.5)

## 2024-12-19 PROCEDURE — 99285 EMERGENCY DEPT VISIT HI MDM: CPT | Mod: GC

## 2024-12-19 PROCEDURE — 71045 X-RAY EXAM CHEST 1 VIEW: CPT | Mod: 26

## 2024-12-19 PROCEDURE — 70450 CT HEAD/BRAIN W/O DYE: CPT | Mod: 26,MC

## 2024-12-19 PROCEDURE — 93010 ELECTROCARDIOGRAM REPORT: CPT

## 2024-12-19 PROCEDURE — 74177 CT ABD & PELVIS W/CONTRAST: CPT | Mod: 26,MC

## 2024-12-19 PROCEDURE — 71275 CT ANGIOGRAPHY CHEST: CPT | Mod: 26,MC

## 2024-12-19 RX ORDER — SODIUM CHLORIDE 9 MG/ML
1000 INJECTION, SOLUTION INTRAMUSCULAR; INTRAVENOUS; SUBCUTANEOUS ONCE
Refills: 0 | Status: COMPLETED | OUTPATIENT
Start: 2024-12-19 | End: 2024-12-19

## 2024-12-19 RX ORDER — ACETAMINOPHEN 80 MG/.8ML
650 SOLUTION/ DROPS ORAL ONCE
Refills: 0 | Status: DISCONTINUED | OUTPATIENT
Start: 2024-12-19 | End: 2024-12-19

## 2024-12-19 RX ADMIN — SODIUM CHLORIDE 1000 MILLILITER(S): 9 INJECTION, SOLUTION INTRAMUSCULAR; INTRAVENOUS; SUBCUTANEOUS at 20:42

## 2024-12-19 NOTE — ED PROVIDER NOTE - PROGRESS NOTE DETAILS
Attending MD Tejeda: Patient's CT scan notable for distended gallbladder with wall thickening and cholelithiasis.  Some pericholecystic stranding as well.  Reassessed patient and he he is a limited historian however he does have some tenderness to palpation throughout the right side of the abdomen more so on the right upper quadrant so acute cholecystitis is a possibility.  Plan at this time will be for right upper quadrant ultrasound, initiate IV antibiotics.  Will consider general surgery consultation after ultrasound

## 2024-12-19 NOTE — ED PROVIDER NOTE - OBJECTIVE STATEMENT
This is a 89-year-old gentleman recently diagnosed with pulmonary embolism complicated by right thigh hematoma and was admitted to the hospital at [facility].  He has advanced dementia and it is from Alzheimer's and hypertension and his daughter is giving the history.  I also received history from EMS.  They reported to me that he was hypotensive on route 70 systolic.  He has been vomiting all day today.  He has not walked since August when he was hospitalized and is contracted ever since then.  He has waxing and waning mental status over the course of the day for weeks to months.  Patient at this time is barely arousable and the daughter insists that this is not far off from his baseline. -Barrington Gibson MD-

## 2024-12-19 NOTE — ED ADULT NURSE NOTE - OBJECTIVE STATEMENT
89y M currently non-verbal was brought in by EMS for multiple episodes of vomiting. Patient's daughter is present at bedside and is acting as historian. Per patient's daughter patient was discharged last week from [facility] after receiving treatment for pulmonary embolism. Patient was told when discharged if he experienced any episodes of vomiting to come to the hospital for further evaluation. Patient's daughter reports that earlier today patient had multiple episodes of vomiting that appeared to be food particles. Patient also appears more lethargic and tired than his baseline. Per daughter patient is normally oriented to self, best time for orientation is in the afternoon. Patient does not ambulate due to prior hospitalization that caused knees to be contracted. PMH of weakened heart. Denies fever, diarrhea, dysuria, hematuria, coffee-ground emesis, black tarry stool, chest pain, and SOB. Daughter is present at bedside. Bed is in lowest position.

## 2024-12-19 NOTE — ED ADULT NURSE NOTE - NSFALLHARMRISKINTERV_ED_ALL_ED

## 2024-12-19 NOTE — ED PROVIDER NOTE - PHYSICAL EXAMINATION
Patient is barely arousable contracted in the lower extremities 2 mm pupils clear lungs regular rate and rhythm abdomen appears tender though it does not seem focal and it is unclear if it is actually tender or if he is just moaning to stimulation.  He has discomfort and moans when I cause pain to his 4 extremities however he does not move them the pain.  Of the thigh hematoma appears to be essentially evacuated though his thigh is slightly hard.

## 2024-12-19 NOTE — ED PROVIDER NOTE - CLINICAL SUMMARY MEDICAL DECISION MAKING FREE TEXT BOX
Concerning for intracranial hemorrhage versus intra-abdominal pathology.  However I do not have any imaging regarding the pulmonary embolism and I went through the patient's chart on the patient's daughter's phone and CTA was not available there.  Will repeat the CTA chest for that reason.  CBC CMP reassess.–Barrington Gibson

## 2024-12-20 DIAGNOSIS — G30.9 ALZHEIMER'S DISEASE, UNSPECIFIED: ICD-10-CM

## 2024-12-20 DIAGNOSIS — K81.0 ACUTE CHOLECYSTITIS: ICD-10-CM

## 2024-12-20 DIAGNOSIS — E78.49 OTHER HYPERLIPIDEMIA: ICD-10-CM

## 2024-12-20 DIAGNOSIS — I50.21 ACUTE SYSTOLIC (CONGESTIVE) HEART FAILURE: ICD-10-CM

## 2024-12-20 DIAGNOSIS — I10 ESSENTIAL (PRIMARY) HYPERTENSION: ICD-10-CM

## 2024-12-20 DIAGNOSIS — I26.99 OTHER PULMONARY EMBOLISM WITHOUT ACUTE COR PULMONALE: ICD-10-CM

## 2024-12-20 LAB
ALBUMIN SERPL ELPH-MCNC: 3.9 G/DL — SIGNIFICANT CHANGE UP (ref 3.3–5)
ALP SERPL-CCNC: 79 U/L — SIGNIFICANT CHANGE UP (ref 40–120)
ALT FLD-CCNC: 18 U/L — SIGNIFICANT CHANGE UP (ref 10–45)
ANION GAP SERPL CALC-SCNC: 19 MMOL/L — HIGH (ref 5–17)
AST SERPL-CCNC: 23 U/L — SIGNIFICANT CHANGE UP (ref 10–40)
BASOPHILS # BLD AUTO: 0.02 K/UL — SIGNIFICANT CHANGE UP (ref 0–0.2)
BASOPHILS NFR BLD AUTO: 0.3 % — SIGNIFICANT CHANGE UP (ref 0–2)
BILIRUB SERPL-MCNC: 0.8 MG/DL — SIGNIFICANT CHANGE UP (ref 0.2–1.2)
BUN SERPL-MCNC: 124 MG/DL — HIGH (ref 7–23)
CALCIUM SERPL-MCNC: 9 MG/DL — SIGNIFICANT CHANGE UP (ref 8.4–10.5)
CHLORIDE SERPL-SCNC: 96 MMOL/L — SIGNIFICANT CHANGE UP (ref 96–108)
CO2 SERPL-SCNC: 22 MMOL/L — SIGNIFICANT CHANGE UP (ref 22–31)
CREAT SERPL-MCNC: 2.87 MG/DL — HIGH (ref 0.5–1.3)
EGFR: 20 ML/MIN/1.73M2 — LOW
EOSINOPHIL # BLD AUTO: 0.13 K/UL — SIGNIFICANT CHANGE UP (ref 0–0.5)
EOSINOPHIL NFR BLD AUTO: 1.7 % — SIGNIFICANT CHANGE UP (ref 0–6)
GAS PNL BLDV: SIGNIFICANT CHANGE UP
GLUCOSE SERPL-MCNC: 91 MG/DL — SIGNIFICANT CHANGE UP (ref 70–99)
HCT VFR BLD CALC: 35.2 % — LOW (ref 39–50)
HGB BLD-MCNC: 10.5 G/DL — LOW (ref 13–17)
IMM GRANULOCYTES NFR BLD AUTO: 0.8 % — SIGNIFICANT CHANGE UP (ref 0–0.9)
LYMPHOCYTES # BLD AUTO: 0.53 K/UL — LOW (ref 1–3.3)
LYMPHOCYTES # BLD AUTO: 6.9 % — LOW (ref 13–44)
MCHC RBC-ENTMCNC: 25.3 PG — LOW (ref 27–34)
MCHC RBC-ENTMCNC: 29.8 G/DL — LOW (ref 32–36)
MCV RBC AUTO: 84.8 FL — SIGNIFICANT CHANGE UP (ref 80–100)
MONOCYTES # BLD AUTO: 0.97 K/UL — HIGH (ref 0–0.9)
MONOCYTES NFR BLD AUTO: 12.7 % — SIGNIFICANT CHANGE UP (ref 2–14)
NEUTROPHILS # BLD AUTO: 5.95 K/UL — SIGNIFICANT CHANGE UP (ref 1.8–7.4)
NEUTROPHILS NFR BLD AUTO: 77.6 % — HIGH (ref 43–77)
NRBC # BLD: 0 /100 WBCS — SIGNIFICANT CHANGE UP (ref 0–0)
NT-PROBNP SERPL-SCNC: HIGH PG/ML (ref 0–300)
PLATELET # BLD AUTO: 145 K/UL — LOW (ref 150–400)
POTASSIUM SERPL-MCNC: 3 MMOL/L — LOW (ref 3.5–5.3)
POTASSIUM SERPL-SCNC: 3 MMOL/L — LOW (ref 3.5–5.3)
PROT SERPL-MCNC: 6.5 G/DL — SIGNIFICANT CHANGE UP (ref 6–8.3)
RBC # BLD: 4.15 M/UL — LOW (ref 4.2–5.8)
RBC # FLD: 22.7 % — HIGH (ref 10.3–14.5)
SODIUM SERPL-SCNC: 137 MMOL/L — SIGNIFICANT CHANGE UP (ref 135–145)
WBC # BLD: 7.66 K/UL — SIGNIFICANT CHANGE UP (ref 3.8–10.5)
WBC # FLD AUTO: 7.66 K/UL — SIGNIFICANT CHANGE UP (ref 3.8–10.5)

## 2024-12-20 PROCEDURE — 78227 HEPATOBIL SYST IMAGE W/DRUG: CPT | Mod: 26

## 2024-12-20 PROCEDURE — 99221 1ST HOSP IP/OBS SF/LOW 40: CPT

## 2024-12-20 PROCEDURE — 99223 1ST HOSP IP/OBS HIGH 75: CPT | Mod: AI

## 2024-12-20 PROCEDURE — 99222 1ST HOSP IP/OBS MODERATE 55: CPT | Mod: FS

## 2024-12-20 PROCEDURE — 76705 ECHO EXAM OF ABDOMEN: CPT | Mod: 26

## 2024-12-20 RX ORDER — CARVEDILOL 25 MG/1
12.5 TABLET, FILM COATED ORAL EVERY 12 HOURS
Refills: 0 | Status: DISCONTINUED | OUTPATIENT
Start: 2024-12-20 | End: 2024-12-25

## 2024-12-20 RX ORDER — SODIUM HYPOCHLORITE 0.057 %
1 SOLUTION, IRRIGATION IRRIGATION
Refills: 0 | Status: DISCONTINUED | OUTPATIENT
Start: 2024-12-20 | End: 2024-12-25

## 2024-12-20 RX ORDER — LEVOTHYROXINE SODIUM 175 UG/1
112 TABLET ORAL DAILY
Refills: 0 | Status: DISCONTINUED | OUTPATIENT
Start: 2024-12-20 | End: 2024-12-25

## 2024-12-20 RX ORDER — ENALAPRIL MALEATE 10 MG/1
20 TABLET ORAL DAILY
Refills: 0 | Status: DISCONTINUED | OUTPATIENT
Start: 2024-12-20 | End: 2024-12-25

## 2024-12-20 RX ORDER — PIPERACILLIN AND TAZOBACTAM 3; .375 G/15ML; G/15ML
3.38 INJECTION, POWDER, LYOPHILIZED, FOR SOLUTION INTRAVENOUS EVERY 8 HOURS
Refills: 0 | Status: DISCONTINUED | OUTPATIENT
Start: 2024-12-20 | End: 2024-12-25

## 2024-12-20 RX ORDER — MEMANTINE HYDROCHLORIDE 14 MG/1
10 CAPSULE, EXTENDED RELEASE ORAL
Refills: 0 | Status: DISCONTINUED | OUTPATIENT
Start: 2024-12-20 | End: 2024-12-25

## 2024-12-20 RX ORDER — ASPIRIN 81 MG
81 TABLET, DELAYED RELEASE (ENTERIC COATED) ORAL DAILY
Refills: 0 | Status: DISCONTINUED | OUTPATIENT
Start: 2024-12-20 | End: 2024-12-20

## 2024-12-20 RX ORDER — ATORVASTATIN CALCIUM 40 MG/1
20 TABLET, FILM COATED ORAL AT BEDTIME
Refills: 0 | Status: DISCONTINUED | OUTPATIENT
Start: 2024-12-20 | End: 2024-12-25

## 2024-12-20 RX ORDER — CHOLECALCIFEROL (VITAMIN D3) 10 MCG
5000 TABLET ORAL
Refills: 0 | Status: DISCONTINUED | OUTPATIENT
Start: 2024-12-20 | End: 2024-12-21

## 2024-12-20 RX ORDER — PIPERACILLIN AND TAZOBACTAM 3; .375 G/15ML; G/15ML
3.38 INJECTION, POWDER, LYOPHILIZED, FOR SOLUTION INTRAVENOUS ONCE
Refills: 0 | Status: COMPLETED | OUTPATIENT
Start: 2024-12-20 | End: 2024-12-20

## 2024-12-20 RX ORDER — FUROSEMIDE 20 MG
20 TABLET ORAL DAILY
Refills: 0 | Status: DISCONTINUED | OUTPATIENT
Start: 2024-12-20 | End: 2024-12-21

## 2024-12-20 RX ORDER — SODIUM CHLORIDE 9 MG/ML
500 INJECTION, SOLUTION INTRAVENOUS
Refills: 0 | Status: DISCONTINUED | OUTPATIENT
Start: 2024-12-20 | End: 2024-12-21

## 2024-12-20 RX ADMIN — PIPERACILLIN AND TAZOBACTAM 200 GRAM(S): 3; .375 INJECTION, POWDER, LYOPHILIZED, FOR SOLUTION INTRAVENOUS at 15:45

## 2024-12-20 RX ADMIN — Medication 1 APPLICATION(S): at 19:00

## 2024-12-20 RX ADMIN — PIPERACILLIN AND TAZOBACTAM 25 GRAM(S): 3; .375 INJECTION, POWDER, LYOPHILIZED, FOR SOLUTION INTRAVENOUS at 23:15

## 2024-12-20 RX ADMIN — PIPERACILLIN AND TAZOBACTAM 200 GRAM(S): 3; .375 INJECTION, POWDER, LYOPHILIZED, FOR SOLUTION INTRAVENOUS at 02:19

## 2024-12-20 NOTE — CONSULT NOTE ADULT - SUBJECTIVE AND OBJECTIVE BOX
DATE OF SERVICE: 12-20-24      CHIEF COMPLAINT:Patient is a 89y old  Male who presents with a chief complaint of nausea, vomiting (20 Dec 2024 13:56)      HISTORY OF PRESENT ILLNESS:HPI:  Information obtained from chart review as patient with advanced dementia and I have not been able to reach daughter Larisa    Patient is a 89 year old male with advanced Alzheimer's dementia, hx of thyroid cancer s/p thyroidectomy, prostate cancer s/p prostatectomy, HFrEF, HTN, HLD  presents from home with  nausea, vomiting and poor PO intake x 1 day. 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 Currently patient is AAOX0, I am not able to obtain any history and this appears to be his baseline mentation.    (20 Dec 2024 11:14)      PAST MEDICAL & SURGICAL HISTORY:  Dementia      HTN (hypertension)      HLD (hyperlipidemia)      Thyroid cancer      Prostate cancer      S/P thyroidectomy  Total in 1980      S/P prostatectomy  2004              MEDICATIONS:  carvedilol 12.5 milliGRAM(s) Oral every 12 hours  enalapril 20 milliGRAM(s) Oral daily  furosemide    Tablet 20 milliGRAM(s) Oral daily    piperacillin/tazobactam IVPB.. 3.375 Gram(s) IV Intermittent every 8 hours      memantine 10 milliGRAM(s) Oral two times a day      atorvastatin 20 milliGRAM(s) Oral at bedtime  levothyroxine 112 MICROGram(s) Oral daily    cholecalciferol 5000 Unit(s) Oral <User Schedule>  Dakins Solution - 1/4 Strength 1 Application(s) Topical two times a day      FAMILY HISTORY:  Family history of pancreatic cancer (Child)        Non-contributory    SOCIAL HISTORY:    [ ] not a smoker    Allergies    No Known Allergies    Intolerances    	    REVIEW OF SYSTEMS:  Unable to obtain 2/2 mental status    All other ROS negative    PHYSICAL EXAM:  T(C): 36.7 (12-20-24 @ 20:47), Max: 37.1 (12-20-24 @ 11:40)  HR: 94 (12-20-24 @ 20:47) (62 - 94)  BP: 103/67 (12-20-24 @ 20:47) (103/67 - 143/74)  RR: 17 (12-20-24 @ 20:47) (16 - 18)  SpO2: 99% (12-20-24 @ 20:47) (97% - 99%)  Wt(kg): --  I&O's Summary      Appearance: Normal	  HEENT:   Normal oral mucosa, EOMI	  Cardiovascular:  S1 S2, No JVD,    Respiratory: Lungs clear to auscultation	  Psychiatry: Alert  Gastrointestinal:  Soft, Non-tender, + BS	  Skin: No rashes   Neurologic: Non-focal  Extremities:  No edema  Vascular: Peripheral pulses palpable    	    	  	  CARDIAC MARKERS:  Labs personally reviewed by me                                  10.5   7.66  )-----------( 145      ( 20 Dec 2024 14:23 )             35.2     12-20    137  |  96  |  124[H]  ----------------------------<  91  3.0[L]   |  22  |  2.87[H]    Ca    9.0      20 Dec 2024 14:23  Phos  3.6     12-19  Mg     2.2     12-19    TPro  6.5  /  Alb  3.9  /  TBili  0.8  /  DBili  x   /  AST  23  /  ALT  18  /  AlkPhos  79  12-20          EKG: Personally reviewed by me - NSR LBBB  Radiology: Personally reviewed by me - CTA chest CTPA: Suboptimal study for evaluation of the left lower lobe pulmonary vasculature. Otherwise, no pulmonary embolism. Small bilateral pleural effusions, left greater than right.       TTE CONCLUSIONS:      1. Left ventricular cavity is normal in size. Left ventricular wall thickness is normal. Left ventricular systolic function is severely decreased with an ejection fraction visually estimated at 30 %. Global left ventricular hypokinesis.   2. Elevated left ventricular filling pressure.   3. Moderately enlarged right ventricular cavity size and moderately reduced right ventricular systolic function.   4. Moderate to severe mitral regurgitation.   5. Mild to moderate pulmonary hypertension.        Assessment/Plan:  	  Patient is a 89 year old male with advanced Alzheimer's dementia, hx of thyroid cancer s/p thyroidectomy, prostate cancer s/p prostatectomy, HFrEF, HTN, HLD  presents from home with  nausea, vomiting and poor PO intake x 1 day admitted for acute cholecystitis awaiting perc ulises drain.       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   ·  Plan:   CXR with small b/l pleural effusions  - pro bnp 30k from 2800 last month a 10 fold increase  - However not hypoxic on RA and comfortable   - Change to lasix 20 mg IV daily pending repeat Cr given acute RUSS likely 2/2 JANET  - GDMT with Enalapril 20mg PO daily and Coreg 12.5mg BID  - Add Aldactone pending resolution of RUSS  - Jardiance avoided 2/2 urine incontinence with diaper    Problem/Plan - 2:  ·  Problem: Hypertension.   ·  Plan: - bp stable   - c/w enalapril and coreg.  - May need to hold Enalapril if RUSS worsens    Problem/Plan - 3:  ·  Problem: Other hyperlipidemia.   ·  Plan: - c/w statin.    Problem/Plan - 4:  ·  Problem: RUSS  - Likely JANET from multiple contrast studies  - Trend Cr          Differential diagnosis and plan of care discussed with patient after the evaluation. Counseling on diet, nutritional counseling, weight management, exercise and medication compliance was done.   Advanced care planning/advanced directives discussed with patient/family. DNR status including forceful chest compressions to attempt to restart the heart, ventilator support/artificial breathing, electric shock, artificial nutrition, health care proxy, Molst form all discussed with pt. Pt wishes to consider. Sixteen minutes spent on discussing advanced directives.          Conner Valdivia DO [facility]  Cardiovascular Medicine  [address], [address]  Office [phone]  Available via call/text on Microsoft Teams

## 2024-12-20 NOTE — CONSULT NOTE ADULT - SUBJECTIVE AND OBJECTIVE BOX
Interventional Radiology    Evaluate for Procedure: Percutaneous cholecystostomy tube placement    HPI:   89y.o. male with thyroid CA s/p thyroidectomy prostate CA s/p prostatectomy p/t ED for 1 day nausea, vomiting with poor PO intake. Per daughter, pt was d/c'd from [facility] last Friday, started on eliquis for DVT/PE and was told to report to hospital if n/v. Concerned if it might b related to medications. Denies fever, chills, CP, SOB.   He has advanced dementia and it is from Alzheimer's and hypertension and his daughter is giving the history.  I also received history from EMS.  They reported to me that he was hypotensive on route 70 systolic.  He has been vomiting all day today.  He has not walked since August when he was hospitalized and is contracted ever since then.  He has waxing and waning mental status over the course of the day for weeks to months.  Patient at this time is barely arousable and the daughter insists that this is not far off from his baseline.    Allergies: No Known Allergies    Medications (Abx/Cardiac/Anticoagulation/Blood Products)  piperacillin/tazobactam IVPB...: 200 mL/Hr IV Intermittent (12-20 @ 02:19)    Data:  182.9  72.6  T(C): 36.7  HR: 67  BP: 143/74  RR: 18  SpO2: 97%    -WBC 8.82 / HgB 11.2 / Hct 35.3 / Plt 350  -Na 131 / Cl 98 / BUN 21 / Glucose 132  -K 5.5 / CO2 20 / Cr 1.19  -ALT 23 / Alk Phos 64 / T.Bili 0.7  -INR 1.55 / PTT 32.1    Radiology:     < from: US Abdomen Upper Quadrant Right (12.20.24 @ 04:39) >  FINDINGS:  Liver: Within normal limits.  Bile ducts: Normal caliber. Common bile duct measures 6 mm.  Gallbladder: Distended with sludge and stones, most of which is in the   gallbladder neck. Gallbladder wall thickening and trace pericholecystic   fluid. Sonographic Bañuelos's sign reported as negative, however, patient   was premedicated prior to the exam.  Pancreas: Not well visualized. Right kidney: 11.8 cm. No hydronephrosis.   4.2 cm cyst.  Ascites: None.  IVC: Visualized portions are within normal limits.    IMPRESSION:  Distended gallbladder with stones in the neck, wall thickening and trace   pericholecystic fluid. Unreliable sonographic Bañuelos sign. Findings   remain suspicious for acute cholecystitis.    < end of copied text >  < from: CT Abdomen and Pelvis w/ IV Cont (12.19.24 @ 22:50) >  FINDINGS:  CHEST:  LUNGS AND LARGE AIRWAYS: Patent central airways. No pulmonary nodules. No   consolidation. Partial compressive atelectasis of the lung bases.  PLEURA: Small bilateral pleural effusions, greater on the left.  VESSELS: Limited evaluation of some subsegmental branches of the LLL due   to poor distal opacification related to mixing artifacts and perfusion   differential likely from chronic heart failure. Within those limitation   no pulmonary embolism.  HEART: Cardiomegaly. No pericardial effusion.  MEDIASTINUM AND NERISSA: No lymphadenopathy.  CHEST WALL AND LOWER NECK: Subcutaneous edema left lower chest wall.    ABDOMEN AND PELVIS:  LIVER: Within normal limits.  BILE DUCTS: Normal caliber.  GALLBLADDER: Distended gallbladder with wall thickening and   cholelithiasis. Stranding in the pericholecystic fat suggested, however,   limited by motion and streak artifact from patient's positioning with   arms at the side.  SPLEEN: Within normal limits.  PANCREAS: Within normal limits.  ADRENALS: Within normal limits.  KIDNEYS/URETERS: No renal stones or hydronephrosis. Right renal cyst and   subcentimeter hypodensities too small to characterize    BLADDER: Mild bladder wall thickening, likely related to chronic   obstruction given prostatectomy, correlate forcystitis.  REPRODUCTIVE ORGANS: Prostatectomy.    BOWEL: No bowel obstruction. Appendix is normal. Moderate fecal burden in   the colon. Moderately distended fecal filled rectum without significant   wall thickening. Perirectal fat stranding.  Large rectal stool.  PERITONEUM/RETROPERITONEUM: Within normal limits.  VESSELS: Atherosclerotic changes.  LYMPH NODES: No lymphadenopathy.  ABDOMINAL WALL: Anasarca. BONES: Degenerative changes.    IMPRESSION:  CTPA: Suboptimal study for evaluation of the left lower lobe pulmonary   vasculature. Otherwise, no pulmonary embolism.  Small bilateral pleural effusions, left greater than right.    Abdomen/pelvis: No bowel obstruction. Gallbladder findings suspicious for   acute cholecystitis.  Moderately distended fecal filled rectum with perirectal fat stranding   which could be due to presacral edema. No wall thickening. Stercoral   colitis should be excluded on a clinical basis.    < end of copied text >    Assessment/Plan:   89-year-old Male with advanced Alzheimer's dementia, HTN and recently diagnosed with pulmonary embolism (was started on eliquis) complicated by right thigh hematoma and was admitted to the hospital at [facility], thyroid CA s/p thyroidectomy prostate CA s/p prostatectomy p/t ED for 1 day nausea, vomiting with poor PO intake.    Per EMS patient was hypotensive in route however has been hemodynamically stable since arrival. No leukocytosis or fevers, started on Zosyn.  CT A/P and US revealing distended gallbladder with wall thickening and cholelithiasis and trace pericholecystic fluid suspicious for acute cholecystitis.    Surgery consulted: "Not a surgical candidate at this time d/t poor optimization for surgery and not within goals of care per daughter."     -Case discussed and reviewed with MD [place]  -Please continue current medical management at this time, please notify IR if any changes in clinical status  -Please find out and document last dose of eliquis and ASA - and continue to hold and AC for potential future intervention  -IR will continue to follow  -d/w primary team Interventional Radiology    Evaluate for Procedure: Percutaneous cholecystostomy tube placement    HPI:   89y.o. male with thyroid CA s/p thyroidectomy prostate CA s/p prostatectomy p/t ED for 1 day nausea, vomiting with poor PO intake. Per daughter, pt was d/c'd from [facility] last Friday, started on eliquis for DVT/PE and was told to report to hospital if n/v. Concerned if it might b related to medications. Denies fever, chills, CP, SOB.   He has advanced dementia and it is from Alzheimer's and hypertension and his daughter is giving the history.  I also received history from EMS.  They reported to me that he was hypotensive on route 70 systolic.  He has been vomiting all day today.  He has not walked since August when he was hospitalized and is contracted ever since then.  He has waxing and waning mental status over the course of the day for weeks to months.  Patient at this time is barely arousable and the daughter insists that this is not far off from his baseline.    Allergies: No Known Allergies    Medications (Abx/Cardiac/Anticoagulation/Blood Products)  piperacillin/tazobactam IVPB...: 200 mL/Hr IV Intermittent (12-20 @ 02:19)    Data:  182.9  72.6  T(C): 36.7  HR: 67  BP: 143/74  RR: 18  SpO2: 97%    -WBC 8.82 / HgB 11.2 / Hct 35.3 / Plt 350  -Na 131 / Cl 98 / BUN 21 / Glucose 132  -K 5.5 / CO2 20 / Cr 1.19  -ALT 23 / Alk Phos 64 / T.Bili 0.7  -INR 1.55 / PTT 32.1    Radiology:     < from: US Abdomen Upper Quadrant Right (12.20.24 @ 04:39) >  FINDINGS:  Liver: Within normal limits.  Bile ducts: Normal caliber. Common bile duct measures 6 mm.  Gallbladder: Distended with sludge and stones, most of which is in the   gallbladder neck. Gallbladder wall thickening and trace pericholecystic   fluid. Sonographic Bañuelos's sign reported as negative, however, patient   was premedicated prior to the exam.  Pancreas: Not well visualized. Right kidney: 11.8 cm. No hydronephrosis.   4.2 cm cyst.  Ascites: None.  IVC: Visualized portions are within normal limits.    IMPRESSION:  Distended gallbladder with stones in the neck, wall thickening and trace   pericholecystic fluid. Unreliable sonographic Bañuelos sign. Findings   remain suspicious for acute cholecystitis.    < end of copied text >  < from: CT Abdomen and Pelvis w/ IV Cont (12.19.24 @ 22:50) >  FINDINGS:  CHEST:  LUNGS AND LARGE AIRWAYS: Patent central airways. No pulmonary nodules. No   consolidation. Partial compressive atelectasis of the lung bases.  PLEURA: Small bilateral pleural effusions, greater on the left.  VESSELS: Limited evaluation of some subsegmental branches of the LLL due   to poor distal opacification related to mixing artifacts and perfusion   differential likely from chronic heart failure. Within those limitation   no pulmonary embolism.  HEART: Cardiomegaly. No pericardial effusion.  MEDIASTINUM AND NERISSA: No lymphadenopathy.  CHEST WALL AND LOWER NECK: Subcutaneous edema left lower chest wall.    ABDOMEN AND PELVIS:  LIVER: Within normal limits.  BILE DUCTS: Normal caliber.  GALLBLADDER: Distended gallbladder with wall thickening and   cholelithiasis. Stranding in the pericholecystic fat suggested, however,   limited by motion and streak artifact from patient's positioning with   arms at the side.  SPLEEN: Within normal limits.  PANCREAS: Within normal limits.  ADRENALS: Within normal limits.  KIDNEYS/URETERS: No renal stones or hydronephrosis. Right renal cyst and   subcentimeter hypodensities too small to characterize    BLADDER: Mild bladder wall thickening, likely related to chronic   obstruction given prostatectomy, correlate forcystitis.  REPRODUCTIVE ORGANS: Prostatectomy.    BOWEL: No bowel obstruction. Appendix is normal. Moderate fecal burden in   the colon. Moderately distended fecal filled rectum without significant   wall thickening. Perirectal fat stranding.  Large rectal stool.  PERITONEUM/RETROPERITONEUM: Within normal limits.  VESSELS: Atherosclerotic changes.  LYMPH NODES: No lymphadenopathy.  ABDOMINAL WALL: Anasarca. BONES: Degenerative changes.    IMPRESSION:  CTPA: Suboptimal study for evaluation of the left lower lobe pulmonary   vasculature. Otherwise, no pulmonary embolism.  Small bilateral pleural effusions, left greater than right.    Abdomen/pelvis: No bowel obstruction. Gallbladder findings suspicious for   acute cholecystitis.  Moderately distended fecal filled rectum with perirectal fat stranding   which could be due to presacral edema. No wall thickening. Stercoral   colitis should be excluded on a clinical basis.    < end of copied text >    Assessment/Plan:   89-year-old Male with advanced Alzheimer's dementia, HTN and recently diagnosed with pulmonary embolism (was started on eliquis) complicated by right thigh hematoma and was admitted to the hospital at [facility], thyroid CA s/p thyroidectomy prostate CA s/p prostatectomy p/t ED for 1 day nausea, vomiting with poor PO intake.    Per EMS patient was hypotensive in route however has been hemodynamically stable since arrival. No leukocytosis or fevers, started on Zosyn.  CT A/P and US revealing distended gallbladder with wall thickening and cholelithiasis and trace pericholecystic fluid suspicious for acute cholecystitis.    Surgery consulted: "Not a surgical candidate at this time d/t poor optimization for surgery and not within goals of care per daughter."   -primary team states patient without pain.     -Case discussed and reviewed with MD [place]  -Please continue current medical management at this time, please notify IR if any changes in clinical status and consider ICU evaluation if patient with clinical decompinsates  -Last dose of eliquis and ASA per primary team was yesterday - continue to hold and AC for potential future intervention. Given high risk for bleeding, ASA hold for 5 days, Eliquis for 24 hours  -IR will continue to follow  -d/w primary team

## 2024-12-20 NOTE — H&P ADULT - PROBLEM SELECTOR PLAN 2
- baseline appears to be aaox0-1  - c/w memantine daily   - palliative care consulted for GOC as patient is full code, however appears to be hospice appropriate due to advanced dementia and numerous hospitalizations  - I have called daughter HCP Larisa, but she did not . Voicemail left.

## 2024-12-20 NOTE — CONSULT NOTE ADULT - ASSESSMENT
Impression:    Sacral/bilateral Buttocks deep tissue injury present on admission  Right trochanter stage 4 pressure injury present on admission  Left trochanter deep tissue injury present on admission  Right and left heel deep tissue injuries present on admission  Incontinence of bowel and bladder  Incontinence Dermatitis    Recommend:  1.) topical therapy: sacral/buttock wound – cleanse with incontinence cleanser, pat dry, apply Deandre ointment BID and PRN for incontinent episodes  Right trochanter wound - cleanse/irrigate with 1/4 strength Dakins solution, cover with dakins moistened gauze, then DSD, secure with tegederm BID  Left trochanter wound - cleanse with NS, pat dry, apply foam dressing every other day  Right and Left heels - keep clean and dry, offload at all times while in bed  2.) Incontinence Management - incontinence cleanser, pads, pericare BID  3.) Maintain on an alternating air with low air loss surface  4.) Turn and reposition Q 2 hours  5.) Nutrition optimization - please add Sanket  6.) Offload heels/feet with complete cair air fluidized boots/pillows; ensure that the soles of the feet are not resting on the foot board of the bed.  7.) chair cushion for chair sitting    Care as per medicine. Will not actively follow but will remain available. Please recall for new issues or deterioration.  Upon discharge f/u as outpatient at [facility] [address] [place] [address] [phone]  Thank you for this consult  Mari Horn, LUPE-C, CWOCN via TEAMS    Nights/ Weekends/ Holidays please call:  General Surgery Consult pager ([phone]) for emergencies

## 2024-12-20 NOTE — H&P ADULT - PROBLEM SELECTOR PLAN 1
- CT abdomen- Abdomen/pelvis: No bowel obstruction. Gallbladder findings suspicious for acute cholecystitis.  - per surgery, not a surgical candidate at this time d/t poor optimization for surgery and not within goals of care per daughter  - per IR, plan for perc ulises drain once ASA held for 5 days, Eliquis for 24 hours (last dose of eliquis unclear; per chart review it appears it was only started 5-6 days ago for PE, however PE not seen on CTA chest; but likely 12/19 day was last dose); holding aspirin as of 12/20  - start zosyn, started 12/20  - blood cultures x 2 ordered STAT (not sent in the ED)  - repeat labs ordered STAT  - ID consulted

## 2024-12-20 NOTE — H&P ADULT - NSHPADDITIONALINFOADULT_GEN_ALL_CORE
disposition: pending perc ulises, pall care, ID consult  discussed with ACP Daphne Hutson D.O.  Division of Hospital Medicine  Available on MS Teams

## 2024-12-20 NOTE — CONSULT NOTE ADULT - NS ATTEND AMEND GEN_ALL_CORE FT
89-year-old male with Alzheimer's dementia, thyroid cancer status post thyroidectomy, prostate cancer status post prostatectomy, heart failure with reduced ejection fraction, hypertension who was admitted to the hospital due to nausea and vomiting and poor p.o. intake.    Patient with recent discharge from [facility] on 12/13/2024.  Patient found to have DVT/PE at the time and started on apixaban.  Patient at baseline is A and O x 0, unable to obtain further information from patient.    Patient been previously admitted to [facility] in August 2024 where is found to have an abscess involving the right hip.  Patient had completed treatment as planned with outpatient antibiotics.  Patient now admitted with imaging showing distended gallbladder with stones as well as wall thickening and trace pericholecystic fluid suspicious for acute cholecystitis.    Surgery evaluated on admission and determined the patient is not a surgical candidate and IR also without an immediate intervention due to apixaban use.    Since admission, patient has remained afebrile otherwise hemodynamically stable.  Labs show no leukocytosis, anemia 10.5/35.2, thrombocytopenia 145.  BMP with elevated creatinine of 2.8.  Hepatic function within normal limits.  HIDA scan obtained 12/20/2024 shows evidence for acute cholecystitis.    #Abnormal imaging of the abdomen  #Acute cholecystitis  #RUSS    Recommendations  Continue renally dosed piperacillin/tazobactam for now  Follow IR/surgery input  Wound care eval  Follow pending blood cultures  Follow fever curve WBC    Jose Alejandro Hooker MD  Division of Infectious Diseases

## 2024-12-20 NOTE — H&P ADULT - HISTORY OF PRESENT ILLNESS
Information obtained from chart review as patient with advanced dementia and I have not been able to reach daughter Larisa    Patient is a 89 year old male with advanced Alzheimer's dementia, hx of thyroid cancer s/p thyroidectomy, prostate cancer s/p prostatectomy, HFrEF, HTN, HLD  presents from home with  nausea, vomiting and poor PO intake x 1 day. 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 Currently patient is AAOX0, I am not able to obtain any history and this appears to be his baseline mentation.

## 2024-12-20 NOTE — H&P ADULT - PROBLEM SELECTOR PLAN 3
- appears euvolemic, on room air  - CXR with small b/l pleural effusions  - pro bnp >20,000  - resume lasix 20 mg po daily  - cards dr peck consulted, he has seen patient last month at [facility]

## 2024-12-20 NOTE — CONSULT NOTE ADULT - ASSESSMENT
89y/o Hypothyroidism, Alzheimer's disease, vasculitis skin lesion and skin breakdown stage II ulcer over the arm and leg, admitted to [facility] 8/3/2024 to 8/20/2024 due to abscess of RT hip and 9/25/2024 to 9/26/2024 due to problem with vascular access( Picc line displacement), presented to the ED vomiting and waxing mental status. Imaging Distended gallbladder with stones in the neck, wall thickening and trace pericholecystic fluid suspicious for acute choley. Gen surg eval -Not a surgical candidate at this time d/t poor optimization for surgery and not within goals of care per daughter. IR eval with no immediate intervention 2/2 eliquis administration. ID consulted for abx mgt.    Afebrile  WBC wnl  Lactate 2.4 on admission  Zosyn 12/20-->  Imaging:Gallbladder findings suspicious for acute cholecystitis. Moderately distended fecal filled rectum with perirectal fat stranding which could be due to presacral edema.  #Acute choley -no discomfort elicited on exam  #Hyponatremia  #multiple wounds 89y/o Hypothyroidism, Alzheimer's disease, vasculitis skin lesion and skin breakdown stage II ulcer over the arm and leg, admitted to [facility] 8/3/2024 to 8/20/2024 due to abscess of RT hip and 9/25/2024 to 9/26/2024 due to problem with vascular access( Picc line displacement), presented to the ED vomiting and waxing mental status. Imaging Distended gallbladder with stones in the neck, wall thickening and trace pericholecystic fluid suspicious for acute choley. Gen surg eval -Not a surgical candidate at this time d/t poor optimization for surgery and not within goals of care per daughter. IR eval with no immediate intervention 2/2 eliquis administration. ID consulted for abx mgt.    Afebrile  WBC wnl  Lactate 2.4 on admission  Zosyn 12/20-->  Imaging:Gallbladder findings suspicious for acute cholecystitis. Moderately distended fecal filled rectum with perirectal fat stranding which could be due to presacral edema.  #Acute choley -no discomfort elicited on exam  #Hyponatremia  #multiple wounds    Plan  Overall pt with Acute choley pendng IR intervention, multiple wounds does not appear to be infectious.  Suggest Wound care eval  Continue zosyn for now  Continue to trend wbc   Continue to monitor for fever  Please make sure that blood cx are sent  Eventual IR intervention  Plan d/w Dr. Hooker and floor provider

## 2024-12-20 NOTE — H&P ADULT - NSHPLABSRESULTS_GEN_ALL_CORE
.  LABS:                         11.2   8.82  )-----------( 350      ( 19 Dec 2024 20:58 )             35.3     12-19    131[L]  |  98  |  21  ----------------------------<  132[H]  5.5[H]   |  20[L]  |  1.19    Ca    8.6      19 Dec 2024 20:58  Phos  3.6     12-19  Mg     2.2     12-19    TPro  7.4  /  Alb  3.3  /  TBili  0.7  /  DBili  x   /  AST  75[H]  /  ALT  23  /  AlkPhos  64  12-19    PT/INR - ( 19 Dec 2024 20:58 )   PT: 17.7 sec;   INR: 1.55 ratio         PTT - ( 19 Dec 2024 20:58 )  PTT:32.1 sec  Urinalysis Basic - ( 19 Dec 2024 20:58 )    Color: x / Appearance: x / SG: x / pH: x  Gluc: 132 mg/dL / Ketone: x  / Bili: x / Urobili: x   Blood: x / Protein: x / Nitrite: x   Leuk Esterase: x / RBC: x / WBC x   Sq Epi: x / Non Sq Epi: x / Bacteria: x            RADIOLOGY, EKG & ADDITIONAL TESTS: Reviewed.

## 2024-12-20 NOTE — PATIENT PROFILE ADULT - FALL HARM RISK - RISK INTERVENTIONS

## 2024-12-20 NOTE — CONSULT NOTE ADULT - SUBJECTIVE AND OBJECTIVE BOX
Wound Surgery Consult Note:    HPI:  Information obtained from chart review as patient with advanced dementia and I have not been able to reach daughter Larisa    Patient is a 89 year old male with advanced Alzheimer's dementia, hx of thyroid cancer s/p thyroidectomy, prostate cancer s/p prostatectomy, HFrEF, HTN, HLD  presents from home with  nausea, vomiting and poor PO intake x 1 day. 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 Currently patient is AAOX0, I am not able to obtain any history and this appears to be his baseline mentation.  (20 Dec 2024 11:14)    Request for wound care evaluation of the sacrum, bilateral buttocks, bilateral hips and heels received from nursing. Mr. Wong was encountered on an alternating air with low air loss surface. He is grossly incontinent of stool and urine. His immobility, inactivity, incontinence of bowel and bladder in addition to poor nutritional status all contribute to his risk of pressure injury development and hinder healing. Principles of pressure injury prevention including but not limited to turning and positioning reviewed with patient.     PAST MEDICAL & SURGICAL HISTORY:  Dementia  HTN (hypertension)  HLD (hyperlipidemia)  Thyroid cancer  Prostate cancer  S/P thyroidectomy, Total in 1980  S/P prostatectomy, 2004    REVIEW OF SYSTEMS  unable to obtain due to patient's mental status    MEDICATIONS  (STANDING):  atorvastatin 20 milliGRAM(s) Oral at bedtime  carvedilol 12.5 milliGRAM(s) Oral every 12 hours  cholecalciferol 5000 Unit(s) Oral <User Schedule>  enalapril 20 milliGRAM(s) Oral daily  furosemide    Tablet 20 milliGRAM(s) Oral daily  levothyroxine 112 MICROGram(s) Oral daily  memantine 10 milliGRAM(s) Oral two times a day  piperacillin/tazobactam IVPB. 3.375 Gram(s) IV Intermittent once  piperacillin/tazobactam IVPB.- 3.375 Gram(s) IV Intermittent once  piperacillin/tazobactam IVPB.. 3.375 Gram(s) IV Intermittent every 8 hours    MEDICATIONS  (PRN):    Allergies    No Known Allergies    Intolerances    SOCIAL HISTORY:  , Denies smoking, ETOH, drugs    FAMILY HISTORY:  Family history of pancreatic cancer (Child)    Vital Signs Last 24 Hrs  T(C): 37.1 (20 Dec 2024 11:40), Max: 37.2 (19 Dec 2024 20:45)  T(F): 98.7 (20 Dec 2024 11:40), Max: 99 (19 Dec 2024 20:45)  HR: 62 (20 Dec 2024 11:40) (62 - 86)  BP: 121/62 (20 Dec 2024 11:40) (110/74 - 143/74)  BP(mean): --  RR: 18 (20 Dec 2024 11:40) (16 - 18)  SpO2: 99% (20 Dec 2024 11:40) (97% - 99%)    Parameters below as of 20 Dec 2024 11:40  Patient On (Oxygen Delivery Method): room air    Physical Exam:  General: alert, WN  Ophthamology: sclera clear  ENMT: moist mucous membranes, trachea midline  Respiratory: equal chest rise with respirations  Gastrointestinal: soft NT/ND  Neurology: verbal,  following commands  Psych: calm, cooperative  Musculoskeletal: no contractures  Vascular: BLE edema equal  Skin:  Sacrum/bilateral buttocks deep maroon discoloration with scattered patches of denuded skin with deep maroon edges, L 7cm x W 7cm x D 0.1cm, scant serosanguinous drainage   Right trochanter wound - 100% of wound bed is yellow fibrinous tissue, bone is palpable, not visible, wound edges are rolled, undermining from 9 - 3 o'clock superiorly to 2cm at the deepest, moderate yellow drainage on dressing, L 3.5 cm X 3.5cm x D 0.3cm  Left trochanter wound - darkened discoloration with scattered small scabs with intact skin bridges over are of L 2cmx W 2cm xD 0.1cm, no active drainage  RIght heel with dusky discolored intact skin, + boggy and TTP, no drainage L 3cm x W 3cm x D none  Left heel with dusky discolored intact skin, + boggy and TTP, no drainage L 3cm x W 3cm x D none  No odor, increased warmth, induration, fluctuance, erythema    LABS:  12-19    131[L]  |  98  |  21  ----------------------------<  132[H]  5.5[H]   |  20[L]  |  1.19    Ca    8.6      19 Dec 2024 20:58  Phos  3.6     12-19  Mg     2.2     12-19    TPro  7.4  /  Alb  3.3  /  TBili  0.7  /  DBili  x   /  AST  75[H]  /  ALT  23  /  AlkPhos  64  12-19                          11.2   8.82  )-----------( 350      ( 19 Dec 2024 20:58 )             35.3     PT/INR - ( 19 Dec 2024 20:58 )   PT: 17.7 sec;   INR: 1.55 ratio         PTT - ( 19 Dec 2024 20:58 )  PTT:32.1 sec  Urinalysis Basic - ( 19 Dec 2024 20:58 )    Color: x / Appearance: x / SG: x / pH: x  Gluc: 132 mg/dL / Ketone: x  / Bili: x / Urobili: x   Blood: x / Protein: x / Nitrite: x   Leuk Esterase: x / RBC: x / WBC x   Sq Epi: x / Non Sq Epi: x / Bacteria: x

## 2024-12-20 NOTE — CONSULT NOTE ADULT - SUBJECTIVE AND OBJECTIVE BOX
Patient is a 89y old  Male who presents with a chief complaint of nausea, vomiting (20 Dec 2024 11:14)    HPI:  Information obtained from chart review as patient with advanced dementia and I have not been able to reach daughter Larisa    Patient is a 89 year old male with advanced Alzheimer's dementia, hx of thyroid cancer s/p thyroidectomy, prostate cancer s/p prostatectomy, HFrEF, HTN, HLD  presents from home with  nausea, vomiting and poor PO intake x 1 day. 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 Currently patient is AAOX0, I am not able to obtain any history and this appears to be his baseline mentation.    (20 Dec 2024 11:14)     REVIEW OF SYSTEMS  [  ] ROS unobtainable because:    [ x ] All other systems negative except as noted below  Constitutional:  [ ] fever [ ] chills  [ ] weight loss  [ ]night sweat  [ ]poor appetite/PO intake [ ]fatigue   Skin:  [ ] rash [ ] phlebitis	  Eyes: [ ] icterus [ ] pain  [ ] discharge	  ENMT: [ ] sore throat  [ ] thrush [ ] ulcers [ ] exudates [ ]anosmia  Respiratory: [ ] dyspnea [ ] hemoptysis [ ] cough [ ] sputum	  Cardiovascular:  [ ] chest pain [ ] palpitations [ ] edema	  Gastrointestinal:  [ ] nausea [ ] vomiting [ ] diarrhea [ ] constipation [ ] pain	  Genitourinary:  [ ] dysuria [ ] frequency [ ] hematuria [ ] discharge [ ] flank pain  [ ] incontinence  Musculoskeletal:  [ ] myalgias [ ] arthralgias [ ] arthritis  [ ] back pain  Neurological:  [ ] headache [ ] weakness [ ] seizures  [ ] confusion/altered mental status  prior hospital charts reviewed [V]  primary team notes reviewed [V]  other consultant notes reviewed [V]    PAST MEDICAL & SURGICAL HISTORY:  Dementia      HTN (hypertension)      HLD (hyperlipidemia)      Thyroid cancer      Prostate cancer      S/P thyroidectomy  Total in 1980      S/P prostatectomy  2004          SOCIAL HISTORY:  - Denied smoking/vaping/alcohol/recreational drug use    FAMILY HISTORY:  Family history of pancreatic cancer (Child)        Allergies  No Known Allergies        ANTIMICROBIALS:  piperacillin/tazobactam IVPB. 3.375 once  piperacillin/tazobactam IVPB.- 3.375 once  piperacillin/tazobactam IVPB.. 3.375 every 8 hours      ANTIMICROBIALS (past 90 days):  MEDICATIONS  (STANDING):    piperacillin/tazobactam IVPB...   200 mL/Hr IV Intermittent (12-20-24 @ 02:19)        OTHER MEDS:   MEDICATIONS  (STANDING):  atorvastatin 20 at bedtime  carvedilol 12.5 every 12 hours  enalapril 20 daily  furosemide    Tablet 20 daily  levothyroxine 112 daily  memantine 10 two times a day      VITALS:  Vital Signs Last 24 Hrs  T(F): 98.1 (12-20-24 @ 07:01), Max: 99 (12-19-24 @ 20:45)    Vital Signs Last 24 Hrs  HR: 67 (12-20-24 @ 07:01) (63 - 86)  BP: 143/74 (12-20-24 @ 07:01) (110/74 - 143/74)  RR: 18 (12-20-24 @ 07:01)  SpO2: 97% (12-20-24 @ 07:01) (97% - 99%)  Wt(kg): --    EXAM:    GA: NAD, AOx3  HEENT: oral cavity no lesion  CV: nl S1/S2, no RMG  Lungs: CTAB, No distress  Abd: BS+, soft, nontender, no rebounding pain  Ext: no edema  Neuro: No focal deficits  Skin: Intact  IV: no phlebitis  Labs:                        11.2   8.82  )-----------( 350      ( 19 Dec 2024 20:58 )             35.3     12-19    131[L]  |  98  |  21  ----------------------------<  132[H]  5.5[H]   |  20[L]  |  1.19    Ca    8.6      19 Dec 2024 20:58  Phos  3.6     12-19  Mg     2.2     12-19    TPro  7.4  /  Alb  3.3  /  TBili  0.7  /  DBili  x   /  AST  75[H]  /  ALT  23  /  AlkPhos  64  12-19    WBC Trend:  WBC Count: 8.82 (12-19-24 @ 20:58)    Auto Neutrophil #: 7.28 K/uL (12-19-24 @ 20:58)  Band Neutrophils %: 0.9 % (12-19-24 @ 20:58)  Auto Neutrophil #: 6.62 K/uL (11-04-24 @ 03:15)    Creatine Trend:  Creatinine: 1.19 (12-19)    Liver Biochemical Testing Trend:  Alanine Aminotransferase (ALT/SGPT): 23 (12-19)  Alanine Aminotransferase (ALT/SGPT): 31 (11-04)  Aspartate Aminotransferase (AST/SGOT): 75 (12-19-24 @ 20:58)  Aspartate Aminotransferase (AST/SGOT): 32 (11-04-24 @ 03:15)  Bilirubin Total: 0.7 (12-19)  Bilirubin Total: 0.2 (11-04)    Trend LDH    Auto Eosinophil %: 0.0 % (12-19-24 @ 20:58)    Urinalysis Basic - ( 19 Dec 2024 20:58 )    Color: x / Appearance: x / SG: x / pH: x  Gluc: 132 mg/dL / Ketone: x  / Bili: x / Urobili: x   Blood: x / Protein: x / Nitrite: x   Leuk Esterase: x / RBC: x / WBC x   Sq Epi: x / Non Sq Epi: x / Bacteria: x      MICROBIOLOGY:    Troponin T, High Sensitivity Result: 178 (12-19)  Troponin T, High Sensitivity Result: 199 (12-19)    Blood Gas Venous - Lactate: 2.4 (12-19 @ 20:46)      CSF:      RADIOLOGY:   Patient is a 89y old  Male who presents with a chief complaint of nausea, vomiting (20 Dec 2024 11:14)    HPI:  Information obtained from chart review as patient with advanced dementia and I have not been able to reach daughter Larisa    Patient is a 89 year old male with advanced Alzheimer's dementia, hx of thyroid cancer s/p thyroidectomy, prostate cancer s/p prostatectomy, HFrEF, HTN, HLD  presents from home with  nausea, vomiting and poor PO intake x 1 day. 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 Currently patient is AAOX0, I am not able to obtain any history and this appears to be his baseline mentation.    (20 Dec 2024 11:14)     REVIEW OF SYSTEMS  [  ] ROS unobtainable because:    [ x ] All other systems negative except as noted below  Constitutional:  [ ] fever [ ] chills  [ ] weight loss  [ ]night sweat  [ ]poor appetite/PO intake [ ]fatigue   Skin:  [ ] rash [ ] phlebitis	  Eyes: [ ] icterus [ ] pain  [ ] discharge	  ENMT: [ ] sore throat  [ ] thrush [ ] ulcers [ ] exudates [ ]anosmia  Respiratory: [ ] dyspnea [ ] hemoptysis [ ] cough [ ] sputum	  Cardiovascular:  [ ] chest pain [ ] palpitations [ ] edema	  Gastrointestinal:  [ ] nausea [ ] vomiting [ ] diarrhea [ ] constipation [ ] pain	  Genitourinary:  [ ] dysuria [ ] frequency [ ] hematuria [ ] discharge [ ] flank pain  [ ] incontinence  Musculoskeletal:  [ ] myalgias [ ] arthralgias [ ] arthritis  [ ] back pain  Neurological:  [ ] headache [ ] weakness [ ] seizures  [ ] confusion/altered mental status  prior hospital charts reviewed [V]  primary team notes reviewed [V]  other consultant notes reviewed [V]    PAST MEDICAL & SURGICAL HISTORY:  Dementia      HTN (hypertension)      HLD (hyperlipidemia)      Thyroid cancer      Prostate cancer      S/P thyroidectomy  Total in 1980      S/P prostatectomy  2004          SOCIAL HISTORY:  - Denied smoking/vaping/alcohol/recreational drug use    FAMILY HISTORY:  Family history of pancreatic cancer (Child)        Allergies  No Known Allergies        ANTIMICROBIALS:  piperacillin/tazobactam IVPB. 3.375 once  piperacillin/tazobactam IVPB.- 3.375 once  piperacillin/tazobactam IVPB.. 3.375 every 8 hours      ANTIMICROBIALS (past 90 days):  MEDICATIONS  (STANDING):    piperacillin/tazobactam IVPB...   200 mL/Hr IV Intermittent (12-20-24 @ 02:19)        OTHER MEDS:   MEDICATIONS  (STANDING):  atorvastatin 20 at bedtime  carvedilol 12.5 every 12 hours  enalapril 20 daily  furosemide    Tablet 20 daily  levothyroxine 112 daily  memantine 10 two times a day      VITALS:  Vital Signs Last 24 Hrs  T(F): 98.1 (12-20-24 @ 07:01), Max: 99 (12-19-24 @ 20:45)    Vital Signs Last 24 Hrs  HR: 67 (12-20-24 @ 07:01) (63 - 86)  BP: 143/74 (12-20-24 @ 07:01) (110/74 - 143/74)  RR: 18 (12-20-24 @ 07:01)  SpO2: 97% (12-20-24 @ 07:01) (97% - 99%)  Wt(kg): --    EXAM:    GA: NAD, AOx3  HEENT: oral cavity no lesion  CV: nl S1/S2, no RMG  Lungs: CTAB, No distress  Abd: BS+, soft, nontender, no rebounding pain  Ext: no edema  Neuro: No focal deficits  Skin: Intact  IV: no phlebitis  Labs:                        11.2   8.82  )-----------( 350      ( 19 Dec 2024 20:58 )             35.3     12-19    131[L]  |  98  |  21  ----------------------------<  132[H]  5.5[H]   |  20[L]  |  1.19    Ca    8.6      19 Dec 2024 20:58  Phos  3.6     12-19  Mg     2.2     12-19    TPro  7.4  /  Alb  3.3  /  TBili  0.7  /  DBili  x   /  AST  75[H]  /  ALT  23  /  AlkPhos  64  12-19    WBC Trend:  WBC Count: 8.82 (12-19-24 @ 20:58)    Auto Neutrophil #: 7.28 K/uL (12-19-24 @ 20:58)  Band Neutrophils %: 0.9 % (12-19-24 @ 20:58)  Auto Neutrophil #: 6.62 K/uL (11-04-24 @ 03:15)    Creatine Trend:  Creatinine: 1.19 (12-19)    Liver Biochemical Testing Trend:  Alanine Aminotransferase (ALT/SGPT): 23 (12-19)  Alanine Aminotransferase (ALT/SGPT): 31 (11-04)  Aspartate Aminotransferase (AST/SGOT): 75 (12-19-24 @ 20:58)  Aspartate Aminotransferase (AST/SGOT): 32 (11-04-24 @ 03:15)  Bilirubin Total: 0.7 (12-19)  Bilirubin Total: 0.2 (11-04)    Trend LDH    Auto Eosinophil %: 0.0 % (12-19-24 @ 20:58)    Urinalysis Basic - ( 19 Dec 2024 20:58 )    Color: x / Appearance: x / SG: x / pH: x  Gluc: 132 mg/dL / Ketone: x  / Bili: x / Urobili: x   Blood: x / Protein: x / Nitrite: x   Leuk Esterase: x / RBC: x / WBC x   Sq Epi: x / Non Sq Epi: x / Bacteria: x      MICROBIOLOGY:    Troponin T, High Sensitivity Result: 178 (12-19)  Troponin T, High Sensitivity Result: 199 (12-19)    Blood Gas Venous - Lactate: 2.4 (12-19 @ 20:46)      CSF:      RADIOLOGY:  < from: US Abdomen Upper Quadrant Right (12.20.24 @ 04:39) >  IMPRESSION:  Distended gallbladder with stones in the neck, wall thickening and trace   pericholecystic fluid. Unreliable sonographic Bañuelos sign. Findings   remain suspicious for acute cholecystitis.    < end of copied text >  < from: CT Abdomen and Pelvis w/ IV Cont (12.19.24 @ 22:50) >  IMPRESSION:  CTPA: Suboptimal study for evaluation of the left lower lobe pulmonary   vasculature. Otherwise, no pulmonary embolism.  Small bilateral pleural effusions, left greater than right.    Abdomen/pelvis: No bowel obstruction. Gallbladder findings suspicious for   acute cholecystitis.  Moderately distended fecal filled rectum with perirectal fat stranding   which could be due to presacral edema. No wall thickening. Stercoral   colitis should be excluded on a clinical basis.    < end of copied text >   Patient is a 89y old  Male who presents with a chief complaint of nausea, vomiting (20 Dec 2024 11:14)    HPI:  Information obtained from chart review as patient with advanced dementia and I have not been able to reach daughter Larisa    Patient is a 89 year old male with advanced Alzheimer's dementia, hx of thyroid cancer s/p thyroidectomy, prostate cancer s/p prostatectomy, HFrEF, HTN, HLD  presents from home with  nausea, vomiting and poor PO intake x 1 day. 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 Currently patient is AAOX0, I am not able to obtain any history and this appears to be his baseline mentation.    (20 Dec 2024 11:14)     REVIEW OF SYSTEMS  [x ] ROS unobtainable because:  non verbal  [  ] All other systems negative except as noted below  Constitutional:  [ ] fever [ ] chills  [ ] weight loss  [ ]night sweat  [ ]poor appetite/PO intake [ ]fatigue   Skin:  [ ] rash [ ] phlebitis	  Eyes: [ ] icterus [ ] pain  [ ] discharge	  ENMT: [ ] sore throat  [ ] thrush [ ] ulcers [ ] exudates [ ]anosmia  Respiratory: [ ] dyspnea [ ] hemoptysis [ ] cough [ ] sputum	  Cardiovascular:  [ ] chest pain [ ] palpitations [ ] edema	  Gastrointestinal:  [ ] nausea [ ] vomiting [ ] diarrhea [ ] constipation [ ] pain	  Genitourinary:  [ ] dysuria [ ] frequency [ ] hematuria [ ] discharge [ ] flank pain  [ ] incontinence  Musculoskeletal:  [ ] myalgias [ ] arthralgias [ ] arthritis  [ ] back pain  Neurological:  [ ] headache [ ] weakness [ ] seizures  [ ] confusion/altered mental status  prior hospital charts reviewed [V]  primary team notes reviewed [V]  other consultant notes reviewed [V]    PAST MEDICAL & SURGICAL HISTORY:  Dementia      HTN (hypertension)      HLD (hyperlipidemia)      Thyroid cancer      Prostate cancer      S/P thyroidectomy  Total in 1980      S/P prostatectomy  2004          SOCIAL HISTORY:  - Denied smoking/vaping/alcohol/recreational drug use    FAMILY HISTORY:  Family history of pancreatic cancer (Child)        Allergies  No Known Allergies        ANTIMICROBIALS:  piperacillin/tazobactam IVPB. 3.375 once  piperacillin/tazobactam IVPB.- 3.375 once  piperacillin/tazobactam IVPB.. 3.375 every 8 hours      ANTIMICROBIALS (past 90 days):  MEDICATIONS  (STANDING):    piperacillin/tazobactam IVPB...   200 mL/Hr IV Intermittent (12-20-24 @ 02:19)        OTHER MEDS:   MEDICATIONS  (STANDING):  atorvastatin 20 at bedtime  carvedilol 12.5 every 12 hours  enalapril 20 daily  furosemide    Tablet 20 daily  levothyroxine 112 daily  memantine 10 two times a day      VITALS:  Vital Signs Last 24 Hrs  T(F): 98.1 (12-20-24 @ 07:01), Max: 99 (12-19-24 @ 20:45)    Vital Signs Last 24 Hrs  HR: 67 (12-20-24 @ 07:01) (63 - 86)  BP: 143/74 (12-20-24 @ 07:01) (110/74 - 143/74)  RR: 18 (12-20-24 @ 07:01)  SpO2: 97% (12-20-24 @ 07:01) (97% - 99%)  Wt(kg): --    EXAM:    GA: NAD  HEENT: uncooperative  CV: nl S1/S2, no RMG  Lungs: CTAB, No distress  Abd: BS+, soft, nontender, no rebounding pain  Ext: trace edema  Neuro: No focal deficits  Skin:  wounds noted to right hip, reportedly on sacrum and left heal stage 2  IV: no phlebitis  Labs:                        11.2   8.82  )-----------( 350      ( 19 Dec 2024 20:58 )             35.3     12-19    131[L]  |  98  |  21  ----------------------------<  132[H]  5.5[H]   |  20[L]  |  1.19    Ca    8.6      19 Dec 2024 20:58  Phos  3.6     12-19  Mg     2.2     12-19    TPro  7.4  /  Alb  3.3  /  TBili  0.7  /  DBili  x   /  AST  75[H]  /  ALT  23  /  AlkPhos  64  12-19    WBC Trend:  WBC Count: 8.82 (12-19-24 @ 20:58)    Auto Neutrophil #: 7.28 K/uL (12-19-24 @ 20:58)  Band Neutrophils %: 0.9 % (12-19-24 @ 20:58)  Auto Neutrophil #: 6.62 K/uL (11-04-24 @ 03:15)    Creatine Trend:  Creatinine: 1.19 (12-19)    Liver Biochemical Testing Trend:  Alanine Aminotransferase (ALT/SGPT): 23 (12-19)  Alanine Aminotransferase (ALT/SGPT): 31 (11-04)  Aspartate Aminotransferase (AST/SGOT): 75 (12-19-24 @ 20:58)  Aspartate Aminotransferase (AST/SGOT): 32 (11-04-24 @ 03:15)  Bilirubin Total: 0.7 (12-19)  Bilirubin Total: 0.2 (11-04)    Trend LDH    Auto Eosinophil %: 0.0 % (12-19-24 @ 20:58)    Urinalysis Basic - ( 19 Dec 2024 20:58 )    Color: x / Appearance: x / SG: x / pH: x  Gluc: 132 mg/dL / Ketone: x  / Bili: x / Urobili: x   Blood: x / Protein: x / Nitrite: x   Leuk Esterase: x / RBC: x / WBC x   Sq Epi: x / Non Sq Epi: x / Bacteria: x      MICROBIOLOGY:    Troponin T, High Sensitivity Result: 178 (12-19)  Troponin T, High Sensitivity Result: 199 (12-19)    Blood Gas Venous - Lactate: 2.4 (12-19 @ 20:46)      CSF:      RADIOLOGY:  < from: US Abdomen Upper Quadrant Right (12.20.24 @ 04:39) >  IMPRESSION:  Distended gallbladder with stones in the neck, wall thickening and trace   pericholecystic fluid. Unreliable sonographic Bañuelos sign. Findings   remain suspicious for acute cholecystitis.    < end of copied text >  < from: CT Abdomen and Pelvis w/ IV Cont (12.19.24 @ 22:50) >  IMPRESSION:  CTPA: Suboptimal study for evaluation of the left lower lobe pulmonary   vasculature. Otherwise, no pulmonary embolism.  Small bilateral pleural effusions, left greater than right.    Abdomen/pelvis: No bowel obstruction. Gallbladder findings suspicious for   acute cholecystitis.  Moderately distended fecal filled rectum with perirectal fat stranding   which could be due to presacral edema. No wall thickening. Stercoral   colitis should be excluded on a clinical basis.    < end of copied text >   Patient is a 89y old  Male who presents with a chief complaint of nausea, vomiting (20 Dec 2024 11:14)    HPI:  Information obtained from chart review as patient with advanced dementia and I have not been able to reach daughter Larisa    Patient is a 89 year old male with advanced Alzheimer's dementia, hx of thyroid cancer s/p thyroidectomy, prostate cancer s/p prostatectomy, HFrEF, HTN, HLD  presents from home with  nausea, vomiting and poor PO intake x 1 day. 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 Currently patient is AAOX0, I am not able to obtain any history and this appears to be his baseline mentation.    (20 Dec 2024 11:14)     REVIEW OF SYSTEMS  [x ] ROS unobtainable because:  non verbal/dementia  [  ] All other systems negative except as noted below  Constitutional:  [ ] fever [ ] chills  [ ] weight loss  [ ]night sweat  [ ]poor appetite/PO intake [ ]fatigue   Skin:  [ ] rash [ ] phlebitis	  Eyes: [ ] icterus [ ] pain  [ ] discharge	  ENMT: [ ] sore throat  [ ] thrush [ ] ulcers [ ] exudates [ ]anosmia  Respiratory: [ ] dyspnea [ ] hemoptysis [ ] cough [ ] sputum	  Cardiovascular:  [ ] chest pain [ ] palpitations [ ] edema	  Gastrointestinal:  [ ] nausea [ ] vomiting [ ] diarrhea [ ] constipation [ ] pain	  Genitourinary:  [ ] dysuria [ ] frequency [ ] hematuria [ ] discharge [ ] flank pain  [ ] incontinence  Musculoskeletal:  [ ] myalgias [ ] arthralgias [ ] arthritis  [ ] back pain  Neurological:  [ ] headache [ ] weakness [ ] seizures  [ ] confusion/altered mental status  prior hospital charts reviewed [V]  primary team notes reviewed [V]  other consultant notes reviewed [V]    PAST MEDICAL & SURGICAL HISTORY:  Dementia      HTN (hypertension)      HLD (hyperlipidemia)      Thyroid cancer      Prostate cancer      S/P thyroidectomy  Total in 1980      S/P prostatectomy  2004          SOCIAL HISTORY:  - Denied smoking/vaping/alcohol/recreational drug use    FAMILY HISTORY:  Family history of pancreatic cancer (Child)        Allergies  No Known Allergies        ANTIMICROBIALS:  piperacillin/tazobactam IVPB. 3.375 once  piperacillin/tazobactam IVPB.- 3.375 once  piperacillin/tazobactam IVPB.. 3.375 every 8 hours      ANTIMICROBIALS (past 90 days):  MEDICATIONS  (STANDING):    piperacillin/tazobactam IVPB...   200 mL/Hr IV Intermittent (12-20-24 @ 02:19)        OTHER MEDS:   MEDICATIONS  (STANDING):  atorvastatin 20 at bedtime  carvedilol 12.5 every 12 hours  enalapril 20 daily  furosemide    Tablet 20 daily  levothyroxine 112 daily  memantine 10 two times a day      VITALS:  Vital Signs Last 24 Hrs  T(F): 98.1 (12-20-24 @ 07:01), Max: 99 (12-19-24 @ 20:45)    Vital Signs Last 24 Hrs  HR: 67 (12-20-24 @ 07:01) (63 - 86)  BP: 143/74 (12-20-24 @ 07:01) (110/74 - 143/74)  RR: 18 (12-20-24 @ 07:01)  SpO2: 97% (12-20-24 @ 07:01) (97% - 99%)  Wt(kg): --    EXAM:    GA: NAD  HEENT: uncooperative  CV: nl S1/S2, no RMG  Lungs: CTAB, No distress  Abd: BS+, soft, nontender, no rebounding pain  Ext: trace edema  Neuro: No focal deficits  Skin:  wounds noted to right hip, reportedly on sacrum and left heal stage 2  IV: no phlebitis  Labs:                        11.2   8.82  )-----------( 350      ( 19 Dec 2024 20:58 )             35.3     12-19    131[L]  |  98  |  21  ----------------------------<  132[H]  5.5[H]   |  20[L]  |  1.19    Ca    8.6      19 Dec 2024 20:58  Phos  3.6     12-19  Mg     2.2     12-19    TPro  7.4  /  Alb  3.3  /  TBili  0.7  /  DBili  x   /  AST  75[H]  /  ALT  23  /  AlkPhos  64  12-19    WBC Trend:  WBC Count: 8.82 (12-19-24 @ 20:58)    Auto Neutrophil #: 7.28 K/uL (12-19-24 @ 20:58)  Band Neutrophils %: 0.9 % (12-19-24 @ 20:58)  Auto Neutrophil #: 6.62 K/uL (11-04-24 @ 03:15)    Creatine Trend:  Creatinine: 1.19 (12-19)    Liver Biochemical Testing Trend:  Alanine Aminotransferase (ALT/SGPT): 23 (12-19)  Alanine Aminotransferase (ALT/SGPT): 31 (11-04)  Aspartate Aminotransferase (AST/SGOT): 75 (12-19-24 @ 20:58)  Aspartate Aminotransferase (AST/SGOT): 32 (11-04-24 @ 03:15)  Bilirubin Total: 0.7 (12-19)  Bilirubin Total: 0.2 (11-04)    Trend LDH    Auto Eosinophil %: 0.0 % (12-19-24 @ 20:58)    Urinalysis Basic - ( 19 Dec 2024 20:58 )    Color: x / Appearance: x / SG: x / pH: x  Gluc: 132 mg/dL / Ketone: x  / Bili: x / Urobili: x   Blood: x / Protein: x / Nitrite: x   Leuk Esterase: x / RBC: x / WBC x   Sq Epi: x / Non Sq Epi: x / Bacteria: x      MICROBIOLOGY:    Troponin T, High Sensitivity Result: 178 (12-19)  Troponin T, High Sensitivity Result: 199 (12-19)    Blood Gas Venous - Lactate: 2.4 (12-19 @ 20:46)      CSF:      RADIOLOGY:  < from: US Abdomen Upper Quadrant Right (12.20.24 @ 04:39) >  IMPRESSION:  Distended gallbladder with stones in the neck, wall thickening and trace   pericholecystic fluid. Unreliable sonographic Bañuelos sign. Findings   remain suspicious for acute cholecystitis.    < end of copied text >  < from: CT Abdomen and Pelvis w/ IV Cont (12.19.24 @ 22:50) >  IMPRESSION:  CTPA: Suboptimal study for evaluation of the left lower lobe pulmonary   vasculature. Otherwise, no pulmonary embolism.  Small bilateral pleural effusions, left greater than right.    Abdomen/pelvis: No bowel obstruction. Gallbladder findings suspicious for   acute cholecystitis.  Moderately distended fecal filled rectum with perirectal fat stranding   which could be due to presacral edema. No wall thickening. Stercoral   colitis should be excluded on a clinical basis.    < end of copied text >

## 2024-12-20 NOTE — PATIENT PROFILE ADULT - FUNCTIONAL ASSESSMENT - BASIC MOBILITY 6.
1-calculated by average/Not able to assess (calculate score using [facility] averaging method)

## 2024-12-20 NOTE — H&P ADULT - ASSESSMENT
Patient is a 89 year old male with advanced Alzheimer's dementia, hx of thyroid cancer s/p thyroidectomy, prostate cancer s/p prostatectomy, HFrEF, HTN, HLD  presents from home with  nausea, vomiting and poor PO intake x 1 day admitted for acute cholecystitis awaiting perc ulises drain.

## 2024-12-20 NOTE — PATIENT PROFILE ADULT - [id number]
Quality 110: Preventive Care And Screening: Influenza Immunization: Influenza Immunization not Administered because Patient Refused. Quality 431: Preventive Care And Screening: Unhealthy Alcohol Use - Screening: Patient not identified as an unhealthy alcohol user when screened for unhealthy alcohol use using a systematic screening method Quality 111:Pneumonia Vaccination Status For Older Adults: Pneumococcal vaccine was not administered on or after patient’s 60th birthday and before the end of the measurement period, reason not otherwise specified Detail Level: Detailed Quality 226: Preventive Care And Screening: Tobacco Use: Screening And Cessation Intervention: Patient screened for tobacco use and is an ex/non-smoker suspected deep tissue injury

## 2024-12-20 NOTE — CONSULT NOTE ADULT - SUBJECTIVE AND OBJECTIVE BOX
GENERAL SURGERY CONSULT NOTE    HPI: 70M PMH HFrEF, HTN, HLD, Alzheimer's (daughter decision maker), thyroid CA s/p thyroidectomy prostate CA s/p prostatectomy p/t ED for 1 day nausea, vomiting with poor PO intake. Per daughter, pt was d/c'd from [facility] last Friday, started on eliquis for DVT/PE and was told to report to hospital if n/v. Concerned if it might b related to medications. Denies fever, chills, CP, SOB.     Surgery consulted for acute cholecystitis.     In the ED, patient was afebrile, VSS, WBC 8.8, INR 1.55, K 5.5, Na 131, t bili 0.7, ALP 64, AST 75, ALT 23, Lipase 28, ProBNP 53165.   US shows distended GB w/ pericholecystic fluid w/stones in neck, wall thickening  CT shows Distended gallbladder with wall thickening and cholelithiasis. Stranding in the pericholecystic fat suggested, however, limited by motion and streak artifact from patient's positioning with arms at the side.  - Stercoral colitis     PMH/PSH: Dementia    HTN (hypertension)    HLD (hyperlipidemia)    Thyroid cancer    Prostate cancer    S/P thyroidectomy    S/P prostatectomy     MEDS:    ALLERGIES: NKDA    REVIEW OF SYSTEMS: All ROS negative except as per HPI.  ____________________________________________    VITALS:T(C): 36.5, Max: 37.2 (12-19)  T(F): 97.7, Max: 99 (12-19)  HR: 63 (63 - 86)  BP: 131/62 (110/74 - 131/62)  BP(mean): --  RR: 16 (16 - 16)  SpO2: 98% (98% - 99%) room air         PHYSICAL EXAM:  General: AAOx0, no acute distress.  Respiratory: breathing comfortably, no increased WOB   Abdomen: soft, +RUQ TTP, nondistended, no rebound, no guarding  Extremities: Moves all four.   ____________________________________________    LABS:                      11.2  8.82 )-----------( 350    ( 19 Dec 2024 20:58 )             35.3  131[L]  |  98  |  21  ----------------------------<  132[H]    (12-19)  5.5[H]   |  20[L]  |  1.19          Ca    8.6      12-19  Mg    2.2  Phos  3.6        LIVER FUNCTIONS - ( 19 Dec 2024 20:58 )  Alb: 3.3 g/dL / Pro: 7.4 g/dL / ALK PHOS: 64 U/L / ALT: 23 U/L / AST: 75 U/L / GGT: x      PT/INR - ( 19 Dec 2024 20:58 )   PT: 17.7 sec;   INR: 1.55 ratio         PTT - ( 19 Dec 2024 20:58 )  PTT:32.1 sec  Urinalysis Basic - ( 19 Dec 2024 20:58 )    Color: x / Appearance: x / SG: x / pH: x  Gluc: 132 mg/dL / Ketone: x  / Bili: x / Urobili: x   Blood: x / Protein: x / Nitrite: x   Leuk Esterase: x / RBC: x / WBC x   Sq Epi: x / Non Sq Epi: x / Bacteria: x      ____________________________________________    RADIOLOGY & ADDITIONAL STUDIES:    < from: US Abdomen Upper Quadrant Right (12.20.24 @ 04:39) >  FINDINGS:  Liver: Within normal limits.  Bile ducts: Normal caliber. Common bile duct measures 6 mm.  Gallbladder: Distended with sludge and stones, most of which is in the   gallbladder neck. Gallbladder wall thickening and trace pericholecystic   fluid. Sonographic Bañuelos's sign reported as negative, however, patient   was premedicated prior to the exam.  Pancreas: Not well visualized. Right kidney: 11.8 cm. No hydronephrosis.   4.2 cm cyst.  Ascites: None.  IVC: Visualized portions are within normal limits.    IMPRESSION:  Distended gallbladder with stones in the neck, wall thickening and trace   pericholecystic fluid. Unreliable sonographic Bañuelos sign. Findings   remain suspicious for acute cholecystitis.    < end of copied text >  < from: CT Abdomen and Pelvis w/ IV Cont (12.19.24 @ 22:50) >  PROCEDURE:  CT Angiography of the Chest was performed followed by portal venous phase   imaging of the Abdomen and Pelvis.  Sagittal and coronal reformats were performed as well as 3D (MIP)   reconstructions.    FINDINGS:  CHEST:  LUNGS AND LARGE AIRWAYS: Patent central airways. No pulmonary nodules. No   consolidation. Partial compressive atelectasis of the lung bases.  PLEURA: Small bilateral pleural effusions, greater on the left.  VESSELS: Limited evaluation of some subsegmental branches of the LLL due   to poor distal opacification related to mixing artifacts and perfusion   differential likely from chronic heart failure. Within those limitation   no pulmonary embolism.  HEART: Cardiomegaly. No pericardial effusion.  MEDIASTINUM AND NERISSA: No lymphadenopathy.  CHEST WALL AND LOWER NECK: Subcutaneous edema left lower chest wall.    ABDOMEN AND PELVIS:  LIVER: Within normal limits.  BILE DUCTS: Normal caliber.  GALLBLADDER: Distended gallbladder with wall thickening and   cholelithiasis. Stranding in the pericholecystic fat suggested, however,   limited by motion and streak artifact from patient's positioning with   arms at the side.  SPLEEN: Within normal limits.  PANCREAS: Within normal limits.  ADRENALS: Within normal limits.  KIDNEYS/URETERS: No renal stones or hydronephrosis. Right renal cyst and   subcentimeter hypodensities too small to characterize    BLADDER: Mild bladder wall thickening, likely related to chronic   obstruction given prostatectomy, correlate forcystitis.  REPRODUCTIVE ORGANS: Prostatectomy.    BOWEL: No bowel obstruction. Appendix is normal. Moderate fecal burden in   the colon. Moderately distended fecal filled rectum without significant   wall thickening. Perirectal fat stranding.  Large rectal stool.  PERITONEUM/RETROPERITONEUM: Within normal limits.  VESSELS: Atherosclerotic changes.  LYMPH NODES: No lymphadenopathy.  ABDOMINAL WALL: Anasarca. BONES: Degenerative changes.    IMPRESSION:  CTPA: Suboptimal study for evaluation of the left lower lobe pulmonary   vasculature. Otherwise, no pulmonary embolism.  Small bilateral pleural effusions, left greater than right.    Abdomen/pelvis: No bowel obstruction. Gallbladder findings suspicious for   acute cholecystitis.  Moderately distended fecal filled rectum with perirectal fat stranding   which could be due to presacral edema. No wall thickening. Stercoral   colitis should be excluded on a clinical basis.    < end of copied text >   GENERAL SURGERY CONSULT NOTE    HPI: 89M PMH HFrEF, HTN, HLD, Alzheimer's (daughter decision maker), thyroid CA s/p thyroidectomy prostate CA s/p prostatectomy p/t ED for 1 day nausea, vomiting with poor PO intake. Per daughter, pt was d/c'd from [facility] last Friday, started on eliquis for DVT/PE and was told to report to hospital if n/v. Concerned if it might b related to medications. Denies fever, chills, CP, SOB.     Surgery consulted for acute cholecystitis.     In the ED, patient was afebrile, VSS, WBC 8.8, INR 1.55, K 5.5, Na 131, t bili 0.7, ALP 64, AST 75, ALT 23, Lipase 28, ProBNP 33236.   US shows distended GB w/ pericholecystic fluid w/stones in neck, wall thickening  CT shows Distended gallbladder with wall thickening and cholelithiasis. Stranding in the pericholecystic fat suggested, however, limited by motion and streak artifact from patient's positioning with arms at the side.  - Stercoral colitis     PMH/PSH: Dementia    HTN (hypertension)    HLD (hyperlipidemia)    Thyroid cancer    Prostate cancer    S/P thyroidectomy    S/P prostatectomy     MEDS:    ALLERGIES: NKDA    REVIEW OF SYSTEMS: All ROS negative except as per HPI.  ____________________________________________    VITALS:T(C): 36.5, Max: 37.2 (12-19)  T(F): 97.7, Max: 99 (12-19)  HR: 63 (63 - 86)  BP: 131/62 (110/74 - 131/62)  BP(mean): --  RR: 16 (16 - 16)  SpO2: 98% (98% - 99%) room air         PHYSICAL EXAM:  General: AAOx0, no acute distress.  Respiratory: breathing comfortably, no increased WOB   Abdomen: soft, +RUQ TTP, nondistended, no rebound, no guarding  Extremities: Moves all four.   ____________________________________________    LABS:                      11.2  8.82 )-----------( 350    ( 19 Dec 2024 20:58 )             35.3  131[L]  |  98  |  21  ----------------------------<  132[H]    (12-19)  5.5[H]   |  20[L]  |  1.19          Ca    8.6      12-19  Mg    2.2  Phos  3.6        LIVER FUNCTIONS - ( 19 Dec 2024 20:58 )  Alb: 3.3 g/dL / Pro: 7.4 g/dL / ALK PHOS: 64 U/L / ALT: 23 U/L / AST: 75 U/L / GGT: x      PT/INR - ( 19 Dec 2024 20:58 )   PT: 17.7 sec;   INR: 1.55 ratio         PTT - ( 19 Dec 2024 20:58 )  PTT:32.1 sec  Urinalysis Basic - ( 19 Dec 2024 20:58 )    Color: x / Appearance: x / SG: x / pH: x  Gluc: 132 mg/dL / Ketone: x  / Bili: x / Urobili: x   Blood: x / Protein: x / Nitrite: x   Leuk Esterase: x / RBC: x / WBC x   Sq Epi: x / Non Sq Epi: x / Bacteria: x      ____________________________________________    RADIOLOGY & ADDITIONAL STUDIES:    < from: US Abdomen Upper Quadrant Right (12.20.24 @ 04:39) >  FINDINGS:  Liver: Within normal limits.  Bile ducts: Normal caliber. Common bile duct measures 6 mm.  Gallbladder: Distended with sludge and stones, most of which is in the   gallbladder neck. Gallbladder wall thickening and trace pericholecystic   fluid. Sonographic Bañuelos's sign reported as negative, however, patient   was premedicated prior to the exam.  Pancreas: Not well visualized. Right kidney: 11.8 cm. No hydronephrosis.   4.2 cm cyst.  Ascites: None.  IVC: Visualized portions are within normal limits.    IMPRESSION:  Distended gallbladder with stones in the neck, wall thickening and trace   pericholecystic fluid. Unreliable sonographic Bañuelos sign. Findings   remain suspicious for acute cholecystitis.    < end of copied text >  < from: CT Abdomen and Pelvis w/ IV Cont (12.19.24 @ 22:50) >  PROCEDURE:  CT Angiography of the Chest was performed followed by portal venous phase   imaging of the Abdomen and Pelvis.  Sagittal and coronal reformats were performed as well as 3D (MIP)   reconstructions.    FINDINGS:  CHEST:  LUNGS AND LARGE AIRWAYS: Patent central airways. No pulmonary nodules. No   consolidation. Partial compressive atelectasis of the lung bases.  PLEURA: Small bilateral pleural effusions, greater on the left.  VESSELS: Limited evaluation of some subsegmental branches of the LLL due   to poor distal opacification related to mixing artifacts and perfusion   differential likely from chronic heart failure. Within those limitation   no pulmonary embolism.  HEART: Cardiomegaly. No pericardial effusion.  MEDIASTINUM AND NERISSA: No lymphadenopathy.  CHEST WALL AND LOWER NECK: Subcutaneous edema left lower chest wall.    ABDOMEN AND PELVIS:  LIVER: Within normal limits.  BILE DUCTS: Normal caliber.  GALLBLADDER: Distended gallbladder with wall thickening and   cholelithiasis. Stranding in the pericholecystic fat suggested, however,   limited by motion and streak artifact from patient's positioning with   arms at the side.  SPLEEN: Within normal limits.  PANCREAS: Within normal limits.  ADRENALS: Within normal limits.  KIDNEYS/URETERS: No renal stones or hydronephrosis. Right renal cyst and   subcentimeter hypodensities too small to characterize    BLADDER: Mild bladder wall thickening, likely related to chronic   obstruction given prostatectomy, correlate forcystitis.  REPRODUCTIVE ORGANS: Prostatectomy.    BOWEL: No bowel obstruction. Appendix is normal. Moderate fecal burden in   the colon. Moderately distended fecal filled rectum without significant   wall thickening. Perirectal fat stranding.  Large rectal stool.  PERITONEUM/RETROPERITONEUM: Within normal limits.  VESSELS: Atherosclerotic changes.  LYMPH NODES: No lymphadenopathy.  ABDOMINAL WALL: Anasarca. BONES: Degenerative changes.    IMPRESSION:  CTPA: Suboptimal study for evaluation of the left lower lobe pulmonary   vasculature. Otherwise, no pulmonary embolism.  Small bilateral pleural effusions, left greater than right.    Abdomen/pelvis: No bowel obstruction. Gallbladder findings suspicious for   acute cholecystitis.  Moderately distended fecal filled rectum with perirectal fat stranding   which could be due to presacral edema. No wall thickening. Stercoral   colitis should be excluded on a clinical basis.    < end of copied text >

## 2024-12-20 NOTE — CONSULT NOTE ADULT - ASSESSMENT
89M PMH HFrEF, HTN, HLD, Alzheimer's (daughter decision maker), thyroid CA s/p thyroidectomy prostate CA s/p prostatectomy p/t ED for 1 day nausea, vomiting with poor PO intake. Surgery consulted for acute cholecystitis.     PLAN  - Not a surgical candidate at this time d/t poor optimization for surgery and not within goals of care per daughter  - Recommend medicine admission for IV abx and pain control   - Recommend IR consultation for percutaneous cholecystostomy tube placement for decompression    Discussed with Dr. Elias    ACS/Trauma  [phone] (pager)  89M PMH HFrEF, HTN, HLD, Alzheimer's (daughter decision maker), thyroid CA s/p thyroidectomy prostate CA s/p prostatectomy p/t ED for 1 day nausea, vomiting with poor PO intake. Surgery consulted for acute cholecystitis. Patient with an unreliable physical exam in the setting of mental status, afebrile, no leukocytosis, normal total bilirubin, and imaging significant for ultrasound with cholethiasis with distended gallbladder and trace pericholecystic fluid. In summary, findings are currently inconclusive for acute cholecystitis.    PLAN  - Please obtain HIDA scan to evaluate for acute cholecystitis.   - If HIDA scan positive, would recommend IR percutaneous cholecystotomy tube placement  - If HIDA scan negative, would recommend against IR intervention  - No indications for acute surgical intervention at this time  - Patient is a surgical candidate at this time due to poor optimization for surgery and not within goals of care per daughter  - Recommend medicine admission for IV abx and pain control     Discussed with Dr. Elias and Dr. Menjivar.    ACS/Trauma  [phone] (pager)  89M PMH HFrEF, HTN, HLD, Alzheimer's (daughter decision maker), thyroid CA s/p thyroidectomy prostate CA s/p prostatectomy p/t ED for 1 day nausea, vomiting with poor PO intake. Surgery consulted for acute cholecystitis. Patient with an unreliable physical exam in the setting of mental status, afebrile, no leukocytosis, normal total bilirubin, and imaging significant for ultrasound with cholethiasis with distended gallbladder and trace pericholecystic fluid. In summary, findings are currently inconclusive for acute cholecystitis.    PLAN  - Please obtain HIDA scan to evaluate for acute cholecystitis.   - If HIDA scan positive, would recommend IR percutaneous cholecystotomy tube placement  - If HIDA scan negative, would recommend against IR intervention  - No indications for acute surgical intervention at this time  - Patient is not a surgical candidate at this time due to poor optimization for surgery and not within goals of care per daughter  - Recommend medicine admission for IV abx and pain control     Discussed with Dr. Elias and Dr. Menjivar.    ACS/Trauma  [phone] (pager)

## 2024-12-21 LAB
-  COAGULASE NEGATIVE STAPHYLOCOCCUS: SIGNIFICANT CHANGE UP
ANION GAP SERPL CALC-SCNC: 13 MMOL/L — SIGNIFICANT CHANGE UP (ref 5–17)
APPEARANCE UR: CLEAR — SIGNIFICANT CHANGE UP
APTT BLD: 28.2 SEC — SIGNIFICANT CHANGE UP (ref 24.5–35.6)
BACTERIA # UR AUTO: NEGATIVE /HPF — SIGNIFICANT CHANGE UP
BILIRUB UR-MCNC: NEGATIVE — SIGNIFICANT CHANGE UP
BUN SERPL-MCNC: 14 MG/DL — SIGNIFICANT CHANGE UP (ref 7–23)
CALCIUM SERPL-MCNC: 8.4 MG/DL — SIGNIFICANT CHANGE UP (ref 8.4–10.5)
CAST: 0 /LPF — SIGNIFICANT CHANGE UP (ref 0–4)
CHLORIDE SERPL-SCNC: 101 MMOL/L — SIGNIFICANT CHANGE UP (ref 96–108)
CO2 SERPL-SCNC: 21 MMOL/L — LOW (ref 22–31)
COLOR SPEC: YELLOW — SIGNIFICANT CHANGE UP
CREAT SERPL-MCNC: 0.79 MG/DL — SIGNIFICANT CHANGE UP (ref 0.5–1.3)
DIFF PNL FLD: ABNORMAL
EGFR: 84 ML/MIN/1.73M2 — SIGNIFICANT CHANGE UP
GLUCOSE SERPL-MCNC: 111 MG/DL — HIGH (ref 70–99)
GLUCOSE UR QL: NEGATIVE MG/DL — SIGNIFICANT CHANGE UP
GRAM STN FLD: ABNORMAL
HCT VFR BLD CALC: 33.5 % — LOW (ref 39–50)
HGB BLD-MCNC: 10.2 G/DL — LOW (ref 13–17)
INR BLD: 1.27 RATIO — HIGH (ref 0.85–1.16)
KETONES UR-MCNC: NEGATIVE MG/DL — SIGNIFICANT CHANGE UP
LEUKOCYTE ESTERASE UR-ACNC: ABNORMAL
MCHC RBC-ENTMCNC: 30 PG — SIGNIFICANT CHANGE UP (ref 27–34)
MCHC RBC-ENTMCNC: 30.4 G/DL — LOW (ref 32–36)
MCV RBC AUTO: 98.5 FL — SIGNIFICANT CHANGE UP (ref 80–100)
METHOD TYPE: SIGNIFICANT CHANGE UP
NITRITE UR-MCNC: NEGATIVE — SIGNIFICANT CHANGE UP
NRBC # BLD: 0 /100 WBCS — SIGNIFICANT CHANGE UP (ref 0–0)
PH UR: 5.5 — SIGNIFICANT CHANGE UP (ref 5–8)
PLATELET # BLD AUTO: 294 K/UL — SIGNIFICANT CHANGE UP (ref 150–400)
POTASSIUM SERPL-MCNC: 3.7 MMOL/L — SIGNIFICANT CHANGE UP (ref 3.5–5.3)
POTASSIUM SERPL-SCNC: 3.7 MMOL/L — SIGNIFICANT CHANGE UP (ref 3.5–5.3)
PROT UR-MCNC: 30 MG/DL
PROTHROM AB SERPL-ACNC: 14.6 SEC — HIGH (ref 9.9–13.4)
RBC # BLD: 3.4 M/UL — LOW (ref 4.2–5.8)
RBC # FLD: 20.3 % — HIGH (ref 10.3–14.5)
RBC CASTS # UR COMP ASSIST: 4 /HPF — SIGNIFICANT CHANGE UP (ref 0–4)
REVIEW: SIGNIFICANT CHANGE UP
SODIUM SERPL-SCNC: 135 MMOL/L — SIGNIFICANT CHANGE UP (ref 135–145)
SP GR SPEC: 1.03 — SIGNIFICANT CHANGE UP (ref 1–1.03)
SPECIMEN SOURCE: SIGNIFICANT CHANGE UP
SQUAMOUS # UR AUTO: 3 /HPF — SIGNIFICANT CHANGE UP (ref 0–5)
URATE CRY FLD QL MICRO: PRESENT
UROBILINOGEN FLD QL: 1 MG/DL — SIGNIFICANT CHANGE UP (ref 0.2–1)
WBC # BLD: 7.09 K/UL — SIGNIFICANT CHANGE UP (ref 3.8–10.5)
WBC # FLD AUTO: 7.09 K/UL — SIGNIFICANT CHANGE UP (ref 3.8–10.5)
WBC UR QL: 14 /HPF — HIGH (ref 0–5)

## 2024-12-21 PROCEDURE — 99232 SBSQ HOSP IP/OBS MODERATE 35: CPT

## 2024-12-21 PROCEDURE — 99233 SBSQ HOSP IP/OBS HIGH 50: CPT

## 2024-12-21 RX ORDER — ACETAMINOPHEN 80 MG/.8ML
650 SOLUTION/ DROPS ORAL EVERY 6 HOURS
Refills: 0 | Status: DISCONTINUED | OUTPATIENT
Start: 2024-12-21 | End: 2024-12-25

## 2024-12-21 RX ORDER — SODIUM CHLORIDE 9 MG/ML
1000 INJECTION, SOLUTION INTRAVENOUS
Refills: 0 | Status: DISCONTINUED | OUTPATIENT
Start: 2024-12-21 | End: 2024-12-22

## 2024-12-21 RX ORDER — FUROSEMIDE 20 MG
20 TABLET ORAL DAILY
Refills: 0 | Status: DISCONTINUED | OUTPATIENT
Start: 2024-12-21 | End: 2024-12-21

## 2024-12-21 RX ADMIN — Medication 1 APPLICATION(S): at 05:45

## 2024-12-21 RX ADMIN — Medication 20 MILLIGRAM(S): at 17:52

## 2024-12-21 RX ADMIN — PIPERACILLIN AND TAZOBACTAM 25 GRAM(S): 3; .375 INJECTION, POWDER, LYOPHILIZED, FOR SOLUTION INTRAVENOUS at 06:25

## 2024-12-21 RX ADMIN — Medication 1 APPLICATION(S): at 17:29

## 2024-12-21 RX ADMIN — SODIUM CHLORIDE 50 MILLILITER(S): 9 INJECTION, SOLUTION INTRAVENOUS at 23:14

## 2024-12-21 RX ADMIN — PIPERACILLIN AND TAZOBACTAM 25 GRAM(S): 3; .375 INJECTION, POWDER, LYOPHILIZED, FOR SOLUTION INTRAVENOUS at 22:55

## 2024-12-21 RX ADMIN — PIPERACILLIN AND TAZOBACTAM 25 GRAM(S): 3; .375 INJECTION, POWDER, LYOPHILIZED, FOR SOLUTION INTRAVENOUS at 15:55

## 2024-12-21 RX ADMIN — SODIUM CHLORIDE 75 MILLILITER(S): 9 INJECTION, SOLUTION INTRAVENOUS at 02:56

## 2024-12-21 NOTE — PROGRESS NOTE ADULT - PROBLEM SELECTOR PLAN 2
- baseline appears to be aaox0-1  - c/w memantine daily   - palliative care consulted for GOC as patient is full code, however appears to be hospice appropriate due to advanced dementia and numerous hospitalizations  - Concern for aspiration risk, so S&S consulted, but prior GOC indicated feeding as tolerated. NPO for now - baseline appears to be aaox0-1  - c/w memantine daily   - palliative care consulted for GOC as patient is full code, however appears to be hospice appropriate due to advanced dementia and numerous hospitalizations  - NPO for now due to impending procedure, though past had tolerated Pureed with thickened per S&S eval at [place]

## 2024-12-21 NOTE — PROGRESS NOTE ADULT - PROBLEM SELECTOR PLAN 6
- Patient diagnosed with PE at [place] recently in past several weeks prior and was on a heparin drip and Eliquis on discharge. Hospital course complicated thigh hematoma  - Hold AC for now pending procedure, daughter not to keen on heparin infusion in the interim

## 2024-12-22 LAB
ALBUMIN SERPL ELPH-MCNC: 2.8 G/DL — LOW (ref 3.3–5)
ALP SERPL-CCNC: 86 U/L — SIGNIFICANT CHANGE UP (ref 40–120)
ALT FLD-CCNC: 29 U/L — SIGNIFICANT CHANGE UP (ref 10–45)
ANION GAP SERPL CALC-SCNC: 12 MMOL/L — SIGNIFICANT CHANGE UP (ref 5–17)
APTT BLD: 28.8 SEC — SIGNIFICANT CHANGE UP (ref 24.5–35.6)
AST SERPL-CCNC: 43 U/L — HIGH (ref 10–40)
BILIRUB SERPL-MCNC: 0.6 MG/DL — SIGNIFICANT CHANGE UP (ref 0.2–1.2)
BLD GP AB SCN SERPL QL: NEGATIVE — SIGNIFICANT CHANGE UP
BUN SERPL-MCNC: 10 MG/DL — SIGNIFICANT CHANGE UP (ref 7–23)
CALCIUM SERPL-MCNC: 8.3 MG/DL — LOW (ref 8.4–10.5)
CHLORIDE SERPL-SCNC: 103 MMOL/L — SIGNIFICANT CHANGE UP (ref 96–108)
CO2 SERPL-SCNC: 22 MMOL/L — SIGNIFICANT CHANGE UP (ref 22–31)
CREAT SERPL-MCNC: 0.74 MG/DL — SIGNIFICANT CHANGE UP (ref 0.5–1.3)
CULTURE RESULTS: ABNORMAL
EGFR: 86 ML/MIN/1.73M2 — SIGNIFICANT CHANGE UP
GLUCOSE SERPL-MCNC: 94 MG/DL — SIGNIFICANT CHANGE UP (ref 70–99)
HCT VFR BLD CALC: 33.8 % — LOW (ref 39–50)
HGB BLD-MCNC: 10.5 G/DL — LOW (ref 13–17)
INR BLD: 1.29 RATIO — HIGH (ref 0.85–1.16)
MAGNESIUM SERPL-MCNC: 2.1 MG/DL — SIGNIFICANT CHANGE UP (ref 1.6–2.6)
MCHC RBC-ENTMCNC: 29.7 PG — SIGNIFICANT CHANGE UP (ref 27–34)
MCHC RBC-ENTMCNC: 31.1 G/DL — LOW (ref 32–36)
MCV RBC AUTO: 95.5 FL — SIGNIFICANT CHANGE UP (ref 80–100)
NRBC # BLD: 0 /100 WBCS — SIGNIFICANT CHANGE UP (ref 0–0)
ORGANISM # SPEC MICROSCOPIC CNT: ABNORMAL
ORGANISM # SPEC MICROSCOPIC CNT: ABNORMAL
PHOSPHATE SERPL-MCNC: 2.9 MG/DL — SIGNIFICANT CHANGE UP (ref 2.5–4.5)
PLATELET # BLD AUTO: 300 K/UL — SIGNIFICANT CHANGE UP (ref 150–400)
POTASSIUM SERPL-MCNC: 3.5 MMOL/L — SIGNIFICANT CHANGE UP (ref 3.5–5.3)
POTASSIUM SERPL-SCNC: 3.5 MMOL/L — SIGNIFICANT CHANGE UP (ref 3.5–5.3)
PROT SERPL-MCNC: 6.6 G/DL — SIGNIFICANT CHANGE UP (ref 6–8.3)
PROTHROM AB SERPL-ACNC: 14.7 SEC — HIGH (ref 9.9–13.4)
RBC # BLD: 3.54 M/UL — LOW (ref 4.2–5.8)
RBC # FLD: 19.7 % — HIGH (ref 10.3–14.5)
RH IG SCN BLD-IMP: POSITIVE — SIGNIFICANT CHANGE UP
SODIUM SERPL-SCNC: 137 MMOL/L — SIGNIFICANT CHANGE UP (ref 135–145)
SPECIMEN SOURCE: SIGNIFICANT CHANGE UP
WBC # BLD: 5.53 K/UL — SIGNIFICANT CHANGE UP (ref 3.8–10.5)
WBC # FLD AUTO: 5.53 K/UL — SIGNIFICANT CHANGE UP (ref 3.8–10.5)

## 2024-12-22 PROCEDURE — 99233 SBSQ HOSP IP/OBS HIGH 50: CPT

## 2024-12-22 RX ORDER — INFLUENZA A VIRUS A/WISCONSIN/588/2019 (H1N1) RECOMBINANT HEMAGGLUTININ ANTIGEN, INFLUENZA A VIRUS A/DARWIN/6/2021 (H3N2) RECOMBINANT HEMAGGLUTININ ANTIGEN, INFLUENZA B VIRUS B/AUSTRIA/1359417/2021 RECOMBINANT HEMAGGLUTININ ANTIGEN, AND INFLUENZA B VIRUS B/PHUKET/3073/2013 RECOMBINANT HEMAGGLUTININ ANTIGEN 45; 45; 45; 45 UG/.5ML; UG/.5ML; UG/.5ML; UG/.5ML
0.5 INJECTION INTRAMUSCULAR ONCE
Refills: 0 | Status: DISCONTINUED | OUTPATIENT
Start: 2024-12-22 | End: 2024-12-25

## 2024-12-22 RX ADMIN — PIPERACILLIN AND TAZOBACTAM 25 GRAM(S): 3; .375 INJECTION, POWDER, LYOPHILIZED, FOR SOLUTION INTRAVENOUS at 22:14

## 2024-12-22 RX ADMIN — ENALAPRIL MALEATE 20 MILLIGRAM(S): 10 TABLET ORAL at 06:31

## 2024-12-22 RX ADMIN — ATORVASTATIN CALCIUM 20 MILLIGRAM(S): 40 TABLET, FILM COATED ORAL at 22:21

## 2024-12-22 RX ADMIN — LEVOTHYROXINE SODIUM 112 MICROGRAM(S): 175 TABLET ORAL at 06:31

## 2024-12-22 RX ADMIN — CARVEDILOL 12.5 MILLIGRAM(S): 25 TABLET, FILM COATED ORAL at 17:31

## 2024-12-22 RX ADMIN — CARVEDILOL 12.5 MILLIGRAM(S): 25 TABLET, FILM COATED ORAL at 06:31

## 2024-12-22 RX ADMIN — PIPERACILLIN AND TAZOBACTAM 25 GRAM(S): 3; .375 INJECTION, POWDER, LYOPHILIZED, FOR SOLUTION INTRAVENOUS at 14:27

## 2024-12-22 RX ADMIN — MEMANTINE HYDROCHLORIDE 10 MILLIGRAM(S): 14 CAPSULE, EXTENDED RELEASE ORAL at 06:28

## 2024-12-22 RX ADMIN — Medication 1 APPLICATION(S): at 07:35

## 2024-12-22 RX ADMIN — PIPERACILLIN AND TAZOBACTAM 25 GRAM(S): 3; .375 INJECTION, POWDER, LYOPHILIZED, FOR SOLUTION INTRAVENOUS at 05:29

## 2024-12-22 RX ADMIN — MEMANTINE HYDROCHLORIDE 10 MILLIGRAM(S): 14 CAPSULE, EXTENDED RELEASE ORAL at 17:31

## 2024-12-22 RX ADMIN — Medication 1 APPLICATION(S): at 18:38

## 2024-12-22 NOTE — PROGRESS NOTE ADULT - PROBLEM SELECTOR PLAN 6
- Patient diagnosed with PE at [place] recently in past several weeks prior and was on a heparin drip and Eliquis on discharge. Hospital course complicated thigh hematoma  - Hold AC for now pending procedure, daughter not too keen on heparin infusion in the interim  - He was discharged on Eliquis 2.5 mg BID at a reduced dose likely due to bleed. Will resume this when feasible - Patient diagnosed with PE at [place] recently in past several weeks prior and was on a heparin drip and Eliquis on discharge. Hospital course complicated thigh hematoma  - Hold AC for now pending procedure, daughter not too keen on heparin infusion in the interim  - He was discharged on Eliquis 2.5 mg BID at a reduced dose likely due to bleed. Will resume this when feasible  - Our CTA chest w/ no PE. Pending LE Duplex

## 2024-12-22 NOTE — PROGRESS NOTE ADULT - PROBLEM SELECTOR PLAN 2
- baseline appears to be aaox0-1  - c/w memantine daily   - palliative care consulted for [facility] as patient is full code, however appears to be hospice appropriate due to advanced dementia and numerous hospitalizations  - NPO for now due to impending procedure, though past had tolerated Pureed with thickened per S&S eval at [place]. Discussed with daughter and she does not wish to pursue further S&S eval. Will maintain pureed with thickened while here  - Wound care following for decubitus ulcer present on admission. Also has severe protein-calorie malnutrition

## 2024-12-23 LAB
ALBUMIN SERPL ELPH-MCNC: 2.8 G/DL — LOW (ref 3.3–5)
ALP SERPL-CCNC: 77 U/L — SIGNIFICANT CHANGE UP (ref 40–120)
ALT FLD-CCNC: 28 U/L — SIGNIFICANT CHANGE UP (ref 10–45)
ANION GAP SERPL CALC-SCNC: 12 MMOL/L — SIGNIFICANT CHANGE UP (ref 5–17)
APTT BLD: 27.6 SEC — SIGNIFICANT CHANGE UP (ref 24.5–35.6)
AST SERPL-CCNC: 37 U/L — SIGNIFICANT CHANGE UP (ref 10–40)
BILIRUB SERPL-MCNC: 0.4 MG/DL — SIGNIFICANT CHANGE UP (ref 0.2–1.2)
BUN SERPL-MCNC: 10 MG/DL — SIGNIFICANT CHANGE UP (ref 7–23)
CALCIUM SERPL-MCNC: 8 MG/DL — LOW (ref 8.4–10.5)
CHLORIDE SERPL-SCNC: 105 MMOL/L — SIGNIFICANT CHANGE UP (ref 96–108)
CO2 SERPL-SCNC: 21 MMOL/L — LOW (ref 22–31)
CREAT SERPL-MCNC: 0.7 MG/DL — SIGNIFICANT CHANGE UP (ref 0.5–1.3)
EGFR: 88 ML/MIN/1.73M2 — SIGNIFICANT CHANGE UP
GLUCOSE SERPL-MCNC: 92 MG/DL — SIGNIFICANT CHANGE UP (ref 70–99)
HCT VFR BLD CALC: 35 % — LOW (ref 39–50)
HGB BLD-MCNC: 10.8 G/DL — LOW (ref 13–17)
INR BLD: 1.2 RATIO — HIGH (ref 0.85–1.16)
MAGNESIUM SERPL-MCNC: 2.1 MG/DL — SIGNIFICANT CHANGE UP (ref 1.6–2.6)
MCHC RBC-ENTMCNC: 29.7 PG — SIGNIFICANT CHANGE UP (ref 27–34)
MCHC RBC-ENTMCNC: 30.9 G/DL — LOW (ref 32–36)
MCV RBC AUTO: 96.2 FL — SIGNIFICANT CHANGE UP (ref 80–100)
NRBC # BLD: 0 /100 WBCS — SIGNIFICANT CHANGE UP (ref 0–0)
PHOSPHATE SERPL-MCNC: 2.3 MG/DL — LOW (ref 2.5–4.5)
PLATELET # BLD AUTO: 277 K/UL — SIGNIFICANT CHANGE UP (ref 150–400)
POTASSIUM SERPL-MCNC: 3.5 MMOL/L — SIGNIFICANT CHANGE UP (ref 3.5–5.3)
POTASSIUM SERPL-SCNC: 3.5 MMOL/L — SIGNIFICANT CHANGE UP (ref 3.5–5.3)
PROT SERPL-MCNC: 6.5 G/DL — SIGNIFICANT CHANGE UP (ref 6–8.3)
PROTHROM AB SERPL-ACNC: 13.6 SEC — HIGH (ref 9.9–13.4)
RBC # BLD: 3.64 M/UL — LOW (ref 4.2–5.8)
RBC # FLD: 19.4 % — HIGH (ref 10.3–14.5)
SODIUM SERPL-SCNC: 138 MMOL/L — SIGNIFICANT CHANGE UP (ref 135–145)
WBC # BLD: 5.73 K/UL — SIGNIFICANT CHANGE UP (ref 3.8–10.5)
WBC # FLD AUTO: 5.73 K/UL — SIGNIFICANT CHANGE UP (ref 3.8–10.5)

## 2024-12-23 PROCEDURE — 99232 SBSQ HOSP IP/OBS MODERATE 35: CPT

## 2024-12-23 PROCEDURE — 99233 SBSQ HOSP IP/OBS HIGH 50: CPT

## 2024-12-23 RX ADMIN — MEMANTINE HYDROCHLORIDE 10 MILLIGRAM(S): 14 CAPSULE, EXTENDED RELEASE ORAL at 05:40

## 2024-12-23 RX ADMIN — Medication 1 APPLICATION(S): at 05:40

## 2024-12-23 RX ADMIN — PIPERACILLIN AND TAZOBACTAM 25 GRAM(S): 3; .375 INJECTION, POWDER, LYOPHILIZED, FOR SOLUTION INTRAVENOUS at 22:23

## 2024-12-23 RX ADMIN — CARVEDILOL 12.5 MILLIGRAM(S): 25 TABLET, FILM COATED ORAL at 17:53

## 2024-12-23 RX ADMIN — LEVOTHYROXINE SODIUM 112 MICROGRAM(S): 175 TABLET ORAL at 05:40

## 2024-12-23 RX ADMIN — Medication 1 APPLICATION(S): at 17:52

## 2024-12-23 RX ADMIN — MEMANTINE HYDROCHLORIDE 10 MILLIGRAM(S): 14 CAPSULE, EXTENDED RELEASE ORAL at 17:52

## 2024-12-23 RX ADMIN — PIPERACILLIN AND TAZOBACTAM 25 GRAM(S): 3; .375 INJECTION, POWDER, LYOPHILIZED, FOR SOLUTION INTRAVENOUS at 13:07

## 2024-12-23 RX ADMIN — ATORVASTATIN CALCIUM 20 MILLIGRAM(S): 40 TABLET, FILM COATED ORAL at 20:58

## 2024-12-23 RX ADMIN — ENALAPRIL MALEATE 20 MILLIGRAM(S): 10 TABLET ORAL at 05:40

## 2024-12-23 RX ADMIN — CARVEDILOL 12.5 MILLIGRAM(S): 25 TABLET, FILM COATED ORAL at 05:40

## 2024-12-23 RX ADMIN — PIPERACILLIN AND TAZOBACTAM 25 GRAM(S): 3; .375 INJECTION, POWDER, LYOPHILIZED, FOR SOLUTION INTRAVENOUS at 05:38

## 2024-12-23 NOTE — PROGRESS NOTE ADULT - PROBLEM SELECTOR PLAN 6
- Patient diagnosed with PE at [place] recently in past several weeks prior and was on a heparin drip and Eliquis on discharge. Hospital course complicated thigh hematoma  - Hold AC for now pending procedure, daughter not too keen on heparin infusion in the interim  - He was discharged on Eliquis 2.5 mg BID at a reduced dose likely due to bleed. Will resume this after drain placement.  - Our CTA chest w/ no PE. Pending LE Duplex

## 2024-12-23 NOTE — PROGRESS NOTE ADULT - PROBLEM SELECTOR PLAN 2
- baseline appears to be aaox0-1  - c/w memantine daily   - palliative care consulted for [facility] as patient is full code, however appears to be hospice appropriate due to advanced dementia and numerous hospitalizations  - NPO for now due to impending procedure, though past had tolerated Pureed with thickened per S&S eval at [place]. Discussed with daughter and she does not wish to pursue further S&S eval. Will maintain pureed with thickened while here  - Wound care following for decubitus ulcer present on admission. Also has severe protein-calorie malnutrition

## 2024-12-24 ENCOUNTER — TRANSCRIPTION ENCOUNTER (OUTPATIENT)
Age: 88
End: 2024-12-24

## 2024-12-24 DIAGNOSIS — Z51.5 ENCOUNTER FOR PALLIATIVE CARE: ICD-10-CM

## 2024-12-24 DIAGNOSIS — R53.2 FUNCTIONAL QUADRIPLEGIA: ICD-10-CM

## 2024-12-24 DIAGNOSIS — Z71.89 OTHER SPECIFIED COUNSELING: ICD-10-CM

## 2024-12-24 PROCEDURE — 99232 SBSQ HOSP IP/OBS MODERATE 35: CPT

## 2024-12-24 PROCEDURE — 99233 SBSQ HOSP IP/OBS HIGH 50: CPT

## 2024-12-24 PROCEDURE — 99497 ADVNCD CARE PLAN 30 MIN: CPT | Mod: 25

## 2024-12-24 PROCEDURE — 99223 1ST HOSP IP/OBS HIGH 75: CPT

## 2024-12-24 RX ORDER — ASCORBIC ACID 1000 MG
500 TABLET ORAL DAILY
Refills: 0 | Status: DISCONTINUED | OUTPATIENT
Start: 2024-12-24 | End: 2024-12-25

## 2024-12-24 RX ORDER — ASCORBIC ACID 1000 MG
1 TABLET ORAL
Qty: 0 | Refills: 0 | DISCHARGE
Start: 2024-12-24

## 2024-12-24 RX ORDER — B COMPLEX, C NO.20/FOLIC ACID 1 MG
1 CAPSULE ORAL
Qty: 0 | Refills: 0 | DISCHARGE
Start: 2024-12-24

## 2024-12-24 RX ORDER — B COMPLEX, C NO.20/FOLIC ACID 1 MG
1 CAPSULE ORAL DAILY
Refills: 0 | Status: DISCONTINUED | OUTPATIENT
Start: 2024-12-24 | End: 2024-12-25

## 2024-12-24 RX ORDER — ACETAMINOPHEN 80 MG/.8ML
2 SOLUTION/ DROPS ORAL
Qty: 0 | Refills: 0 | DISCHARGE
Start: 2024-12-24

## 2024-12-24 RX ADMIN — Medication 1 APPLICATION(S): at 05:43

## 2024-12-24 RX ADMIN — Medication 500 MILLIGRAM(S): at 18:37

## 2024-12-24 RX ADMIN — MEMANTINE HYDROCHLORIDE 10 MILLIGRAM(S): 14 CAPSULE, EXTENDED RELEASE ORAL at 18:37

## 2024-12-24 RX ADMIN — Medication 1 TABLET(S): at 18:37

## 2024-12-24 RX ADMIN — PIPERACILLIN AND TAZOBACTAM 25 GRAM(S): 3; .375 INJECTION, POWDER, LYOPHILIZED, FOR SOLUTION INTRAVENOUS at 05:43

## 2024-12-24 RX ADMIN — ATORVASTATIN CALCIUM 20 MILLIGRAM(S): 40 TABLET, FILM COATED ORAL at 21:46

## 2024-12-24 RX ADMIN — LEVOTHYROXINE SODIUM 112 MICROGRAM(S): 175 TABLET ORAL at 05:43

## 2024-12-24 RX ADMIN — PIPERACILLIN AND TAZOBACTAM 25 GRAM(S): 3; .375 INJECTION, POWDER, LYOPHILIZED, FOR SOLUTION INTRAVENOUS at 21:46

## 2024-12-24 RX ADMIN — CARVEDILOL 12.5 MILLIGRAM(S): 25 TABLET, FILM COATED ORAL at 18:37

## 2024-12-24 RX ADMIN — PIPERACILLIN AND TAZOBACTAM 25 GRAM(S): 3; .375 INJECTION, POWDER, LYOPHILIZED, FOR SOLUTION INTRAVENOUS at 14:10

## 2024-12-24 RX ADMIN — CARVEDILOL 12.5 MILLIGRAM(S): 25 TABLET, FILM COATED ORAL at 05:43

## 2024-12-24 RX ADMIN — Medication 1 APPLICATION(S): at 18:37

## 2024-12-24 RX ADMIN — MEMANTINE HYDROCHLORIDE 10 MILLIGRAM(S): 14 CAPSULE, EXTENDED RELEASE ORAL at 05:43

## 2024-12-24 RX ADMIN — ENALAPRIL MALEATE 20 MILLIGRAM(S): 10 TABLET ORAL at 05:43

## 2024-12-24 NOTE — DISCHARGE NOTE PROVIDER - CARE PROVIDER_API CALL
Tyrell Perales  Phone: [phone]  Fax: (   )    -  Established Patient  Follow Up Time: 1 week

## 2024-12-24 NOTE — CONSULT NOTE ADULT - CONVERSATION DETAILS
Spoke with pt's daughter via phone. Introduced myself and the role of palliative care. Larisa demonstrated a clear understanding of her  father's illness. She shared he was recently discharged from [place] and is hopeful for a transfer for continuity of care. If pt is unable to be transferred then she would like to speak with anesthesia prior to consenting to perc ulises procedure.   Discussed pt's functional and mental status prior to admission. Larisa shared her father was ambulating until the summer when he had an admission for a hip abscess. Since admission he has been primarily bedbound. Home PT was recently started to try and improve contracted legs. Larisa verbalized she remains hopeful for some improvement in functional status. Pt's b/l mental status is A&Ox1, able to make needs known, and recognizes family members.   Discussed Alzheimer's and reviewed that it is a progressive chronic illness and physical/mental decline can be associated with the disease process. Shared at this stage of his illness he may be hospice eligible if invasive procedures and aggressive interventions were not in line GOC and symptom directed care was the priority. At this time Larisa does not feel her father is ready for hospice nor would it be in line with his GOC.   Reviewed advanced directives, Larisa shared she is her father's HCP, requested documentation be provided. She confirmed there is no living will or MOLST completed. Discussed CPR and intubation, and the concern that pt's mental/functional status would be negatively impacted by these interventions. Discussed option of continuing with procedure and ongoing medical care but in the event a significant event occurred allowing a natural passing. Larisa verbalized understanding but feels as though her father would want to remain full code. Discussed need for ongoing discussions regarding GOC based on clinical status.   Reviewed disposition following hospitalization. Pt lives at home with Larisa. She is hopeful for the pt to return home with home care services as well as home PT. Emotional support provided.

## 2024-12-24 NOTE — CONSULT NOTE ADULT - CONSULT REASON
cholecystitis
Acute cholecystitis
CHF
percutaneous cholecystostomy tube placement
sacral/bilateral buttocks, Right and left hips and right heel skin injuries
GOC

## 2024-12-24 NOTE — DIETITIAN INITIAL EVALUATION ADULT - PROBLEM SELECTOR PLAN 3
- appears euvolemic, on room air  - CXR with small b/l pleural effusions  - pro bnp >20,000  - resume lasix 20 mg po daily  - cards dr peck consulted, he has seen patient last month at [facility]

## 2024-12-24 NOTE — DISCHARGE NOTE NURSING/CASE MANAGEMENT/SOCIAL WORK - FINANCIAL ASSISTANCE
[facility] provides services at a reduced cost to those who are determined to be eligible through [facility]’s financial assistance program. Information regarding [facility]’s financial assistance program can be found by going to [email or web address] or by calling [phone].

## 2024-12-24 NOTE — DISCHARGE NOTE PROVIDER - PROVIDER TOKENS
FREE:[LAST:[Perales],FIRST:[Tyrell],PHONE:[phone]],FAX:[(   )    -],FOLLOWUP:[1 week],ESTABLISHEDPATIENT:[T]]

## 2024-12-24 NOTE — PROGRESS NOTE ADULT - PROBLEM SELECTOR PLAN 2
- baseline appears to be aaox0-1  - c/w memantine daily   - palliative care consulted-- daughter confirmed full code   - Pureed with thickened diet  - Wound care following for decubitus ulcer present on admission. Also has severe protein-calorie malnutrition

## 2024-12-24 NOTE — CONSULT NOTE ADULT - SUBJECTIVE AND OBJECTIVE BOX
Date of Service: 12-24-24 @ 09:11    HPI: 89 year old male with advanced Alzheimer's dementia, hx of thyroid cancer s/p thyroidectomy, prostate cancer s/p prostatectomy, HFrEF, HTN, HLD  presents from home with nausea, vomiting and poor PO intake x 1 day admitted for acute cholecystitis awaiting perc ulises drain. (Taken from internal medicine note). Palliative consulted for assistance with GOC.       PERTINENT PM/SXH:   Dementia    HTN (hypertension)    HLD (hyperlipidemia)    Thyroid cancer    Prostate cancer      S/P thyroidectomy    S/P prostatectomy      FAMILY HISTORY:  Family history of pancreatic cancer (Child)        ITEMS NOT CHECKED ARE NOT PRESENT    SOCIAL HISTORY:   Significant other/partner[ ]  Children[x ]  Jainism/Spirituality:  Substance hx:  [ ]   Tobacco hx:  [ ]   Alcohol hx: [ ]   Home Opioid hx:  [ ] I-Stop Reference No:  Living Situation: [x ]Home  [ ]Long term care  [ ]Rehab [ ]Other    ADVANCE DIRECTIVES:    DNR/MOLST  [ ]  Living Will  [ ]   DECISION MAKER(s):  [ ] Health Care Proxy(s)  [ x] Surrogate(s)  [ ] Guardian           Name(s): Phone Number(s): Larisa Wong [phone]    BASELINE (I)ADL(s) (prior to admission):  [place]: [ ]Total  [ ] Moderate [x ]Dependent    Allergies    No Known Allergies    Intolerances    MEDICATIONS  (STANDING):  atorvastatin 20 milliGRAM(s) Oral at bedtime  carvedilol 12.5 milliGRAM(s) Oral every 12 hours  Dakins Solution - 1/4 Strength 1 Application(s) Topical two times a day  enalapril 20 milliGRAM(s) Oral daily  influenza  Vaccine (HIGH DOSE) 0.5 milliLiter(s) IntraMuscular once  levothyroxine 112 MICROGram(s) Oral daily  memantine 10 milliGRAM(s) Oral two times a day  piperacillin/tazobactam IVPB.. 3.375 Gram(s) IV Intermittent every 8 hours    MEDICATIONS  (PRN):  acetaminophen     Tablet .. 650 milliGRAM(s) Oral every 6 hours PRN Temp greater or equal to 38C (100.4F), Mild Pain (1 - 3)    PRESENT SYMPTOMS: [ ]Unable to self-report see CPOT, PAINADs, RDOS  Source if other than patient:  [ ]Family   [ ]Team     Pain: [ ]yes [x ]no  QOL impact -   Location -                    Aggravating factors -  Quality -  Radiation -  Timing-  Severity (0-10 scale):  Minimal acceptable level (0-10 scale):       Dyspnea:                           [ ]Mild [ ]Moderate [ ]Severe None   Anxiety:                             [ ]Mild [ ]Moderate [ ]Severe  Fatigue:                             [ ]Mild [ ]Moderate [ ]Severe  Nausea:                             [ ]Mild [ ]Moderate [ ]Severe  Loss of appetite:              [ ]Mild [ ]Moderate [ ]Severe  Constipation:                    [ ]Mild [ ]Moderate [ ]Severe    PCSSQ [Palliative Care Spiritual Screening Question]   Severity (0-10):  Score of 4 or > indicate consideration of Chaplaincy referral.  Chaplaincy Referral: [ ] yes [ ] refused [ ] following    Caregiver [place]? : [ ] yes [ ] no [ ] deferred:  Social work referral [ ] Patient & Family Centered Care Referral [ ]     Anticipatory Grief Present?: [ ] yes [ ] no  [ ] deferred: Palliative Social work referral [ ]  Patient & Family Centered Care Referral [ ]       Other Symptoms:  [ ]All other review of systems negative   [ x] Unable to obtain due to poor mentation    PHYSICAL EXAM:  Vital Signs Last 24 Hrs  T(C): 36.7 (24 Dec 2024 08:05), Max: 36.8 (24 Dec 2024 04:57)  T(F): 98.1 (24 Dec 2024 08:05), Max: 98.2 (24 Dec 2024 04:57)  HR: 73 (24 Dec 2024 08:05) (61 - 85)  BP: 147/68 (24 Dec 2024 08:05) (147/68 - 165/87)  BP(mean): --  RR: 18 (24 Dec 2024 08:05) (17 - 18)  SpO2: 97% (24 Dec 2024 08:05) (94% - 99%)    Parameters below as of 24 Dec 2024 08:05  Patient On (Oxygen Delivery Method): room air     I&O's Summary    23 Dec 2024 07:01  -  24 Dec 2024 07:00  --------------------------------------------------------  IN: 120 mL / OUT: 400 mL / NET: -280 mL        GENERAL:  [ ]Alert  [x]Oriented x 1  [ x]Lethargic  [ ]Cachexia  [ ]Unarousable  [x]Verbal  [ ]Non-Verbal  Behavioral:   [ ]Anxiety  [ ]Delirium [ ]Agitation [ ]Other  HEENT:  [ ]Normal   [x ]Dry mouth   [ ]ET Tube/Trach  [ ]Oral lesions  PULMONARY:   [ ]Clear [ ]Tachypnea  [ ]Audible excessive secretions   [ ]Rhonchi        [ ]Right [ ]Left [ ]Bilateral  [ ]Crackles        [ ]Right [ ]Left [ ]Bilateral  [ ]Wheezing     [ ]Right [ ]Left [ ]Bilateral  [x ]Diminished BS [ ] Right [ ]Left [x ]Bilateral  CARDIOVASCULAR:    [x]Regular [ ]Irregular [ ]Tachy  [ ]Addi [ ]Murmur [ ]Other  GASTROINTESTINAL:  [x]Soft  [ ]Distended   [x]+BS  [x]Non tender [ ]Tender  [ ]PEG [ ]OGT/ NGT   Last BM:    GENITOURINARY:  [ ]Normal [x ]Incontinent   [ ]Oliguria/Anuria   [ ][place]  MUSCULOSKELETAL:   [ ]Normal   [x]Weakness  [x ]Bed/Wheelchair bound [ ]Edema  NEUROLOGIC:   [ ]No focal deficits  [x ] Cognitive impairment  [ ] Dysphagia [ ]Dysarthria [ ] Paresis [ ]Other   SKIN:   [ ]Normal  [ ]Rash   [ ]Pressure ulcer(s) [ ]y [ ]n present on admission    CRITICAL CARE:  [ ] Shock Present  [ ]Septic [ ]Cardiogenic [ ]Neurologic [ ]Hypovolemic  [ ]  Vasopressors [ ]  Inotropes   [ ]Respiratory failure present [ ]Mechanical ventilation [ ]Non-invasive ventilatory support [ ]High flow    [ ]Acute  [ ]Chronic [ ]Hypoxic  [ ]Hypercarbic [ ]Other  [ ]Other organ failure     LABS:                        10.8   5.73  )-----------( 277      ( 23 Dec 2024 06:20 )             35.0   12-23    138  |  105  |  10  ----------------------------<  92  3.5   |  21[L]  |  0.70    Ca    8.0[L]      23 Dec 2024 06:20  Phos  2.3     12-23  Mg     2.1     12-23    TPro  6.5  /  Alb  2.8[L]  /  TBili  0.4  /  DBili  x   /  AST  37  /  ALT  28  /  AlkPhos  77  12-23  PT/INR - ( 23 Dec 2024 06:20 )   PT: 13.6 sec;   INR: 1.20 ratio         PTT - ( 23 Dec 2024 06:20 )  PTT:27.6 sec    Urinalysis Basic - ( 23 Dec 2024 06:20 )    Color: x / Appearance: x / SG: x / pH: x  Gluc: 92 mg/dL / Ketone: x  / Bili: x / Urobili: x   Blood: x / Protein: x / Nitrite: x   Leuk Esterase: x / RBC: x / WBC x   Sq Epi: x / Non Sq Epi: x / Bacteria: x      RADIOLOGY & ADDITIONAL STUDIES:  < from: NM Hepatobiliary Imaging w/ RX (12.20.24 @ 19:44) >    ACC: [id number] EXAM:  NM HEPATOBILIARY IMG W RX   ORDERED BY:  BENTLEY ORTIZ     PROCEDURE DATE:  12/20/2024          INTERPRETATION:  CLINICAL INFORMATION: confirm acute ulises    RADIOPHARMACEUTICAL: 6.0 mCi Tc-99m-Mebrofenin, I.V., 2 doses  PHARMACOLOGIC INTERVENTION: Morphine 4 mg IV x1    TECHNIQUE: Dynamic imaging of the anterior abdomen was performed   following radiopharmaceutical injection. Static images of the abdomen in   the anterior, right anterior oblique, and right lateral viewswere   obtained immediately thereafter.    COMPARISON: None    OTHER STUDIES USED FOR CORRELATION: None    FINDINGS: There is prompt, homogeneous uptake of radiopharmaceutical by   the hepatocytes. Activity is first seen in the bowel at about 15 minutes.   The gallbladder is not visualized at any time during the study, despite   morphine administration. There is good clearance of activity from the   liver at the end of the study.    IMPRESSION: Findings compatible with acute cholecystitis.    Findings were discussed with VIJAY House 12/20/2024 7:55 PM by Dr. Jose Titus with read back confirmation.    --- End of Report ---             JOSE TITUS MD; Attending Radiologist  This document has been electronically signed. Dec 20 57813:55PM    < end of copied text >    PROTEIN CALORIE MALNUTRITION PRESENT: [ ]mild [ ]moderate [ ]severe [ ]underweight [ ]morbid obesity  https://www.andeal.org/vault/1992/web/files/ONC/Table_Clinical%20Characteristics%20to%20Document%20Malnutrition-White%20JV%20et%20al%202012.pdf    Height (cm): 182.9 (12-19-24 @ 19:30), 182.9 (11-04-24 @ 00:15)  Weight (kg): 72.6 (12-19-24 @ 19:30), 90.7 (11-04-24 @ 00:15)  BMI (kg/m2): 21.7 (12-19-24 @ 19:30), 27.1 (11-04-24 @ 00:15)    [x ]PPSV2 < or = to 30% [ ]significant weight loss  [ ]poor nutritional intake  [ ]anasarca[ ]Artificial Nutrition      Other REFERRALS:  [ ]Hospice  [ ]Child Life  [ ]Social Work  [ ]Case management [ ]Holistic Therapy     Care Coordination Assessment 201 [C. Provider] (12-20-24 @ 12:08)      Palliative Performance Scale:  http://npcrc.org/files/news/palliative_performance_scale_ppsv2.pdf  (Ctrl +  left click to view)  Respiratory Distress Observation Tool:  https://homecareinformation.net/handouts/hen/Respiratory_Distress_Observation_Scale.pdf (Ctrl +  left click to view)  PAINAD Score:  http://geriatrictoolkit.[place].[facility]/cog/painad.pdf (Ctrl +  left click to view)

## 2024-12-24 NOTE — DIETITIAN INITIAL EVALUATION ADULT - % CHANGE
Visit Information Date & Time Provider Department Dept. Phone Encounter #  
 2/9/2017 11:15 AM Christine Lo MD [facility] [address] [id number] Your Appointments 4/18/2017 11:00 AM  
New Patient with Herminia Gipson MD  
[facility] Internal Medicine [facility]) Appt Note: get est  
 [address] [facility] [address]  
[address] [facility] [place] [address] Upcoming Health Maintenance Date Due Hepatitis C Screening 1945 DTaP/Tdap/Td series (1 - Tdap) 1/19/1966 BREAST CANCER SCRN MAMMOGRAM 1/19/1995 FOBT Q 1 YEAR AGE 50-75 1/19/1995 ZOSTER VACCINE AGE 60> 1/19/2005 GLAUCOMA SCREENING Q2Y 1/19/2010 OSTEOPOROSIS SCREENING (DEXA) 1/19/2010 MEDICARE YEARLY EXAM 1/19/2010 Allergies as of 2/9/2017  Review Complete On: 2/9/2017 By: Christine Lo MD  
 No Known Allergies Current Immunizations  Reviewed on 2/9/2017 Name Date Pneumococcal Conjugate (PCV-13) 5/6/2015 Pneumococcal Polysaccharide (PPSV-23) 8/9/2013 Zoster Vaccine, Live 5/9/2016 Reviewed by Kayla Frazier LPN on 2/8/8531 at 49:23 AM  
 Reviewed by Kayla Frazier LPN on 5/9/8647 at 55:23 AM  
You Were Diagnosed With   
  
 Codes Comments Essential hypertension    -  Primary ICD-10-CM: I10 
ICD-9-CM: 401.9 Mixed hyperlipidemia     ICD-10-CM: E78.2 ICD-9-CM: 272.2 Acquired hypothyroidism     ICD-10-CM: E03.9 ICD-9-CM: 202. 9 Vitamin D deficiency     ICD-10-CM: E55.9 ICD-9-CM: 268.9 Obstructive sleep apnea syndrome     ICD-10-CM: G47.33 
ICD-9-CM: 327.23 Pernicious anemia     ICD-10-CM: D51.0 ICD-9-CM: 281.0 Other elevated white blood cell (WBC) count     ICD-10-CM: F16.796 Breast cancer screening     ICD-10-CM: Z12.39 
ICD-9-CM: V76.10  Need for hepatitis C screening test     ICD-10-CM: Z11.59 
ICD-9-CM: V73.89   
 Glucose intolerance (impaired glucose tolerance)     ICD-10-CM: R73.02 
ICD-9-CM: 790.22 Vitals BP Pulse Temp Resp Height(growth percentile) Weight(growth percentile) 159/77 (BP 1 Location: Left arm, BP Patient Position: Sitting) 60 97.6 °F (36.4 °C) (Oral) 20 5' 5.5\" (1.664 m) 197 lb (89.4 kg) SpO2 BMI OB Status Smoking Status 99% 32.28 kg/m2 Hysterectomy Never Smoker Vitals History BMI and BSA Data Body Mass Index Body Surface Area  
 32.28 kg/m 2 2.03 m 2 Preferred Pharmacy Pharmacy Name Phone [facility], [address] [phone] Your Updated Medication List  
  
   
This list is accurate as of: 2/9/17 12:20 PM.  Always use your most recent med list. amLODIPine-benazepril 5-10 mg per capsule Commonly known as:  LOTREL  
  
 hydroCHLOROthiazide 12.5 mg tablet Commonly known as:  HYDRODIURIL  
  
 levothyroxine 75 mcg tablet Commonly known as:  SYNTHROID  
  
 omeprazole 20 mg capsule Commonly known as:  PRILOSEC Take 20 mg by mouth daily. oxybutynin chloride XL 15 mg CR tablet Commonly known as:  DITROPAN XL  
  
 simvastatin 20 mg tablet Commonly known as:  ZOCOR We Performed the Following CBC W/O DIFF [73214 CPT(R)] HEMOGLOBIN A1C WITH EAG [66777 CPT(R)] HEPATITIS C AB [82674 CPT(R)] LIPID PANEL [92904 CPT(R)] METABOLIC PANEL, COMPREHENSIVE [07383 CPT(R)] REFERRAL TO SLEEP STUDIES [REF99 Custom] Comments: Hx of sleep apnea moved to area and needs follow up TSH 3RD GENERATION [38027 CPT(R)] VITAMIN B12 & FOLATE [09455 CPT(R)] VITAMIN D, 25 HYDROXY [id number] CPT(R)] To-Do List   
 02/09/2017 Imaging:  [facility] MAMMO BI SCREENING INCL CAD Referral Information Referral ID Referred By Referred To  
  
 [id number] MD Nicole Tompkins [address] Phone: [phone] Fax: [phone] Visits Status Start Date End Date 1 New Request 2/9/17 2/9/18 If your referral has a status of pending review or denied, additional information will be sent to support the outcome of this decision. Patient Instructions High Blood Pressure: Care Instructions Your Care Instructions If your blood pressure is usually above 140/90, you have high blood pressure, or hypertension. That means the top number is 140 or higher or the bottom number is 90 or higher, or both. Despite what a lot of people think, high blood pressure usually doesn't cause headaches or make you feel dizzy or lightheaded. It usually has no symptoms. But it does increase your risk for heart attack, stroke, and kidney or eye damage. The higher your blood pressure, the more your risk increases. Your doctor will give you a goal for your blood pressure. Your goal will be based on your health and your age. An example of a goal is to keep your blood pressure below 140/90. Lifestyle changes, such as eating healthy and being active, are always important to help lower blood pressure. You might also take medicine to reach your blood pressure goal. 
Follow-up care is a key part of your treatment and safety. Be sure to make and go to all appointments, and call your doctor if you are having problems. It's also a good idea to know your test results and keep a list of the medicines you take. How can you care for yourself at home? Medical treatment · If you stop taking your medicine, your blood pressure will go back up. You may take one or more types of medicine to lower your blood pressure. Be safe with medicines. Take your medicine exactly as prescribed. Call your doctor if you think you are having a problem with your medicine. · Talk to your doctor before you start taking aspirin every day.  Aspirin can help certain people lower their risk of a heart attack or stroke. But taking aspirin isn't right for everyone, because it can cause serious bleeding. · See your doctor regularly. You may need to see the doctor more often at first or until your blood pressure comes down. · If you are taking blood pressure medicine, talk to your doctor before you take decongestants or anti-inflammatory medicine, such as ibuprofen. Some of these medicines can raise blood pressure. · Learn how to check your blood pressure at home. Lifestyle changes · Stay at a healthy weight. This is especially important if you put on weight around the waist. Losing even 10 pounds can help you lower your blood pressure. · If your doctor recommends it, get more exercise. Walking is a good choice. Bit by bit, increase the amount you walk every day. Try for at least 30 minutes on most days of the week. You also may want to swim, bike, or do other activities. · Avoid or limit alcohol. Talk to your doctor about whether you can drink any alcohol. · Try to limit how much sodium you eat to less than 2,300 milligrams (mg) a day. Your doctor may ask you to try to eat less than 1,500 mg a day. · Eat plenty of fruits (such as bananas and oranges), vegetables, legumes, whole grains, and low-fat dairy products. · Lower the amount of saturated fat in your diet. Saturated fat is found in animal products such as milk, cheese, and meat. Limiting these foods may help you lose weight and also lower your risk for heart disease. · Do not smoke. Smoking increases your risk for heart attack and stroke. If you need help quitting, talk to your doctor about stop-smoking programs and medicines. These can increase your chances of quitting for good. When should you call for help? Call 911 anytime you think you may need emergency care.  This may mean having symptoms that suggest that your blood pressure is causing a serious heart or blood vessel problem. Your blood pressure may be over 180/110. For example, call 911 if: 
· You have symptoms of a heart attack. These may include: ¨ Chest pain or pressure, or a strange feeling in the chest. 
¨ Sweating. ¨ Shortness of breath. ¨ Nausea or vomiting. ¨ Pain, pressure, or a strange feeling in the back, neck, jaw, or upper belly or in one or both shoulders or arms. ¨ Lightheadedness or sudden weakness. ¨ A fast or irregular heartbeat. · You have symptoms of a stroke. These may include: 
¨ Sudden numbness, tingling, weakness, or loss of movement in your face, arm, or leg, especially on only one side of your body. ¨ Sudden vision changes. ¨ Sudden trouble speaking. ¨ Sudden confusion or trouble understanding simple statements. ¨ Sudden problems with walking or balance. ¨ A sudden, severe headache that is different from past headaches. · You have severe back or belly pain. Do not wait until your blood pressure comes down on its own. Get help right away. Call your doctor now or seek immediate care if: 
· Your blood pressure is much higher than normal (such as 180/110 or higher), but you don't have symptoms. · You think high blood pressure is causing symptoms, such as: ¨ Severe headache. ¨ Blurry vision. Watch closely for changes in your health, and be sure to contact your doctor if: 
· Your blood pressure measures 140/90 or higher at least 2 times. That means the top number is 140 or higher or the bottom number is 90 or higher, or both. · You think you may be having side effects from your blood pressure medicine. · Your blood pressure is usually normal, but it goes above normal at least 2 times. Where can you learn more? Go to http://fredis-yasmani.info/. Enter P693 in the search box to learn more about \"High Blood Pressure: Care Instructions. \" Current as of: August 8, 2016 Content Version: 11.1 © 7846-8837 Healthwise, Incorporated. Care instructions adapted under license by [employer] (which disclaims liability or warranty for this information). If you have questions about a medical condition or this instruction, always ask your healthcare professional. [address] any warranty or liability for your use of this information. Please provide this summary of care documentation to your next provider. Your primary care clinician is listed as Alexus Castillo. If you have any questions after today's visit, please call [phone]. 19.59

## 2024-12-24 NOTE — DISCHARGE NOTE PROVIDER - DISCHARGE SERVICE FOR PATIENT
PROCEDURES:  Intramedullary nailing of left femur 29-Feb-2024 17:53:41  More Carbajal   on the discharge service for the patient. I have reviewed and made amendments to the documentation where necessary.

## 2024-12-24 NOTE — DIETITIAN INITIAL EVALUATION ADULT - PERTINENT LABORATORY DATA
12-23    138  |  105  |  10  ----------------------------<  92  3.5   |  21[L]  |  0.70    Ca    8.0[L]      23 Dec 2024 06:20  Phos  2.3     12-23  Mg     2.1     12-23    TPro  6.5  /  Alb  2.8[L]  /  TBili  0.4  /  DBili  x   /  AST  37  /  ALT  28  /  AlkPhos  77  12-23

## 2024-12-24 NOTE — DISCHARGE NOTE PROVIDER - NSDCMRMEDTOKEN_GEN_ALL_CORE_FT
aspirin 81 mg oral tablet: 1 tab(s) orally once a day  atorvastatin 20 mg oral tablet: 1 tab(s) orally once a day (at bedtime)  carvedilol 12.5 mg oral tablet: 1 tab(s) orally 2 times a day  cholecalciferol 125 mcg (5000 intl units) oral tablet: 1 tab(s) orally once a week  enalapril 20 mg oral tablet: 1 tab(s) orally once a day  furosemide 20 mg oral tablet: 1 tab(s) orally once a day  levothyroxine 112 mcg (0.112 mg) oral tablet: 1 tab(s) orally once a day  memantine 10 mg oral tablet: 1 tab(s) orally 2 times a day   acetaminophen 325 mg oral tablet: 2 tab(s) orally every 6 hours As needed Temp greater or equal to 38C (100.4F), Mild Pain (1 - 3)  ascorbic acid 500 mg oral tablet: 1 tab(s) orally once a day  aspirin 81 mg oral tablet: 1 tab(s) orally once a day  atorvastatin 20 mg oral tablet: 1 tab(s) orally once a day (at bedtime)  carvedilol 12.5 mg oral tablet: 1 tab(s) orally 2 times a day  cholecalciferol 125 mcg (5000 intl units) oral tablet: 1 tab(s) orally once a week  enalapril 20 mg oral tablet: 1 tab(s) orally once a day  levothyroxine 112 mcg (0.112 mg) oral tablet: 1 tab(s) orally once a day  memantine 10 mg oral tablet: 1 tab(s) orally 2 times a day  Multiple Vitamins oral tablet: 1 tab(s) orally once a day   acetaminophen 325 mg oral tablet: 2 tab(s) orally every 6 hours As needed Temp greater or equal to 38C (100.4F), Mild Pain (1 - 3)  ascorbic acid 500 mg oral tablet: 1 tab(s) orally once a day  atorvastatin 20 mg oral tablet: 1 tab(s) orally once a day (at bedtime)  carvedilol 12.5 mg oral tablet: 1 tab(s) orally 2 times a day  cholecalciferol 125 mcg (5000 intl units) oral tablet: 1 tab(s) orally once a week  Dakin Solution 1/4 Strength: Right Trochanter Wound: Cleanse/irrigate with 1/4 strength dakin solution with dakin moisten gauze then DSD secure with tegraderm twice a day. MDD: 0.125  enalapril 20 mg oral tablet: 1 tab(s) orally once a day  levothyroxine 112 mcg (0.112 mg) oral tablet: 1 tab(s) orally once a day  memantine 10 mg oral tablet: 1 tab(s) orally 2 times a day  Multiple Vitamins oral tablet: 1 tab(s) orally once a day  Zosyn 3 g-0.375 g/50 mL intravenous solution: 3.375 gram(s) intravenously every 8 hours Infuse over 4 hours MDD: 3.375

## 2024-12-24 NOTE — DISCHARGE NOTE NURSING/CASE MANAGEMENT/SOCIAL WORK - PATIENT PORTAL LINK FT
You can access the FollowMyHealth Patient Portal offered by [facility] by registering at the following website: [email or web address]. By joining [facility]’s FollowMyHealth portal, you will also be able to view your health information using other applications (apps) compatible with our system.

## 2024-12-24 NOTE — CONSULT NOTE ADULT - ASSESSMENT
89 year old male with advanced Alzheimer's dementia, hx of thyroid cancer s/p thyroidectomy, prostate cancer s/p prostatectomy, HFrEF, HTN, HLD  presents from home with nausea, vomiting and poor PO intake x 1 day admitted for acute cholecystitis awaiting perc ulises drain. (Taken from internal medicine note). Palliative consulted for assistance with GOC.

## 2024-12-24 NOTE — PROGRESS NOTE ADULT - PROBLEM SELECTOR PLAN 6
- Patient diagnosed with PE at [facility] recently in past several weeks prior and was on a heparin drip and Eliquis on discharge. Hospital course complicated thigh hematoma  - Hold AC for now pending procedure, daughter not too keen on heparin infusion in the interim  - He was discharged on Eliquis 2.5 mg BID at a reduced dose likely due to bleed. Will resume this after drain placement.  - Our CTA chest w/ no PE. Pending LE Duplex

## 2024-12-24 NOTE — CONSULT NOTE ADULT - PROBLEM SELECTOR RECOMMENDATION 6
will follow for goc   case discussed with primary team  Can be reached by TEAMS M-F 9-5 Nicky Morris Any other time please page [phone] if needed will sign off as goals established   case discussed with primary team  Can be reached by TEAMS M-F 9-5 Nicky Morris Any other time please page [phone] if needed

## 2024-12-24 NOTE — DIETITIAN INITIAL EVALUATION ADULT - NSFNSPHYEXAMSKINFT_GEN_A_CORE
sacrum bilateral buttocks  stage 4 right hip  left hip suspected DTI  right heel suspected DTI  left heel suspected DTI

## 2024-12-24 NOTE — CONSULT NOTE ADULT - PROBLEM SELECTOR RECOMMENDATION 9
CT abdomen- Abdomen/pelvis: No bowel obstruction. Gallbladder findings suspicious for acute cholecystitis.  perc ulises on hold due to need for further GOC CT abdomen- Abdomen/pelvis: No bowel obstruction. Gallbladder findings suspicious for acute cholecystitis.  perc ulises on hold due to potential transfer to [place] and discussion with anesthesia

## 2024-12-24 NOTE — DISCHARGE NOTE PROVIDER - NSDCCPCAREPLAN_GEN_ALL_CORE_FT
PRINCIPAL DISCHARGE DIAGNOSIS  Diagnosis: Acute cholecystitis  Assessment and Plan of Treatment: Not a surgical candidate.   IV Zosyn started.   Pt to be transferred to [facility] for further management.      SECONDARY DISCHARGE DIAGNOSES  Diagnosis: Alzheimer dementia  Assessment and Plan of Treatment: Continue present medication regimen.

## 2024-12-24 NOTE — DISCHARGE NOTE PROVIDER - HOSPITAL COURSE
HPI:  Information obtained from chart review as patient with advanced dementia and I have not been able to reach daughter Larisa    Patient is a 89 year old male with advanced Alzheimer's dementia, hx of thyroid cancer s/p thyroidectomy, prostate cancer s/p prostatectomy, HFrEF, HTN, HLD  presents from home with  nausea, vomiting and poor PO intake x 1 day. 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 Currently patient is AAOX0, I am not able to obtain any history and this appears to be his baseline mentation.    (20 Dec 2024 11:14)    Hospital Course:  #Acute cholecystitis.   ·  Plan: - CT abdomen- Abdomen/pelvis: No bowel obstruction. Gallbladder findings suspicious for acute cholecystitis.  - per surgery, not a surgical candidate at this time d/t poor optimization for surgery and not within goals of care per daughter  - per IR, plan for perc ulises on hold as patient still confirming if she wants to transfer the patient to [place]  - I have discussed with [place] transfer center/ dr osmany leyva- patient can be accepted however would not be able to place IR drain until next week. Patients daughter not sure if she wants to wait that long for the drain replacement.   - HIDA scan confirmed   - c/w zosyn, started 12/20  - blood cultures x 2 12/20, possible contaminant; repeat ngtd   - ID following.    #Alzheimer dementia.   ·  Plan: - baseline appears to be aaox0-1  - c/w memantine daily   - palliative care consulted-- daughter confirmed full code   - Pureed with thickened diet  - Wound care following for decubitus ulcer present on admission. Also has severe protein-calorie malnutrition.    #Acute systolic congestive heart failure.   ·  Plan: - appears euvolemic, on room air  - CXR with small b/l pleural effusions  - pro bnp >20,000  - Cards following and they want to gently diurese him, hold off until tolerating more PO.    # Hypertension.   ·  Plan: - bp stable   - c/w enalapril and coreg.    #Pulmonary thromboembolism.   ·  Plan: - Patient diagnosed with PE at [place] recently in past several weeks prior and was on a heparin drip and Eliquis on discharge. Hospital course complicated thigh hematoma  - Hold AC for now pending procedure, daughter not too keen on heparin infusion in the interim  - He was discharged on Eliquis 2.5 mg BID at a reduced dose likely due to bleed. Will resume this after drain placement.  - CTA chest w/ no PE @ [facility].  Unable to do complete Duplex of LE 2/2 extremity contracture.     Important Medication Changes and Reason:    Active or Pending Issues Requiring Follow-up:    Advanced Directives:   [ ] Full code  [ ] DNR  [ ] Hospice    Discharge Diagnoses:         HPI:  Information obtained from chart review as patient with advanced dementia and I have not been able to reach daughter Larisa    Patient is a 89 year old male with advanced Alzheimer's dementia, hx of thyroid cancer s/p thyroidectomy, prostate cancer s/p prostatectomy, HFrEF, HTN, HLD  presents from home with  nausea, vomiting and poor PO intake x 1 day. 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 Currently patient is AAOX0, I am not able to obtain any history and this appears to be his baseline mentation.    (20 Dec 2024 11:14)    Hospital Course:  #Acute cholecystitis.   ·  Plan: - CT abdomen- Abdomen/pelvis: No bowel obstruction. Gallbladder findings suspicious for acute cholecystitis.  - per surgery, not a surgical candidate at this time d/t poor optimization for surgery and not within goals of care per daughter  - per IR, plan for perc ulises on hold as patient still confirming if she wants to transfer the patient to [place]  - I have discussed with [place] transfer center/ dr osmany leyva- patient can be accepted however would not be able to place IR drain until next week. Patients daughter not sure if she wants to wait that long for the drain replacement.   - HIDA scan confirmed   - c/w zosyn, started 12/20  - blood cultures x 2 12/20, possible contaminant; repeat ngtd   - ID following.    #Alzheimer dementia.   ·  Plan: - baseline appears to be aaox0-1  - c/w memantine daily   - palliative care consulted-- daughter confirmed full code   - Pureed with thickened diet  - Wound care following for decubitus ulcer present on admission. Also has severe protein-calorie malnutrition.    #Acute systolic congestive heart failure.   ·  Plan: - appears euvolemic, on room air  - CXR with small b/l pleural effusions  - pro bnp >20,000  - Cards following and they want to gently diurese him, hold off until tolerating more PO.    # Hypertension.   ·  Plan: - bp stable   - c/w enalapril and coreg.    #Pulmonary thromboembolism.   ·  Plan: - Patient diagnosed with PE at [place] recently in past several weeks prior and was on a heparin drip and Eliquis on discharge. Hospital course complicated thigh hematoma  - Hold AC for now pending procedure, daughter not too keen on heparin infusion in the interim  - He was discharged on Eliquis 2.5 mg BID at a reduced dose likely due to bleed. Will resume this after drain placement.  - CTA chest w/ no PE @ [facility].  Unable to do complete Duplex of LE 2/2 extremity contracture.     Important Medication Changes and Reason:    Active or Pending Issues Requiring Follow-up:    Advanced Directives:   [ x] Full code  [ ] DNR  [ ] Hospice    Discharge Diagnoses:  Acute Cholecystitis  Dementia  Acute Systolic Heart Failure  HTN     HPI:  Information obtained from chart review as patient with advanced dementia and I have not been able to reach daughter Larisa    Patient is a 89 year old male with advanced Alzheimer's dementia, hx of thyroid cancer s/p thyroidectomy, prostate cancer s/p prostatectomy, HFrEF, HTN, HLD  presents from home with  nausea, vomiting and poor PO intake x 1 day. 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 Currently patient is AAOX0, I am not able to obtain any history and this appears to be his baseline mentation.    (20 Dec 2024 11:14)    Hospital Course:  #Acute cholecystitis.   ·  Plan: - CT abdomen- Abdomen/pelvis: No bowel obstruction. Gallbladder findings suspicious for acute cholecystitis.  - per surgery, not a surgical candidate at this time d/t poor optimization for surgery and not within goals of care per daughter  - per IR, plan for perc ulises on hold as patient still confirming if she wants to transfer the patient to [place]  - I have discussed with [place] transfer center/ dr osmany leyva- patient can be accepted however would not be able to place IR drain until next week. Patients daughter not sure if she wants to wait that long for the drain replacement.   - HIDA scan confirmed   - c/w zosyn, started 12/20  - blood cultures x 2 12/20, possible contaminant; repeat ngtd   - ID following.    #Alzheimer dementia.   ·  Plan: - baseline appears to be aaox0-1  - c/w memantine daily   - palliative care consulted-- daughter confirmed full code   - Pureed with thickened diet  - Wound care following for decubitus ulcer present on admission. Also has severe protein-calorie malnutrition.    #Acute systolic congestive heart failure.   ·  Plan: - appears euvolemic, on room air  - CXR with small b/l pleural effusions  - pro bnp >20,000  - Cards following and they want to gently diurese him, hold off until tolerating more PO.    # Hypertension.   ·  Plan: - bp stable   - c/w enalapril and coreg.    #Pulmonary thromboembolism.   ·  Plan: - Patient diagnosed with PE at [place] recently in past several weeks prior and was on a heparin drip and Eliquis on discharge. Hospital course complicated thigh hematoma  - Hold AC for now pending procedure, daughter not too keen on heparin infusion in the interim  - He was discharged on Eliquis 2.5 mg BID at a reduced dose likely due to bleed. Will resume this after drain placement.  - CTA chest w/ no PE @ [facility].  Unable to do complete Duplex of LE 2/2 extremity contracture.     Important Medication Changes and Reason:    Active or Pending Issues Requiring Follow-up:  Pt to be transferred to [facility] for continuity of care.  Family request.     Advanced Directives:   [ x] Full code  [ ] DNR  [ ] Hospice    Discharge Diagnoses:  Acute Cholecystitis  Dementia  Acute Systolic Heart Failure  HTN

## 2024-12-24 NOTE — DIETITIAN INITIAL EVALUATION ADULT - PERTINENT MEDS FT
MEDICATIONS  (STANDING):  atorvastatin 20 milliGRAM(s) Oral at bedtime  carvedilol 12.5 milliGRAM(s) Oral every 12 hours  Dakins Solution - 1/4 Strength 1 Application(s) Topical two times a day  enalapril 20 milliGRAM(s) Oral daily  influenza  Vaccine (HIGH DOSE) 0.5 milliLiter(s) IntraMuscular once  levothyroxine 112 MICROGram(s) Oral daily  memantine 10 milliGRAM(s) Oral two times a day  piperacillin/tazobactam IVPB.. 3.375 Gram(s) IV Intermittent every 8 hours    MEDICATIONS  (PRN):  acetaminophen     Tablet .. 650 milliGRAM(s) Oral every 6 hours PRN Temp greater or equal to 38C (100.4F), Mild Pain (1 - 3)

## 2024-12-24 NOTE — CONSULT NOTE ADULT - PROBLEM SELECTOR RECOMMENDATION 2
pt A&Ox1, appears to be at current baseline  requires assistance with all adl  Monitor for constipation, urinary retention, pain, hunger, thirst, etc.  Promote sleep wake cycle and reorientation as indicated.

## 2024-12-25 VITALS
DIASTOLIC BLOOD PRESSURE: 79 MMHG | SYSTOLIC BLOOD PRESSURE: 150 MMHG | TEMPERATURE: 98 F | OXYGEN SATURATION: 99 % | HEART RATE: 69 BPM | RESPIRATION RATE: 18 BRPM

## 2024-12-25 LAB
CULTURE RESULTS: SIGNIFICANT CHANGE UP
SPECIMEN SOURCE: SIGNIFICANT CHANGE UP

## 2024-12-25 PROCEDURE — 85018 HEMOGLOBIN: CPT

## 2024-12-25 PROCEDURE — 87150 DNA/RNA AMPLIFIED PROBE: CPT

## 2024-12-25 PROCEDURE — 83690 ASSAY OF LIPASE: CPT

## 2024-12-25 PROCEDURE — 83735 ASSAY OF MAGNESIUM: CPT

## 2024-12-25 PROCEDURE — 82947 ASSAY GLUCOSE BLOOD QUANT: CPT

## 2024-12-25 PROCEDURE — 84484 ASSAY OF TROPONIN QUANT: CPT

## 2024-12-25 PROCEDURE — 92610 EVALUATE SWALLOWING FUNCTION: CPT

## 2024-12-25 PROCEDURE — 86900 BLOOD TYPING SEROLOGIC ABO: CPT

## 2024-12-25 PROCEDURE — 74177 CT ABD & PELVIS W/CONTRAST: CPT | Mod: MC

## 2024-12-25 PROCEDURE — 86850 RBC ANTIBODY SCREEN: CPT

## 2024-12-25 PROCEDURE — 85730 THROMBOPLASTIN TIME PARTIAL: CPT

## 2024-12-25 PROCEDURE — 78227 HEPATOBIL SYST IMAGE W/DRUG: CPT

## 2024-12-25 PROCEDURE — 96375 TX/PRO/DX INJ NEW DRUG ADDON: CPT

## 2024-12-25 PROCEDURE — 82803 BLOOD GASES ANY COMBINATION: CPT

## 2024-12-25 PROCEDURE — 82330 ASSAY OF CALCIUM: CPT

## 2024-12-25 PROCEDURE — 84100 ASSAY OF PHOSPHORUS: CPT

## 2024-12-25 PROCEDURE — 85025 COMPLETE CBC W/AUTO DIFF WBC: CPT

## 2024-12-25 PROCEDURE — 82435 ASSAY OF BLOOD CHLORIDE: CPT

## 2024-12-25 PROCEDURE — 96374 THER/PROPH/DIAG INJ IV PUSH: CPT

## 2024-12-25 PROCEDURE — 80053 COMPREHEN METABOLIC PANEL: CPT

## 2024-12-25 PROCEDURE — 71045 X-RAY EXAM CHEST 1 VIEW: CPT

## 2024-12-25 PROCEDURE — 85610 PROTHROMBIN TIME: CPT

## 2024-12-25 PROCEDURE — 76705 ECHO EXAM OF ABDOMEN: CPT

## 2024-12-25 PROCEDURE — 84132 ASSAY OF SERUM POTASSIUM: CPT

## 2024-12-25 PROCEDURE — 70450 CT HEAD/BRAIN W/O DYE: CPT | Mod: MC

## 2024-12-25 PROCEDURE — 81001 URINALYSIS AUTO W/SCOPE: CPT

## 2024-12-25 PROCEDURE — 87040 BLOOD CULTURE FOR BACTERIA: CPT

## 2024-12-25 PROCEDURE — 80048 BASIC METABOLIC PNL TOTAL CA: CPT

## 2024-12-25 PROCEDURE — 85014 HEMATOCRIT: CPT

## 2024-12-25 PROCEDURE — 86901 BLOOD TYPING SEROLOGIC RH(D): CPT

## 2024-12-25 PROCEDURE — 83880 ASSAY OF NATRIURETIC PEPTIDE: CPT

## 2024-12-25 PROCEDURE — 71275 CT ANGIOGRAPHY CHEST: CPT | Mod: MC

## 2024-12-25 PROCEDURE — 84295 ASSAY OF SERUM SODIUM: CPT

## 2024-12-25 PROCEDURE — 99232 SBSQ HOSP IP/OBS MODERATE 35: CPT

## 2024-12-25 PROCEDURE — 85027 COMPLETE CBC AUTOMATED: CPT

## 2024-12-25 PROCEDURE — 99285 EMERGENCY DEPT VISIT HI MDM: CPT | Mod: 25

## 2024-12-25 PROCEDURE — 99239 HOSP IP/OBS DSCHRG MGMT >30: CPT

## 2024-12-25 PROCEDURE — 87077 CULTURE AEROBIC IDENTIFY: CPT

## 2024-12-25 PROCEDURE — 87637 SARSCOV2&INF A&B&RSV AMP PRB: CPT

## 2024-12-25 PROCEDURE — 93005 ELECTROCARDIOGRAM TRACING: CPT

## 2024-12-25 PROCEDURE — A9537: CPT

## 2024-12-25 PROCEDURE — 83605 ASSAY OF LACTIC ACID: CPT

## 2024-12-25 PROCEDURE — 36415 COLL VENOUS BLD VENIPUNCTURE: CPT

## 2024-12-25 RX ORDER — SODIUM HYPOCHLORITE 0.057 %
0 SOLUTION, IRRIGATION IRRIGATION
Qty: 0 | Refills: 0 | DISCHARGE
Start: 2024-12-25

## 2024-12-25 RX ORDER — PIPERACILLIN AND TAZOBACTAM 3; .375 G/15ML; G/15ML
3.38 INJECTION, POWDER, LYOPHILIZED, FOR SOLUTION INTRAVENOUS
Qty: 10 | Refills: 0
Start: 2024-12-25 | End: 2024-12-31

## 2024-12-25 RX ADMIN — Medication 1 APPLICATION(S): at 18:01

## 2024-12-25 RX ADMIN — Medication 1 APPLICATION(S): at 06:10

## 2024-12-25 RX ADMIN — MEMANTINE HYDROCHLORIDE 10 MILLIGRAM(S): 14 CAPSULE, EXTENDED RELEASE ORAL at 18:01

## 2024-12-25 RX ADMIN — CARVEDILOL 12.5 MILLIGRAM(S): 25 TABLET, FILM COATED ORAL at 06:09

## 2024-12-25 RX ADMIN — MEMANTINE HYDROCHLORIDE 10 MILLIGRAM(S): 14 CAPSULE, EXTENDED RELEASE ORAL at 06:10

## 2024-12-25 RX ADMIN — CARVEDILOL 12.5 MILLIGRAM(S): 25 TABLET, FILM COATED ORAL at 18:02

## 2024-12-25 RX ADMIN — ENALAPRIL MALEATE 20 MILLIGRAM(S): 10 TABLET ORAL at 06:10

## 2024-12-25 RX ADMIN — PIPERACILLIN AND TAZOBACTAM 25 GRAM(S): 3; .375 INJECTION, POWDER, LYOPHILIZED, FOR SOLUTION INTRAVENOUS at 14:16

## 2024-12-25 RX ADMIN — Medication 500 MILLIGRAM(S): at 13:35

## 2024-12-25 RX ADMIN — LEVOTHYROXINE SODIUM 112 MICROGRAM(S): 175 TABLET ORAL at 06:10

## 2024-12-25 RX ADMIN — PIPERACILLIN AND TAZOBACTAM 25 GRAM(S): 3; .375 INJECTION, POWDER, LYOPHILIZED, FOR SOLUTION INTRAVENOUS at 06:09

## 2024-12-25 NOTE — SWALLOW BEDSIDE ASSESSMENT ADULT - SWALLOW EVAL: RECOMMENDED DIET
1) Easy to chew with thin liquids 2) Full assist for all meals 2/2 cognitive deficits 3) Check oral cavity during and after meals

## 2024-12-25 NOTE — PROGRESS NOTE ADULT - SUBJECTIVE AND OBJECTIVE BOX
Cardiology Attending Follow-up Note     Patient seen and examined at bedside.    Overnight Events:     no events   pending perc ulises w/ IR     REVIEW OF SYSTEMS:  Constitutional:     [x ] negative [ ] fevers [ ] chills [ ] weight loss [ ] weight gain  HEENT:                  [x ] negative [ ] dry eyes [ ] eye irritation [ ] postnasal drip [ ] nasal congestion  CV:                         [ x] negative  [ ] chest pain [ ] orthopnea [ ] palpitations [ ] murmur  Resp:                     [ x] negative [ ] cough [ ] shortness of breath [ ] dyspnea [ ] wheezing [ ] sputum [ ]hemoptysis  GI:                          [ x] negative [ ] nausea [ ] vomiting [ ] diarrhea [ ] constipation [ ] abd pain [ ] dysphagia   :                        [ x] negative [ ] dysuria [ ] nocturia [ ] hematuria [ ] increased urinary frequency  Musculoskeletal: [ x] negative [ ] back pain [ ] myalgias [ ] arthralgias [ ] fracture  Skin:                       [ x] negative [ ] rash [ ] itch  Neurological:        [x ] negative [ ] headache [ ] dizziness [ ] syncope [ ] weakness [ ] numbness  Psychiatric:           [ x] negative [ ] anxiety [ ] depression  Endocrine:            [ x] negative [ ] diabetes [ ] thyroid problem  Heme/Lymph:      [ x] negative [ ] anemia [ ] bleeding problem  Allergic/Immune: [ x] negative [ ] itchy eyes [ ] nasal discharge [ ] hives [ ] angioedema    [ x] All other systems negative  [ ] Unable to assess ROS due to    Current Meds:  acetaminophen     Tablet .. 650 milliGRAM(s) Oral every 6 hours PRN  atorvastatin 20 milliGRAM(s) Oral at bedtime  carvedilol 12.5 milliGRAM(s) Oral every 12 hours  Dakins Solution - 1/4 Strength 1 Application(s) Topical two times a day  enalapril 20 milliGRAM(s) Oral daily  levothyroxine 112 MICROGram(s) Oral daily  memantine 10 milliGRAM(s) Oral two times a day  piperacillin/tazobactam IVPB.. 3.375 Gram(s) IV Intermittent every 8 hours      PAST MEDICAL & SURGICAL HISTORY:  Dementia      HTN (hypertension)      HLD (hyperlipidemia)      Thyroid cancer      Prostate cancer      S/P thyroidectomy  Total in 1980      S/P prostatectomy  2004          Vitals:  T(F): 97.8 (12-22), Max: 97.9 (12-21)  HR: 78 (12-22) (68 - 78)  BP: 141/67 (12-22) (139/69 - 151/73)  RR: 18 (12-22)  SpO2: 100% (12-22)  I&O's Summary    21 Dec 2024 07:01  -  22 Dec 2024 07:00  --------------------------------------------------------  IN: 100 mL / OUT: 300 mL / NET: -200 mL        Physical Exam:  Appearance: No acute distress  HENT: No JVD   Cardiovascular: RRR, S1/S2, no murmurs  Respiratory: CTABL  Gastrointestinal: +RUQ pain   Musculoskeletal: No clubbing, no edema   Skin: No rashes, ecchymoses, or cyanosis                          10.5   5.53  )-----------( 300      ( 22 Dec 2024 07:13 )             33.8     12-22    137  |  103  |  10  ----------------------------<  94  3.5   |  22  |  0.74    Ca    8.3[L]      22 Dec 2024 07:15  Phos  2.9     12-22  Mg     2.1     12-22    TPro  6.6  /  Alb  2.8[L]  /  TBili  0.6  /  DBili  x   /  AST  43[H]  /  ALT  29  /  AlkPhos  86  12-22    PT/INR - ( 22 Dec 2024 07:14 )   PT: 14.7 sec;   INR: 1.29 ratio         PTT - ( 22 Dec 2024 07:14 )  PTT:28.8 sec              Cardiovascular Testings:     
Cardiology Attending Follow-up Note     Patient seen and examined at bedside.    Overnight Events:     no events, not able to endorse any complains     REVIEW OF SYSTEMS:    [x ] Unable to assess ROS due to mental status     Current Meds:  acetaminophen     Tablet .. 650 milliGRAM(s) Oral every 6 hours PRN  atorvastatin 20 milliGRAM(s) Oral at bedtime  carvedilol 12.5 milliGRAM(s) Oral every 12 hours  Dakins Solution - 1/4 Strength 1 Application(s) Topical two times a day  dextrose 5% + sodium chloride 0.9%. 1000 milliLiter(s) IV Continuous <Continuous>  enalapril 20 milliGRAM(s) Oral daily  levothyroxine 112 MICROGram(s) Oral daily  memantine 10 milliGRAM(s) Oral two times a day  piperacillin/tazobactam IVPB.. 3.375 Gram(s) IV Intermittent every 8 hours      PAST MEDICAL & SURGICAL HISTORY:  Dementia      HTN (hypertension)      HLD (hyperlipidemia)      Thyroid cancer      Prostate cancer      S/P thyroidectomy  Total in 1980      S/P prostatectomy  2004          Vitals:  T(F): 97.9 (12-21), Max: 98 (12-21)  HR: 68 (12-21) (68 - 90)  BP: 139/69 (12-21) (114/67 - 141/74)  RR: 18 (12-21)  SpO2: 100% (12-21)  I&O's Summary    20 Dec 2024 07:01  -  21 Dec 2024 07:00  --------------------------------------------------------  IN: 150 mL / OUT: 150 mL / NET: 0 mL    21 Dec 2024 07:01  -  21 Dec 2024 23:21  --------------------------------------------------------  IN: 0 mL / OUT: 300 mL / NET: -300 mL        Physical Exam:  Appearance: No acute distress  HENT: No JVD   Cardiovascular: RRR, S1/S2, no murmurs  Respiratory: CTABL  Gastrointestinal: soft, NT ND, +BS  Musculoskeletal: No clubbing, no edema   Neurologic: AO x 0  Skin: No rashes, ecchymoses, or cyanosis                          10.2   7.09  )-----------( 294      ( 21 Dec 2024 07:30 )             33.5     12-21    135  |  101  |  14  ----------------------------<  111[H]  3.7   |  21[L]  |  0.79    Ca    8.4      21 Dec 2024 07:28    TPro  6.5  /  Alb  3.9  /  TBili  0.8  /  DBili  x   /  AST  23  /  ALT  18  /  AlkPhos  79  12-20    PT/INR - ( 21 Dec 2024 15:44 )   PT: 14.6 sec;   INR: 1.27 ratio         PTT - ( 21 Dec 2024 15:44 )  PTT:28.2 sec              Cardiovascular Testings:   EKG: Personally reviewed by me - EUGENE HOBSON  Radiology: Personally reviewed by me - ROSY chest CTPA: Suboptimal study for evaluation of the left lower lobe pulmonary vasculature. Otherwise, no pulmonary embolism. Small bilateral pleural effusions, left greater than right.       TTE CONCLUSIONS:      1. Left ventricular cavity is normal in size. Left ventricular wall thickness is normal. Left ventricular systolic function is severely decreased with an ejection fraction visually estimated at 30 %. Global left ventricular hypokinesis.   2. Elevated left ventricular filling pressure.   3. Moderately enlarged right ventricular cavity size and moderately reduced right ventricular systolic function.   4. Moderate to severe mitral regurgitation.   5. Mild to moderate pulmonary hypertension.  
DATE OF SERVICE: 12-25-24 @ 17:28    Patient is a 89y old  Male who presents with a chief complaint of nausea, vomiting (25 Dec 2024 14:05)      INTERVAL HISTORY: feels ok    TELEMETRY Personally reviewed: no events  	  MEDICATIONS:  carvedilol 12.5 milliGRAM(s) Oral every 12 hours  enalapril 20 milliGRAM(s) Oral daily        PHYSICAL EXAM:  T(C): 36.7 (12-25-24 @ 15:59), Max: 37.2 (12-25-24 @ 11:52)  HR: 78 (12-25-24 @ 15:59) (72 - 89)  BP: 150/78 (12-25-24 @ 15:59) (150/78 - 178/81)  RR: 18 (12-25-24 @ 15:59) (18 - 18)  SpO2: 99% (12-25-24 @ 15:59) (98% - 100%)  Wt(kg): --  I&O's Summary    24 Dec 2024 07:01  -  25 Dec 2024 07:00  --------------------------------------------------------  IN: 575 mL / OUT: 500 mL / NET: 75 mL    25 Dec 2024 07:01  -  25 Dec 2024 17:28  --------------------------------------------------------  IN: 480 mL / OUT: 400 mL / NET: 80 mL          Appearance: In no distress	  HEENT:    PERRL, EOMI	  Cardiovascular:  S1 S2, No JVD  Respiratory: Lungs clear to auscultation	  Gastrointestinal:  Soft, Non-tender, + BS	  Vascularature:  No edema of LE  Psychiatric: Appropriate affect   Neuro: no acute focal deficits           Labs personally reviewed      ASSESSMENT/PLAN: 	    89 year old male with advanced Alzheimer's dementia, hx of thyroid cancer s/p thyroidectomy, prostate cancer s/p prostatectomy, HFrEF, HTN, HLD  presents from home with  nausea, vomiting and poor PO intake x 1 day admitted for acute cholecystitis awaiting perc ulises drain.       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   ·  Plan:   CXR with small b/l pleural effusions  - pro bnp 30k from 2800 last month a 10 fold increase  - However not hypoxic on RA and comfortable   - Add Aldactone pending resolution of RUSS  - Jardiance avoided 2/2 urine incontinence with diaper  - GDMT with Enalapril 20mg PO daily and Coreg 12.5mg BID    Problem/Plan - 2:  ·  Problem: Hypertension.   ·  Plan: - bp stable   - c/w enalapril and coreg.    Problem/Plan - 3:  ·  Problem: Other hyperlipidemia.   ·  Plan: - c/w statin.    Problem/Plan - 4:  ·  Problem: RUSS  - Resolved          Conner Valdivia DO [facility]  Cardiovascular Medicine  [address] [place] [address], [address]  Office: [phone]  Available via Text/call on [employer] Teams
  Follow Up:  cholecystitis    Interval History/ROS:  Overnight: No acute events.  Patient remains afebrile.  Otherwise hemodynamically stable on room air.  Latest labs show no leukocytosis, anemia 10/35, BMP with renal function within normal limits, hepatic function within normal limits.  Repeat blood cultures are negative to date.    Patient seen examined bedside.  No new complaints.      Allergies  No Known Allergies        ANTIMICROBIALS:  piperacillin/tazobactam IVPB.. 3.375 every 8 hours      OTHER MEDS:  MEDICATIONS  (STANDING):  acetaminophen     Tablet .. 650 every 6 hours PRN  atorvastatin 20 at bedtime  carvedilol 12.5 every 12 hours  enalapril 20 daily  influenza  Vaccine (HIGH DOSE) 0.5 once  levothyroxine 112 daily  memantine 10 two times a day      Vital Signs Last 24 Hrs  T(C): 36.7 (24 Dec 2024 08:05), Max: 36.8 (24 Dec 2024 04:57)  T(F): 98.1 (24 Dec 2024 08:05), Max: 98.2 (24 Dec 2024 04:57)  HR: 73 (24 Dec 2024 08:05) (61 - 85)  BP: 147/68 (24 Dec 2024 08:05) (147/68 - 165/87)  BP(mean): --  RR: 18 (24 Dec 2024 08:05) (17 - 18)  SpO2: 97% (24 Dec 2024 08:05) (96% - 99%)    Parameters below as of 24 Dec 2024 08:05  Patient On (Oxygen Delivery Method): room air        PHYSICAL EXAM:  GA: NAD  HEENT: uncooperative  CV: nl S1/S2, no RMG  Lungs: CTAB, No distress  Abd: BS+, soft, nontender, no rebounding pain  Ext: trace edema  Neuro: No focal deficits  Skin:  wounds noted to right hip, reportedly on sacrum and left heal stage 2  IV: no phlebitis                              10.8   5.73  )-----------( 277      ( 23 Dec 2024 06:20 )             35.0       12-23    138  |  105  |  10  ----------------------------<  92  3.5   |  21[L]  |  0.70    Ca    8.0[L]      23 Dec 2024 06:20  Phos  2.3     12-23  Mg     2.1     12-23    TPro  6.5  /  Alb  2.8[L]  /  TBili  0.4  /  DBili  x   /  AST  37  /  ALT  28  /  AlkPhos  77  12-23      Urinalysis Basic - ( 23 Dec 2024 06:20 )    Color: x / Appearance: x / SG: x / pH: x  Gluc: 92 mg/dL / Ketone: x  / Bili: x / Urobili: x   Blood: x / Protein: x / Nitrite: x   Leuk Esterase: x / RBC: x / WBC x   Sq Epi: x / Non Sq Epi: x / Bacteria: x        MICROBIOLOGY:  v    Culture - Blood (collected 22 Dec 2024 07:22)  Source: .Blood BLOOD  Preliminary Report (24 Dec 2024 10:01):    No growth at 48 Hours    Culture - Blood (collected 21 Dec 2024 15:44)  Source: .Blood BLOOD  Preliminary Report (23 Dec 2024 22:01):    No growth at 48 Hours    Culture - Blood (collected 20 Dec 2024 14:24)  Source: .Blood BLOOD  Gram Stain (21 Dec 2024 11:22):    Growth in aerobic bottle: Gram Positive Cocci in Clusters  Final Report (22 Dec 2024 11:55):    Growth in aerobic bottle: Staphylococcus warneri Isolation of Coagulase    negative Staphylococcus from single blood culture sets may represent    contamination. Contact the Microbiology Department at [phone] if    susceptibility testing is needed.    clinically indicated.    Direct identification is available within approximately 3-5    hours either by Blood Panel Multiplexed PCR or Direct    MALDI-TOF. Details: [email or web address]  Organism: Blood Culture PCR (22 Dec 2024 11:55)  Organism: Blood Culture PCR (22 Dec 2024 11:55)      Method Type: PCR      -  Coagulase negative Staphylococcus: Detec    Culture - Blood (collected 20 Dec 2024 14:24)  Source: .Blood BLOOD  Preliminary Report (23 Dec 2024 17:01):    No growth at 72 Hours                    RADIOLOGY:  Imaging reviewed
DATE OF SERVICE: 12-23-24 @ 23:38    Patient is a 89y old  Male who presents with a chief complaint of nausea, vomiting (23 Dec 2024 21:35)      INTERVAL HISTORY: feels ok    	  MEDICATIONS:  carvedilol 12.5 milliGRAM(s) Oral every 12 hours  enalapril 20 milliGRAM(s) Oral daily        PHYSICAL EXAM:  T(C): 36.6 (12-23-24 @ 20:08), Max: 36.7 (12-23-24 @ 04:38)  HR: 61 (12-23-24 @ 20:08) (60 - 65)  BP: 149/81 (12-23-24 @ 20:08) (148/74 - 151/73)  RR: 17 (12-23-24 @ 20:08) (17 - 18)  SpO2: 99% (12-23-24 @ 20:08) (94% - 99%)  Wt(kg): --  I&O's Summary    22 Dec 2024 07:01  -  23 Dec 2024 07:00  --------------------------------------------------------  IN: 320 mL / OUT: 100 mL / NET: 220 mL    23 Dec 2024 07:01  -  23 Dec 2024 23:38  --------------------------------------------------------  IN: 120 mL / OUT: 400 mL / NET: -280 mL          Appearance: In no distress	  HEENT:    PERRL, EOMI	  Cardiovascular:  S1 S2, No JVD  Respiratory: Lungs clear to auscultation	  Gastrointestinal:  Soft, Non-tender, + BS	  Vascularature:  No edema of LE  Psychiatric: Appropriate affect   Neuro: no acute focal deficits                               10.8   5.73  )-----------( 277      ( 23 Dec 2024 06:20 )             35.0     12-23    138  |  105  |  10  ----------------------------<  92  3.5   |  21[L]  |  0.70    Ca    8.0[L]      23 Dec 2024 06:20  Phos  2.3     12-23  Mg     2.1     12-23    TPro  6.5  /  Alb  2.8[L]  /  TBili  0.4  /  DBili  x   /  AST  37  /  ALT  28  /  AlkPhos  77  12-23        Labs personally reviewed      Assessment and Plan:   · Assessment	  89 year old male with advanced Alzheimer's dementia, hx of thyroid cancer s/p thyroidectomy, prostate cancer s/p prostatectomy, HFrEF, HTN, HLD  presents from home with  nausea, vomiting and poor PO intake x 1 day admitted for acute cholecystitis awaiting perc ulises drain.       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   ·  Plan:   CXR with small b/l pleural effusions  - pro bnp 30k from 2800 last month a 10 fold increase  - However not hypoxic on RA and comfortable   - Add Aldactone pending resolution of RUSS  - Jardiance avoided 2/2 urine incontinence with diaper  - GDMT with Enalapril 20mg PO daily and Coreg 12.5mg BID    Problem/Plan - 2:  ·  Problem: Hypertension.   ·  Plan: - bp stable   - c/w enalapril and coreg.    Problem/Plan - 3:  ·  Problem: Other hyperlipidemia.   ·  Plan: - c/w statin.    Problem/Plan - 4:  ·  Problem: RUSS  - Resolved              Conner Valdivia DO [facility]  Cardiovascular Medicine  [address], [address]  Office: [phone]  Available via Text/call on Microsoft Teams
unable to obtain ROS    MEDICATIONS  (STANDING):  atorvastatin 20 milliGRAM(s) Oral at bedtime  carvedilol 12.5 milliGRAM(s) Oral every 12 hours  Dakins Solution - 1/4 Strength 1 Application(s) Topical two times a day  enalapril 20 milliGRAM(s) Oral daily  influenza  Vaccine (HIGH DOSE) 0.5 milliLiter(s) IntraMuscular once  levothyroxine 112 MICROGram(s) Oral daily  memantine 10 milliGRAM(s) Oral two times a day  piperacillin/tazobactam IVPB.. 3.375 Gram(s) IV Intermittent every 8 hours    MEDICATIONS  (PRN):  acetaminophen     Tablet .. 650 milliGRAM(s) Oral every 6 hours PRN Temp greater or equal to 38C (100.4F), Mild Pain (1 - 3)    Vital Signs Last 24 Hrs  T(C): 36.6 (23 Dec 2024 11:38), Max: 36.9 (22 Dec 2024 21:16)  T(F): 97.8 (23 Dec 2024 11:38), Max: 98.5 (22 Dec 2024 21:16)  HR: 65 (23 Dec 2024 11:38) (60 - 78)  BP: 151/73 (23 Dec 2024 11:38) (141/67 - 163/65)  BP(mean): --  RR: 18 (23 Dec 2024 11:38) (17 - 18)  SpO2: 94% (23 Dec 2024 11:38) (94% - 100%)    Parameters below as of 23 Dec 2024 11:38  Patient On (Oxygen Delivery Method): room air    GENERAL: NAD  HEAD:  Atraumatic, Normocephalic  EYES: EOMI, PERRLA, conjunctiva and sclera clear  ENT: Pharynx not erythematous  PULMONARY: Clear to auscultation bilaterally; No wheeze  CARDIOVASCULAR: Regular rate and rhythm; No murmurs, rubs, or gallops  ABDOMEN: Soft, Nontender, Nondistended; Bowel sounds present  EXTREMITIES:  2+ Peripheral Pulses, No clubbing, cyanosis, or edema  MUSCULOSKELETAL: No calf tenderness  PSYCH: AAOx3, normal affect  SKIN: warm and dry, No rashes or lesions    .  LABS:                         10.8   5.73  )-----------( 277      ( 23 Dec 2024 06:20 )             35.0     12-23    138  |  105  |  10  ----------------------------<  92  3.5   |  21[L]  |  0.70    Ca    8.0[L]      23 Dec 2024 06:20  Phos  2.3     12-23  Mg     2.1     12-23    TPro  6.5  /  Alb  2.8[L]  /  TBili  0.4  /  DBili  x   /  AST  37  /  ALT  28  /  AlkPhos  77  12-23    PT/INR - ( 23 Dec 2024 06:20 )   PT: 13.6 sec;   INR: 1.20 ratio         PTT - ( 23 Dec 2024 06:20 )  PTT:27.6 sec  Urinalysis Basic - ( 23 Dec 2024 06:20 )    Color: x / Appearance: x / SG: x / pH: x  Gluc: 92 mg/dL / Ketone: x  / Bili: x / Urobili: x   Blood: x / Protein: x / Nitrite: x   Leuk Esterase: x / RBC: x / WBC x   Sq Epi: x / Non Sq Epi: x / Bacteria: x            RADIOLOGY, EKG & ADDITIONAL TESTS: Reviewed. 
  Follow Up:  cholecystitis    Interval History/ROS:  Overnight: No acute events.  Patient remains afebrile.  Otherwise hemodynamically stable on room air.  Latest labs show no leukocytosis, anemia 10.8/35, BMP with renal function within normal limits, [place] function within normal limits.  Blood cultures from 12/21/2024 and 12/22/2024 remain negative to date.    Patient seen examined at bedside.  No new complaints.  Allergies  No Known Allergies        ANTIMICROBIALS:  piperacillin/tazobactam IVPB.. 3.375 every 8 hours      OTHER MEDS:  MEDICATIONS  (STANDING):  acetaminophen     Tablet .. 650 every 6 hours PRN  atorvastatin 20 at bedtime  carvedilol 12.5 every 12 hours  enalapril 20 daily  influenza  Vaccine (HIGH DOSE) 0.5 once  levothyroxine 112 daily  memantine 10 two times a day      Vital Signs Last 24 Hrs  T(C): 37.2 (25 Dec 2024 11:52), Max: 37.2 (25 Dec 2024 11:52)  T(F): 98.9 (25 Dec 2024 11:52), Max: 98.9 (25 Dec 2024 11:52)  HR: 72 (25 Dec 2024 11:52) (72 - 89)  BP: 178/81 (25 Dec 2024 11:52) (151/72 - 178/81)  BP(mean): --  RR: 18 (25 Dec 2024 11:52) (18 - 18)  SpO2: 99% (25 Dec 2024 11:52) (98% - 100%)    Parameters below as of 25 Dec 2024 11:52  Patient On (Oxygen Delivery Method): room air        PHYSICAL EXAM:  GA: NAD  HEENT: uncooperative  CV: nl S1/S2, no RMG  Lungs: CTAB, No distress  Abd: BS+, soft, nontender, no rebounding pain  Ext: trace edema  Neuro: No focal deficits  Skin:  wounds noted to right hip, reportedly on sacrum and left heal stage 2  IV: no phlebitis                          MICROBIOLOGY:  v    Culture - Blood (collected 22 Dec 2024 07:22)  Source: .Blood BLOOD  Preliminary Report (25 Dec 2024 10:00):    No growth at 72 Hours    Culture - Blood (collected 21 Dec 2024 15:44)  Source: .Blood BLOOD  Preliminary Report (24 Dec 2024 22:01):    No growth at 72 Hours    Culture - Blood (collected 20 Dec 2024 14:24)  Source: .Blood BLOOD  Gram Stain (21 Dec 2024 11:22):    Growth in aerobic bottle: Gram Positive Cocci in Clusters  Final Report (22 Dec 2024 11:55):    Growth in aerobic bottle: Staphylococcus warneri Isolation of Coagulase    negative Staphylococcus from single blood culture sets may represent    contamination. Contact the Microbiology Department at [phone] if    susceptibility testing is needed.    clinically indicated.    Direct identification is available within approximately 3-5    hours either by Blood Panel Multiplexed PCR or Direct    MALDI-TOF. Details: [email or web address]  Organism: Blood Culture PCR (22 Dec 2024 11:55)  Organism: Blood Culture PCR (22 Dec 2024 11:55)      Method Type: PCR      -  Coagulase negative Staphylococcus: Detec    Culture - Blood (collected 20 Dec 2024 14:24)  Source: .Blood BLOOD  Preliminary Report (24 Dec 2024 17:01):    No growth at 4 days          RADIOLOGY:  imaging reviewed
  Follow Up:  cholecystitis    Interval History/ROS:  Overnight: No acute events.  Patient remains afebrile.  Otherwise hemodynamically stable on room air.  Latest labs show no leukocytosis, anemia 10.8/35, BMP with renal function within normal limits, hepatic function within normal limits.  Blood culture from 12/20/2024 with coagulase-negative Staphylococcus in 1 out of 4 bottles.  Repeat blood cultures are negative to date.    Patient seen examined at bedside.  No new complaints.      Allergies  No Known Allergies        ANTIMICROBIALS:  piperacillin/tazobactam IVPB.. 3.375 every 8 hours      OTHER MEDS:  MEDICATIONS  (STANDING):  acetaminophen     Tablet .. 650 every 6 hours PRN  atorvastatin 20 at bedtime  carvedilol 12.5 every 12 hours  enalapril 20 daily  influenza  Vaccine (HIGH DOSE) 0.5 once  levothyroxine 112 daily  memantine 10 two times a day      Vital Signs Last 24 Hrs  T(C): 36.6 (23 Dec 2024 20:08), Max: 36.7 (23 Dec 2024 04:38)  T(F): 97.8 (23 Dec 2024 20:08), Max: 98.1 (23 Dec 2024 04:38)  HR: 61 (23 Dec 2024 20:08) (60 - 65)  BP: 149/81 (23 Dec 2024 20:08) (148/74 - 151/73)  BP(mean): --  RR: 17 (23 Dec 2024 20:08) (17 - 18)  SpO2: 99% (23 Dec 2024 20:08) (94% - 99%)    Parameters below as of 23 Dec 2024 20:08  Patient On (Oxygen Delivery Method): room air        PHYSICAL EXAM:  GA: NAD  HEENT: uncooperative  CV: nl S1/S2, no RMG  Lungs: CTAB, No distress  Abd: BS+, soft, nontender, no rebounding pain  Ext: trace edema  Neuro: No focal deficits  Skin:  wounds noted to right hip, reportedly on sacrum and left heal stage 2  IV: no phlebitis                          10.8   5.73  )-----------( 277      ( 23 Dec 2024 06:20 )             35.0       12-23    138  |  105  |  10  ----------------------------<  92  3.5   |  21[L]  |  0.70    Ca    8.0[L]      23 Dec 2024 06:20  Phos  2.3     12-23  Mg     2.1     12-23    TPro  6.5  /  Alb  2.8[L]  /  TBili  0.4  /  DBili  x   /  AST  37  /  ALT  28  /  AlkPhos  77  12-23      Urinalysis Basic - ( 23 Dec 2024 06:20 )    Color: x / Appearance: x / SG: x / pH: x  Gluc: 92 mg/dL / Ketone: x  / Bili: x / Urobili: x   Blood: x / Protein: x / Nitrite: x   Leuk Esterase: x / RBC: x / WBC x   Sq Epi: x / Non Sq Epi: x / Bacteria: x        MICROBIOLOGY:  v    Culture - Blood (collected 22 Dec 2024 07:22)  Source: .Blood BLOOD  Preliminary Report (23 Dec 2024 10:02):    No growth at 24 hours    Culture - Blood (collected 21 Dec 2024 15:44)  Source: .Blood BLOOD  Preliminary Report (22 Dec 2024 22:02):    No growth at 24 hours    Culture - Blood (collected 20 Dec 2024 14:24)  Source: .Blood BLOOD  Gram Stain (21 Dec 2024 11:22):    Growth in aerobic bottle: Gram Positive Cocci in Clusters  Final Report (22 Dec 2024 11:55):    Growth in aerobic bottle: Staphylococcus warneri Isolation of Coagulase    negative Staphylococcus from single blood culture sets may represent    contamination. Contact the Microbiology Department at [phone] if    susceptibility testing is needed.    clinically indicated.    Direct identification is available within approximately 3-5    hours either by Blood Panel Multiplexed PCR or Direct    MALDI-TOF. Details: [email or web address]  Organism: Blood Culture PCR (22 Dec 2024 11:55)  Organism: Blood Culture PCR (22 Dec 2024 11:55)      Method Type: PCR      -  Coagulase negative Staphylococcus: Detec    Culture - Blood (collected 20 Dec 2024 14:24)  Source: .Blood BLOOD  Preliminary Report (23 Dec 2024 17:01):    No growth at 72 Hours                    RADIOLOGY:  Imaging reviewed
DATE OF SERVICE: 12-24-24 @ 21:56    Patient is a 89y old  Male who presents with a chief complaint of nausea, vomiting (24 Dec 2024 16:55)      INTERVAL HISTORY: feels ok    TELEMETRY Personally reviewed: no events  	  MEDICATIONS:  carvedilol 12.5 milliGRAM(s) Oral every 12 hours  enalapril 20 milliGRAM(s) Oral daily        PHYSICAL EXAM:  T(C): 36.7 (12-24-24 @ 20:43), Max: 36.8 (12-24-24 @ 04:57)  HR: 74 (12-24-24 @ 20:43) (73 - 85)  BP: 151/72 (12-24-24 @ 20:43) (147/68 - 165/87)  RR: 18 (12-24-24 @ 20:43) (18 - 18)  SpO2: 98% (12-24-24 @ 20:43) (96% - 98%)  Wt(kg): --  I&O's Summary    23 Dec 2024 07:01  -  24 Dec 2024 07:00  --------------------------------------------------------  IN: 120 mL / OUT: 400 mL / NET: -280 mL          Appearance: In no distress	  HEENT:    PERRL, EOMI	  Cardiovascular:  S1 S2, No JVD  Respiratory: Lungs clear to auscultation	  Gastrointestinal:  Soft, Non-tender, + BS	  Vascularature:  No edema of LE  Psychiatric: Appropriate affect   Neuro: no acute focal deficits                               10.8   5.73  )-----------( 277      ( 23 Dec 2024 06:20 )             35.0     12-23    138  |  105  |  10  ----------------------------<  92  3.5   |  21[L]  |  0.70    Ca    8.0[L]      23 Dec 2024 06:20  Phos  2.3     12-23  Mg     2.1     12-23    TPro  6.5  /  Alb  2.8[L]  /  TBili  0.4  /  DBili  x   /  AST  37  /  ALT  28  /  AlkPhos  77  12-23        Labs personally reviewed      ASSESSMENT/PLAN: 	    89 year old male with advanced Alzheimer's dementia, hx of thyroid cancer s/p thyroidectomy, prostate cancer s/p prostatectomy, HFrEF, HTN, HLD  presents from home with  nausea, vomiting and poor PO intake x 1 day admitted for acute cholecystitis awaiting perc ulises drain.       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   ·  Plan:   CXR with small b/l pleural effusions  - pro bnp 30k from 2800 last month a 10 fold increase  - However not hypoxic on RA and comfortable   - Add Aldactone pending resolution of RUSS  - Jardiance avoided 2/2 urine incontinence with diaper  - GDMT with Enalapril 20mg PO daily and Coreg 12.5mg BID    Problem/Plan - 2:  ·  Problem: Hypertension.   ·  Plan: - bp stable   - c/w enalapril and coreg.    Problem/Plan - 3:  ·  Problem: Other hyperlipidemia.   ·  Plan: - c/w statin.    Problem/Plan - 4:  ·  Problem: RUSS  - Resolved          Conner Valdivia DO [facility]  Cardiovascular Medicine  [address] [place] [address], [address]  Office: [phone]  Available via Text/call on Microsoft Teams
Patient is a 89y old  Male who presents with a chief complaint of nausea, vomiting (21 Dec 2024 08:54)      SUBJECTIVE / OVERNIGHT EVENTS: Patient seen and examined at bedside. No acute events overnight. Minimally verbal, but responsive. Grimaces to pain in RUQ.     MEDICATIONS  (STANDING):  atorvastatin 20 milliGRAM(s) Oral at bedtime  carvedilol 12.5 milliGRAM(s) Oral every 12 hours  Dakins Solution - 1/4 Strength 1 Application(s) Topical two times a day  enalapril 20 milliGRAM(s) Oral daily  furosemide   Injectable 20 milliGRAM(s) IV Push daily  levothyroxine 112 MICROGram(s) Oral daily  memantine 10 milliGRAM(s) Oral two times a day  piperacillin/tazobactam IVPB.. 3.375 Gram(s) IV Intermittent every 8 hours    MEDICATIONS  (PRN):  acetaminophen     Tablet .. 650 milliGRAM(s) Oral every 6 hours PRN Temp greater or equal to 38C (100.4F), Mild Pain (1 - 3)      CAPILLARY BLOOD GLUCOSE        I&O's Summary    20 Dec 2024 07:01  -  21 Dec 2024 07:00  --------------------------------------------------------  IN: 150 mL / OUT: 150 mL / NET: 0 mL        PHYSICAL EXAM:  Vital Signs Last 24 Hrs  T(C): 36.7 (21 Dec 2024 11:20), Max: 36.7 (20 Dec 2024 20:47)  T(F): 98 (21 Dec 2024 11:20), Max: 98.1 (20 Dec 2024 20:47)  HR: 79 (21 Dec 2024 11:20) (79 - 94)  BP: 141/74 (21 Dec 2024 11:20) (103/67 - 141/74)  BP(mean): --  RR: 18 (21 Dec 2024 11:20) (17 - 18)  SpO2: 99% (21 Dec 2024 11:20) (98% - 99%)    Parameters below as of 21 Dec 2024 11:20  Patient On (Oxygen Delivery Method): room air        GEN: male in NAD, appears comfortable, no diaphoresis  EYES: No scleral injection, EOMI  ENTM: neck supple & symmetric without tracheal deviation, moist membranes, no gross hearing impairment, thyroid gland not enlarged  CV: +S1/S2, no m/r/g, no abdominal bruit, no LE edema  RESP: breathing comfortably, no respiratory accessory muscle use, CTAB, no w/r/r  GI: normoactive BS, soft, ND, no rebounding/guarding, no palpable masses; +tender to deep palpation RUQ    LABS:                        10.2   7.09  )-----------( 294      ( 21 Dec 2024 07:30 )             33.5     12-21    135  |  101  |  14  ----------------------------<  111[H]  3.7   |  21[L]  |  0.79    Ca    8.4      21 Dec 2024 07:28  Phos  3.6     12-19  Mg     2.2     12-19    TPro  6.5  /  Alb  3.9  /  TBili  0.8  /  DBili  x   /  AST  23  /  ALT  18  /  AlkPhos  79  12-20    PT/INR - ( 19 Dec 2024 20:58 )   PT: 17.7 sec;   INR: 1.55 ratio         PTT - ( 19 Dec 2024 20:58 )  PTT:32.1 sec      Urinalysis Basic - ( 21 Dec 2024 07:28 )    Color: x / Appearance: x / SG: x / pH: x  Gluc: 111 mg/dL / Ketone: x  / Bili: x / Urobili: x   Blood: x / Protein: x / Nitrite: x   Leuk Esterase: x / RBC: x / WBC x   Sq Epi: x / Non Sq Epi: x / Bacteria: x        Culture - Blood (collected 20 Dec 2024 14:24)  Source: .Blood BLOOD  Gram Stain (21 Dec 2024 11:22):    Growth in aerobic bottle: Gram Positive Cocci in Clusters  Preliminary Report (21 Dec 2024 11:22):    Growth in aerobic bottle: Gram Positive Cocci in Clusters    Direct identification is available within approximately 3-5    hours either by Blood Panel Multiplexed PCR or Direct    MALDI-TOF. Details: [email or web address]        RADIOLOGY & ADDITIONAL TESTS:  Results Reviewed:   Imaging Personally Reviewed:  Electrocardiogram Personally Reviewed:    COORDINATION OF CARE:  Care Discussed with Consultants/Other Providers [Y/N]:  Prior or Outpatient Records Reviewed [Y/N]:  
Patient is a 89y old  Male who presents with a chief complaint of nausea, vomiting (22 Dec 2024 04:09)      SUBJECTIVE / OVERNIGHT EVENTS: Patient seen and examined at bedside. No acute events overnight. At baseline. No nausea/vomiting. Still with RUQ pain. Reviewed limited paperwork from recent [place] admission.    MEDICATIONS  (STANDING):  atorvastatin 20 milliGRAM(s) Oral at bedtime  carvedilol 12.5 milliGRAM(s) Oral every 12 hours  Dakins Solution - 1/4 Strength 1 Application(s) Topical two times a day  enalapril 20 milliGRAM(s) Oral daily  levothyroxine 112 MICROGram(s) Oral daily  memantine 10 milliGRAM(s) Oral two times a day  piperacillin/tazobactam IVPB.. 3.375 Gram(s) IV Intermittent every 8 hours    MEDICATIONS  (PRN):  acetaminophen     Tablet .. 650 milliGRAM(s) Oral every 6 hours PRN Temp greater or equal to 38C (100.4F), Mild Pain (1 - 3)      CAPILLARY BLOOD GLUCOSE        I&O's Summary    21 Dec 2024 07:01  -  22 Dec 2024 07:00  --------------------------------------------------------  IN: 100 mL / OUT: 300 mL / NET: -200 mL        PHYSICAL EXAM:  Vital Signs Last 24 Hrs  T(C): 36.4 (22 Dec 2024 05:01), Max: 36.7 (21 Dec 2024 11:20)  T(F): 97.5 (22 Dec 2024 05:01), Max: 98 (21 Dec 2024 11:20)  HR: 68 (22 Dec 2024 05:01) (68 - 79)  BP: 151/73 (22 Dec 2024 05:01) (139/69 - 151/73)  BP(mean): --  RR: 18 (22 Dec 2024 05:01) (18 - 18)  SpO2: 97% (22 Dec 2024 05:01) (97% - 100%)    Parameters below as of 22 Dec 2024 05:01  Patient On (Oxygen Delivery Method): room air        GEN: male in NAD, appears comfortable, no diaphoresis  EYES: No scleral injection, EOMI  ENTM: neck supple & symmetric without tracheal deviation, moist membranes, no gross hearing impairment, thyroid gland not enlarged  CV: +S1/S2, no m/r/g, no abdominal bruit, no LE edema  RESP: breathing comfortably, no respiratory accessory muscle use, CTAB, no w/r/r  GI: normoactive BS, soft, ND, no rebounding/guarding, no palpable masses; +RUQ pain to palpation    LABS:                        10.5   5.53  )-----------( 300      ( 22 Dec 2024 07:13 )             33.8     12-22    137  |  103  |  10  ----------------------------<  94  3.5   |  22  |  0.74    Ca    8.3[L]      22 Dec 2024 07:15  Phos  2.9     12-22  Mg     2.1     12-22    TPro  6.6  /  Alb  2.8[L]  /  TBili  0.6  /  DBili  x   /  AST  43[H]  /  ALT  29  /  AlkPhos  86  12-22    PT/INR - ( 22 Dec 2024 07:14 )   PT: 14.7 sec;   INR: 1.29 ratio         PTT - ( 22 Dec 2024 07:14 )  PTT:28.8 sec      Urinalysis Basic - ( 22 Dec 2024 07:15 )    Color: x / Appearance: x / SG: x / pH: x  Gluc: 94 mg/dL / Ketone: x  / Bili: x / Urobili: x   Blood: x / Protein: x / Nitrite: x   Leuk Esterase: x / RBC: x / WBC x   Sq Epi: x / Non Sq Epi: x / Bacteria: x        Culture - Blood (collected 20 Dec 2024 14:24)  Source: .Blood BLOOD  Gram Stain (21 Dec 2024 11:22):    Growth in aerobic bottle: Gram Positive Cocci in Clusters  Preliminary Report (21 Dec 2024 11:22):    Growth in aerobic bottle: Gram Positive Cocci in Clusters    Direct identification is available within approximately 3-5    hours either by Blood Panel Multiplexed PCR or Direct    MALDI-TOF. Details: [email or web address]  Organism: Blood Culture PCR (21 Dec 2024 13:35)  Organism: Blood Culture PCR (21 Dec 2024 13:35)    Culture - Blood (collected 20 Dec 2024 14:24)  Source: .Blood BLOOD  Preliminary Report (21 Dec 2024 17:01):    No growth at 24 hours        RADIOLOGY & ADDITIONAL TESTS:  Results Reviewed:   Imaging Personally Reviewed:  Electrocardiogram Personally Reviewed:    COORDINATION OF CARE:  Care Discussed with Consultants/Other Providers [Y/N]:  Prior or Outpatient Records Reviewed [Y/N]:  
unable to obtain ROS    MEDICATIONS  (STANDING):  ascorbic acid 500 milliGRAM(s) Oral daily  atorvastatin 20 milliGRAM(s) Oral at bedtime  carvedilol 12.5 milliGRAM(s) Oral every 12 hours  Dakins Solution - 1/4 Strength 1 Application(s) Topical two times a day  enalapril 20 milliGRAM(s) Oral daily  influenza  Vaccine (HIGH DOSE) 0.5 milliLiter(s) IntraMuscular once  levothyroxine 112 MICROGram(s) Oral daily  memantine 10 milliGRAM(s) Oral two times a day  multivitamin 1 Tablet(s) Oral daily  piperacillin/tazobactam IVPB.. 3.375 Gram(s) IV Intermittent every 8 hours    MEDICATIONS  (PRN):  acetaminophen     Tablet .. 650 milliGRAM(s) Oral every 6 hours PRN Temp greater or equal to 38C (100.4F), Mild Pain (1 - 3)    Vital Signs Last 24 Hrs  T(C): 36.9 (25 Dec 2024 05:05), Max: 36.9 (25 Dec 2024 05:05)  T(F): 98.5 (25 Dec 2024 05:05), Max: 98.5 (25 Dec 2024 05:05)  HR: 89 (25 Dec 2024 05:05) (74 - 89)  BP: 165/90 (25 Dec 2024 05:05) (151/72 - 165/90)  BP(mean): --  RR: 18 (25 Dec 2024 05:05) (18 - 18)  SpO2: 100% (25 Dec 2024 05:05) (98% - 100%)    Parameters below as of 25 Dec 2024 05:05  Patient On (Oxygen Delivery Method): room air    GENERAL: NAD  HEAD:  Atraumatic, Normocephalic  EYES: EOMI, PERRLA, conjunctiva and sclera clear  ENT: Pharynx not erythematous  PULMONARY: Clear to auscultation bilaterally; No wheeze  CARDIOVASCULAR: Regular rate and rhythm; No murmurs, rubs, or gallops  ABDOMEN: Soft, Nontender, Nondistended; Bowel sounds present  EXTREMITIES:  2+ Peripheral Pulses, No clubbing, cyanosis, or edema  MUSCULOSKELETAL: No calf tenderness  PSYCH: AAOx3, normal affect  SKIN: warm and dry, No rashes or lesions    .  LABS:                     RADIOLOGY, EKG & ADDITIONAL TESTS: Reviewed. 
unable to obtain ROS    MEDICATIONS  (STANDING):  atorvastatin 20 milliGRAM(s) Oral at bedtime  carvedilol 12.5 milliGRAM(s) Oral every 12 hours  Dakins Solution - 1/4 Strength 1 Application(s) Topical two times a day  enalapril 20 milliGRAM(s) Oral daily  influenza  Vaccine (HIGH DOSE) 0.5 milliLiter(s) IntraMuscular once  levothyroxine 112 MICROGram(s) Oral daily  memantine 10 milliGRAM(s) Oral two times a day  piperacillin/tazobactam IVPB.. 3.375 Gram(s) IV Intermittent every 8 hours    MEDICATIONS  (PRN):  acetaminophen     Tablet .. 650 milliGRAM(s) Oral every 6 hours PRN Temp greater or equal to 38C (100.4F), Mild Pain (1 - 3)    Vital Signs Last 24 Hrs  T(C): 36.7 (24 Dec 2024 08:05), Max: 36.8 (24 Dec 2024 04:57)  T(F): 98.1 (24 Dec 2024 08:05), Max: 98.2 (24 Dec 2024 04:57)  HR: 73 (24 Dec 2024 08:05) (61 - 85)  BP: 147/68 (24 Dec 2024 08:05) (147/68 - 165/87)  BP(mean): --  RR: 18 (24 Dec 2024 08:05) (17 - 18)  SpO2: 97% (24 Dec 2024 08:05) (96% - 99%)    Parameters below as of 24 Dec 2024 08:05  Patient On (Oxygen Delivery Method): room air    GENERAL: NAD  HEAD:  Atraumatic, Normocephalic  EYES: EOMI, PERRLA, conjunctiva and sclera clear  ENT: Pharynx not erythematous  PULMONARY: Clear to auscultation bilaterally; No wheeze  CARDIOVASCULAR: Regular rate and rhythm; No murmurs, rubs, or gallops  ABDOMEN: Soft, Nontender, Nondistended; Bowel sounds present  EXTREMITIES:  2+ Peripheral Pulses, No clubbing, cyanosis, or edema  MUSCULOSKELETAL: No calf tenderness  PSYCH: AAOx3, normal affect  SKIN: warm and dry, No rashes or lesions    .  LABS:                         10.8   5.73  )-----------( 277      ( 23 Dec 2024 06:20 )             35.0     12-23    138  |  105  |  10  ----------------------------<  92  3.5   |  21[L]  |  0.70    Ca    8.0[L]      23 Dec 2024 06:20  Phos  2.3     12-23  Mg     2.1     12-23    TPro  6.5  /  Alb  2.8[L]  /  TBili  0.4  /  DBili  x   /  AST  37  /  ALT  28  /  AlkPhos  77  12-23    PT/INR - ( 23 Dec 2024 06:20 )   PT: 13.6 sec;   INR: 1.20 ratio         PTT - ( 23 Dec 2024 06:20 )  PTT:27.6 sec  Urinalysis Basic - ( 23 Dec 2024 06:20 )    Color: x / Appearance: x / SG: x / pH: x  Gluc: 92 mg/dL / Ketone: x  / Bili: x / Urobili: x   Blood: x / Protein: x / Nitrite: x   Leuk Esterase: x / RBC: x / WBC x   Sq Epi: x / Non Sq Epi: x / Bacteria: x            RADIOLOGY, EKG & ADDITIONAL TESTS: Reviewed.

## 2024-12-25 NOTE — SWALLOW BEDSIDE ASSESSMENT ADULT - SWALLOW EVAL: DIAGNOSIS
Patient is a 89 year old male with advanced Alzheimer's dementia, presents from home with nausea, vomiting and poor PO intake x 1 day, admitted for acute cholecystitis awaiting perc ulises drain. Pt presents with clinical signs of an oral stage dysphagia. Reduced orientation to presentation of liquids via cup with pt attempting to bite cup. Significant improvement once given straw. Mildly prolonged mastication of solids, improved oral management of soft vs. hard solids with good oral clearance of bolus. No overt signs or symptoms of penetration or aspiration on soft solids, purees, or thin liquids via straw.

## 2024-12-25 NOTE — SWALLOW BEDSIDE ASSESSMENT ADULT - SLP GENERAL OBSERVATIONS
Pt encountered awake in bed, AA&OxO, mostly non-verbal, however, noted to respond with 1 word utterances when asked if he enjoys the food. +Clear/dry vocal quality throughout evaluation. Pt unable to follow commands or answer simple yes/no questions. Per RN, pt has made significant improvement in mental status since hospital admission when altered diet originally initiated.

## 2024-12-25 NOTE — CHART NOTE - NSCHARTNOTEFT_GEN_A_CORE
An attempt was made to obtain family consent for perc ulises however pt wanted to discuss case with anesthesia.  Pt's daughter was contacted again for consent but she verbalized that she would like to discuss with primary team regarding a transfer to [place].   Perc ulises will be deferred given lack of consent.    If HCP would like to move forward with procedure please reconsult or contact IR at .    D/w primary.    Gnearo Mittal PA-C  IR call back ext. [phone]  Also available on Teams    - Non-emergent consults: Place IR consult order in [place]  - Emergent issues (pager): [facility] [phone]; [facility] [phone]; [phone]  - Scheduling questions: [facility] [phone]; [facility] [phone]  - Clinic/outpatient booking: [facility] [phone]; [facility] [phone].
Discussed with daughter Larisa. Patient is no longer on aspirin which was stopped after [facility] hospitalization; thus, he's been off of it for >one week. Last dose of Eliquis approximately 12/19, though possibly even before this as patient was nauseous and vomiting his pills.
Pt to be transferred to [facility].  Spoke with Earlene from the Transfer Center at [phone] who then transferred me to the receiving Attending, Dr. Valencia.   Daughter, Larisa, is aware the transfer will occur today but not certain regarding the time.      Medical Attending from [place] is aware.     Pt pending transfer today.        Larisa Yoo [facility]  Department of Medicine.
I spoke to daughter Larisa [phone]    She states she still has questions/concerns about the cholecystostomy tube, though I explained that we wouldn't do it unless she gives consent. I stated IR should call her for consent and explain risks vs. benefits. She also wants to know if he will get general anesthesia or light sedation -- of which anesthesia would explain to her. She also wants surgery to come back and speak to her if cholecystectomy is still an option.     - Tomorrow will ask surgery to come back and look at current data and determine if surgery is still not an option  - Daughter nadege will plan for cholecystostomy tomorrow by holding Eliquis and keeping NPO for now
JUSTIN GARG (MRN-[id number]) is a 89M w/ prostate & thyroid cancer and recent PE on Eliquis (last dose 12/19) who presented w/ RUQ pain, N/V, and found to have +HIDA scan for which IR consulted for perc ulises. Patient is currently Afebrile, HDS, without elevated WBC on zosyn since 12/20, however with continued RUQ pain.      -- IR will plan to perform perc ulises on Monday 12/23  -- NPO at midnight on 12/22  -- Continue to hold anticoagulation  -- please place IR procedure request order under Dr. France  -- STAT labs in the am:  cbc, bmp, mg, phos, coags, type+screen x 2   -- Informed consent pending      --  Aleena Dye, PGY-4  Vascular and Interventional Radiology   Available on Microsoft Teams    - Non-emergent consults: Place IR consult order in [place]  - Emergent issues (pager): [facility] [phone]; [facility] [phone]; [phone]  - Scheduling questions: [facility] [phone]; [facility] [phone]  - Clinic/outpatient booking: [facility] [phone]; [facility] [phone]
Notified by Nuclear Medicine; patient with positive HIDA scan; called surgery for further recommendations per Dr Eric Newberry consult with  with IR regarding plan for percutaneous ulises. Note writer; entered IR consult referral. Pt currently on Zosyn and is stable at this time. Will monitor

## 2024-12-25 NOTE — PROGRESS NOTE ADULT - PROBLEM SELECTOR PLAN 6
- Patient diagnosed with PE at [facility] recently in past several weeks prior and was on a heparin drip and Eliquis on discharge. Hospital course complicated thigh hematoma  - Hold AC for now pending procedure, daughter not too keen on heparin infusion in the interim  - He was discharged on Eliquis 2.5 mg BID at a reduced dose likely due to bleed. Will resume this after drain placement.  - Our CTA chest w/ no PE. unable to perform LE Duplex as patient contracted

## 2024-12-25 NOTE — CHART NOTE - NSCHARTNOTESELECT_GEN_ALL_CORE
Event Note
IR/Event Note
Positive HIDA Scan/Event Note
Event Note
Event Note/Event Note
IR Chart Note

## 2024-12-25 NOTE — PROGRESS NOTE ADULT - PROBLEM SELECTOR PLAN 4
- bp stable   - c/w enalapril and coreg

## 2024-12-25 NOTE — PROGRESS NOTE ADULT - PROBLEM SELECTOR PLAN 1
- CT abdomen- Abdomen/pelvis: No bowel obstruction. Gallbladder findings suspicious for acute cholecystitis.  - per surgery, not a surgical candidate at this time d/t poor optimization for surgery and not within goals of care per daughter  - per IR, plan for perc ulises on 12/23 (off ASA and last eliquis ~12/19)  - HIDA scan confirms  - c/w zosyn, started 12/20  - blood cultures x 2 12/20, possible contaminant; repeat ngtd   - ID following
- CT abdomen- Abdomen/pelvis: No bowel obstruction. Gallbladder findings suspicious for acute cholecystitis.  - per surgery, not a surgical candidate at this time d/t poor optimization for surgery and not within goals of care per daughter  - per IR, plan for perc ulises on hold as patient pending transfer to [place] per patients daughter derrick request-- accepted by hospitalist at [place] dr osmany leyva on 12/24, awaiting transfer  - HIDA scan confirmed   - c/w zosyn, started 12/20  - blood cultures x 2 12/20, possible contaminant; repeat ngtd   - ID following
- CT abdomen- Abdomen/pelvis: No bowel obstruction. Gallbladder findings suspicious for acute cholecystitis.  - per surgery, not a surgical candidate at this time d/t poor optimization for surgery and not within goals of care per daughter  - per IR, plan for perc ulises on hold as patient still confirming if she wants to transfer the patient to [place]  - I have discussed with [place] transfer [place]/ dr osmany leyva- patient can be accepted however would not be able to place IR drain until next week. Patients daughter not sure if she wants to wait that long for the drain replacement.   - HIDA scan confirmed   - c/w zosyn, started 12/20  - blood cultures x 2 12/20, possible contaminant; repeat ngtd   - ID following
- CT abdomen- Abdomen/pelvis: No bowel obstruction. Gallbladder findings suspicious for acute cholecystitis.  - per surgery, not a surgical candidate at this time d/t poor optimization for surgery and not within goals of care per daughter  - per IR, plan for perc ulises on 12/23 (off ASA and last eliquis ~12/19)  - HIDA scan confirms  - start zosyn, started 12/20  - blood cultures x 2 ordered STAT. One may be contaminated with cocci in clusters so will repeat  - ID following
- CT abdomen- Abdomen/pelvis: No bowel obstruction. Gallbladder findings suspicious for acute cholecystitis.  - per surgery, not a surgical candidate at this time d/t poor optimization for surgery and not within goals of care per daughter  - per IR, plan for perc ulises drain once ASA held for 5 days, Eliquis for 24 hours (last dose of Eliquis unclear; per chart review it appears it was only started 5-6 days ago for PE, however PE not seen on CTA chest; but likely 12/19 day was last dose); holding aspirin as of 12/20  - HIDA scan confirms  - start zosyn, started 12/20  - blood cultures x 2 ordered STAT. One may be contaminated with cocci in clusters so will repeat  - ID consulted

## 2024-12-25 NOTE — PROGRESS NOTE ADULT - PROVIDER SPECIALTY LIST ADULT
Cardiology
Cardiology
Infectious Disease
Internal Medicine
Internal Medicine
Infectious Disease
Cardiology
Infectious Disease
Hospitalist

## 2024-12-25 NOTE — SWALLOW BEDSIDE ASSESSMENT ADULT - COMMENTS
palliative care consulted for GOC as patient is full code, however appears to be hospice appropriate due to advanced dementia and numerous hospitalizations  12/23: Pt's daughter was contacted again for consent but she verbalized that she would like to discuss with primary team regarding a transfer to [place]. Brittany montgomery will be deferred given lack of consent. palliative care consulted for GOC as patient is full code, however appears to be hospice appropriate due to advanced dementia and numerous hospitalizations  12/23: Pt's daughter was contacted again for consent but she verbalized that she would like to discuss with primary team regarding a transfer to [place]. Brittany montgomery will be deferred given lack of consent.    Pt is unknown to this service line

## 2024-12-25 NOTE — PROGRESS NOTE ADULT - REASON FOR ADMISSION
nausea, vomiting

## 2024-12-25 NOTE — PROGRESS NOTE ADULT - ASSESSMENT
89-year-old male with Alzheimer's dementia, thyroid cancer status post thyroidectomy, prostate cancer status post prostatectomy, heart failure with reduced ejection fraction, hypertension who was admitted to the hospital due to nausea and vomiting and poor p.o. intake.    Patient with recent discharge from [facility] on 12/13/2024.  Patient found to have DVT/PE at the time and started on apixaban.  Patient at baseline is A and O x 0, unable to obtain further information from patient.    Patient been previously admitted to [facility] in August 2024 where is found to have an abscess involving the right hip.  Patient had completed treatment as planned with outpatient antibiotics.  Patient now admitted with imaging showing distended gallbladder with stones as well as wall thickening and trace pericholecystic fluid suspicious for acute cholecystitis.    Surgery evaluated on admission and determined the patient is not a surgical candidate and IR also without an immediate intervention due to apixaban use.    Since admission, patient has remained afebrile otherwise hemodynamically stable.  Labs show no leukocytosis, anemia 10.5/35.2, thrombocytopenia 145.  BMP with elevated creatinine of 2.8.  Hepatic function within normal limits.  HIDA scan obtained 12/20/2024 shows evidence for acute cholecystitis.    #Abnormal imaging of the abdomen  #Acute cholecystitis  #RUSS  #Positive blood culture, CoNS    Recommendations  Continue renally dosed piperacillin/tazobactam for now  Follow IR/surgery input - plan for ulises tube placement   Wound care eval  Blood culture from 12/21 with CoNS, repeats negative to date- likely contamination, would not treat at this time  Follow fever curve WBC    Jose Alejandro MD [facility]  Division of Infectious Diseases
89 year old male with advanced Alzheimer's dementia, hx of thyroid cancer s/p thyroidectomy, prostate cancer s/p prostatectomy, HFrEF, HTN, HLD  presents from home with  nausea, vomiting and poor PO intake x 1 day admitted for acute cholecystitis awaiting perc ulises drain.       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   ·  Plan:   CXR with small b/l pleural effusions  - pro bnp 30k from 2800 last month a 10 fold increase  - However not hypoxic on RA and comfortable   - Add Aldactone pending resolution of RUSS  - Jardiance avoided 2/2 urine incontinence with diaper  - GDMT with Enalapril 20mg PO daily and Coreg 12.5mg BID  - IV lasix held due to rising Cr     Problem/Plan - 2:  ·  Problem: Hypertension.   ·  Plan: - bp stable   - c/w enalapril and coreg.  - May need to hold Enalapril if RUSS worsens    Problem/Plan - 3:  ·  Problem: Other hyperlipidemia.   ·  Plan: - c/w statin.    Problem/Plan - 4:  ·  Problem: RUSS  - Likely JANET from multiple contrast studies  - Trend Cr    Problem/Plan- 5:   acute ulises- pending IR procedure once off asa/eliquis      Dr. Valdivia to follow     Brian Urena MD [facility]  Cardiology Attending, [facility]-NS/GAVIN  Avaliable on [employer] Team    
89-year-old male with Alzheimer's dementia, thyroid cancer status post thyroidectomy, prostate cancer status post prostatectomy, heart failure with reduced ejection fraction, hypertension who was admitted to the hospital due to nausea and vomiting and poor p.o. intake.    Patient with recent discharge from [facility] on 12/13/2024.  Patient found to have DVT/PE at the time and started on apixaban.  Patient at baseline is A and O x 0, unable to obtain further information from patient.    Patient been previously admitted to [facility] in August 2024 where is found to have an abscess involving the right hip.  Patient had completed treatment as planned with outpatient antibiotics.  Patient now admitted with imaging showing distended gallbladder with stones as well as wall thickening and trace pericholecystic fluid suspicious for acute cholecystitis.    Surgery evaluated on admission and determined the patient is not a surgical candidate and IR also without an immediate intervention due to apixaban use.    Since admission, patient has remained afebrile otherwise hemodynamically stable.  Labs show no leukocytosis, anemia 10.5/35.2, thrombocytopenia 145.  BMP with elevated creatinine of 2.8.  Hepatic function within normal limits.  HIDA scan obtained 12/20/2024 shows evidence for acute cholecystitis.    #Abnormal imaging of the abdomen  #Acute cholecystitis  #RUSS  #Positive blood culture, CoNS    Recommendations  Continue renally dosed piperacillin/tazobactam for now  Follow IR/surgery input - plan for ulises tube placement   Wound care eval  Blood culture from 12/21 with CoNS, repeats negative to date- likely contamination, would not treat at this time  Follow fever curve WBC    Jose Alejandro MD [facility]  Division of Infectious Diseases
89 year old male with advanced Alzheimer's dementia, hx of thyroid cancer s/p thyroidectomy, prostate cancer s/p prostatectomy, HFrEF, HTN, HLD  presents from home with  nausea, vomiting and poor PO intake x 1 day admitted for acute cholecystitis awaiting perc ulises drain.       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   ·  Plan:   CXR with small b/l pleural effusions  - pro bnp 30k from 2800 last month a 10 fold increase  - However not hypoxic on RA and comfortable   - Add Aldactone pending resolution of RUSS  - Jardiance avoided 2/2 urine incontinence with diaper  - GDMT with Enalapril 20mg PO daily and Coreg 12.5mg BID  - IV lasix held due to rising Cr     Problem/Plan - 2:  ·  Problem: Hypertension.   ·  Plan: - bp stable   - c/w enalapril and coreg.  - May need to hold Enalapril if RUSS worsens    Problem/Plan - 3:  ·  Problem: Other hyperlipidemia.   ·  Plan: - c/w statin.    Problem/Plan - 4:  ·  Problem: RUSS  - Likely JANET from multiple contrast studies  - Trend Cr    Problem/Plan- 5:   acute ulises- pending IR procedure, off asa/eliquis      Dr. Valdivia to follow     Brian Urena MD [facility]  Cardiology Attending, [facility]-NS/GAVIN  Avaliable on [employer] Team
89-year-old male with Alzheimer's dementia, thyroid cancer status post thyroidectomy, prostate cancer status post prostatectomy, heart failure with reduced ejection fraction, hypertension who was admitted to the hospital due to nausea and vomiting and poor p.o. intake.    Patient with recent discharge from [facility] on 12/13/2024.  Patient found to have DVT/PE at the time and started on apixaban.  Patient at baseline is A and O x 0, unable to obtain further information from patient.    Patient been previously admitted to [facility] in August 2024 where is found to have an abscess involving the right hip.  Patient had completed treatment as planned with outpatient antibiotics.  Patient now admitted with imaging showing distended gallbladder with stones as well as wall thickening and trace pericholecystic fluid suspicious for acute cholecystitis.    Surgery evaluated on admission and determined the patient is not a surgical candidate and IR also without an immediate intervention due to apixaban use.    Since admission, patient has remained afebrile otherwise hemodynamically stable.  Labs show no leukocytosis, anemia 10.5/35.2, thrombocytopenia 145.  BMP with elevated creatinine of 2.8.  Hepatic function within normal limits.  HIDA scan obtained 12/20/2024 shows evidence for acute cholecystitis.    #Abnormal imaging of the abdomen  #Acute cholecystitis  #RUSS  #Positive blood culture, CoNS    Recommendations  Continue renally dosed piperacillin/tazobactam for now  Follow IR/surgery input - plan for ulises tube placement   Wound care eval  Blood culture from 12/21 with CoNS, repeats negative to date- likely contamination, would not treat at this time  Follow fever curve WBC    Jose Alejandro MD [facility]  Division of Infectious Diseases
89 year old male with advanced Alzheimer's dementia, hx of thyroid cancer s/p thyroidectomy, prostate cancer s/p prostatectomy, HFrEF, HTN, HLD  presents from home with nausea, vomiting and poor PO intake x 1 day admitted for acute cholecystitis awaiting perc ulises drain. 

## 2024-12-25 NOTE — PROGRESS NOTE ADULT - TIME BILLING
coordinating care, discussing with consultants
plan of care, chart review

## 2024-12-25 NOTE — PROGRESS NOTE ADULT - NSPROGADDITIONALINFOA_GEN_ALL_CORE
disposition: plan for perc ulises drain on hold as patients daughter still deciding if she wants patient transferred or stay in [facility]  discussed with daughter on 12/23 and 12/24  discussed with acp    Archana Hutson D.O.  Division of Hospital Medicine  Available on [employer] Teams
disposition: awaiting transfer to [facility]   discussed with daughter on 12/23 and 12/24  discussed with acp    Archana Hutson D.O.  Division of Hospital Medicine  Available on MS Teams
disposition: plan for perc ulises drain on 12/23    Archana Hutson D.O.  Division of Hospital Medicine  Available on MS Teams

## 2024-12-25 NOTE — PROGRESS NOTE ADULT - PROBLEM SELECTOR PLAN 3
- appears euvolemic, on room air  - CXR with small b/l pleural effusions  - pro bnp >20,000  - Cards following and they want to gently diurese him, hold off until tolerating more PO
- appears euvolemic, on room air  - CXR with small b/l pleural effusions  - pro bnp >20,000  - Cards following and they want to gently diurese him
- appears euvolemic, on room air  - CXR with small b/l pleural effusions  - pro bnp >20,000  - Cards following and they want to gently diurese him, hold off until tolerating more PO

## 2024-12-25 NOTE — PROGRESS NOTE ADULT - PROBLEM SELECTOR PLAN 2
- baseline appears to be aaox0-1  - c/w memantine daily   - palliative care consulted-- daughter confirmed full code   - Pureed with thickened diet  - Wound care following for decubitus ulcer present on admission. Also has severe protein-calorie malnutrition  - pall care consulted- daughter confirms full code

## 2024-12-25 NOTE — SWALLOW BEDSIDE ASSESSMENT ADULT - SLP PERTINENT HISTORY OF CURRENT PROBLEM
Patient is a 89 year old male with advanced Alzheimer's dementia, hx of thyroid cancer s/p thyroidectomy, prostate cancer s/p prostatectomy, HFrEF, HTN, HLD  presents from home with  nausea, vomiting and poor PO intake x 1 day. 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 Currently patient is AAOX0, admitted for acute cholecystitis awaiting perc ulises drain.

## 2024-12-25 NOTE — SWALLOW BEDSIDE ASSESSMENT ADULT - ADDITIONAL RECOMMENDATIONS
Monitor for s/s aspiration/laryngeal penetration. If noted:  D/C p.o. intake, provide non-oral nutrition/hydration/meds, and contact this service @ [phone]  Maintain good oral hygiene  LTG: Pt will tolerate the least restrictive diet without overt signs or symptoms of penetration or aspiration

## 2024-12-25 NOTE — SWALLOW BEDSIDE ASSESSMENT ADULT - ORAL PREPARATORY PHASE
Within functional limits Reduced oral grading attempts to bite cup; improvement in orientation via straw

## 2024-12-26 LAB
CULTURE RESULTS: SIGNIFICANT CHANGE UP
SPECIMEN SOURCE: SIGNIFICANT CHANGE UP

## 2024-12-27 LAB
CULTURE RESULTS: SIGNIFICANT CHANGE UP
SPECIMEN SOURCE: SIGNIFICANT CHANGE UP

## 2025-03-19 NOTE — ED PROVIDER NOTE - NS_BEDUNITTYPES_ED_ALL_ED
No significant hip degeneration, I recommend at this time that we cancel hip injection, due to the lack of arthritis.   
S/W pt and advised the same  Pt will think about PT  CB with questions or concerns  
TELEMETRY

## 2025-03-27 NOTE — ED PROVIDER NOTE - NS ED MD TWO NIGHTS YN
Pre-Operative:  1.  Patient/Caregiver identifies - states name and date of birth.  2.  The patient is free from signs and symptoms of injury.  3.  The patient receives appropriate medication(s), safely administered during the Perioperative period.  4.  The patient is free from signs and symptoms of infection.  5.  The patient has wound / tissue perfusion.  6.  The patients's fluid, electrolyte, and acid-base balances are established preoperatively.  7.  The patient's pulmonary function is established preoperatively.  8.  The patient's cardiovascular status is established preoperatively.  9.  The patient / caregiver demonstrates knowledge of nutritional management related to the operative or other invasive procedure.  10.  The patient/caregiver demonstrates knowledge of medication management.  11.  The patient/caregiver demonstrates knowledge of pain management.  12.  The patient participates in the rehabilitation process as applicable.  13.  The patient/caregiver participates in decisions affection his or her Perioperative plan of care.  14.  The patient's care is consistent with the individualized Perioperative plan of care.  15.  The patient's right to privacy is maintained.  16.  The patient is the recipient of competent and ethical care within legal standards of practice.  17.  The patient's value system, lifestyle, ethnicity, and culture are considered, respected, and incorporated in the Perioperative plan of care and understands special services available.  18.  The patient demonstrates and/or reports adequate pain control throughout the the Perioperative period.  19.  The patient's neurological status is established preoperatively.  20.  The patient/caregiver demonstrates knowledge of the expected responses to the operative or invasive procedure.  21.  Patient/Caregiver has reduced anxiety.  Interventions- Familiarize with environment and equipment.  22. Patient/Caregiver verbalizes understanding of Phase I  Yes

## 2025-05-05 NOTE — H&P ADULT. - MENTAL STATUS
Next visit: Visit date not found    Medication last prescribed 4/3/25 trazodone (DESYREL) 150 MG tablet #30 R-0, TAKE 1 TABLET BY MOUTH EVERY NIGHT      Verified prescription in clinician's note.    Medication: trazodone (DESYREL)  passed protocol.   Last office visit date: 2/5/25  Next appointment scheduled?: No;  red follow order placed    Number of refills given: #30 R-0  
A and O x 1-2